# Patient Record
Sex: MALE | Race: WHITE | NOT HISPANIC OR LATINO | Employment: FULL TIME | URBAN - METROPOLITAN AREA
[De-identification: names, ages, dates, MRNs, and addresses within clinical notes are randomized per-mention and may not be internally consistent; named-entity substitution may affect disease eponyms.]

---

## 2018-01-09 ENCOUNTER — OFFICE VISIT CONVERTED (OUTPATIENT)
Dept: PULMONOLOGY | Facility: CLINIC | Age: 53
End: 2018-01-09
Attending: INTERNAL MEDICINE

## 2018-02-09 ENCOUNTER — OFFICE VISIT CONVERTED (OUTPATIENT)
Dept: PULMONOLOGY | Facility: CLINIC | Age: 53
End: 2018-02-09
Attending: INTERNAL MEDICINE

## 2018-02-28 ENCOUNTER — OFFICE VISIT CONVERTED (OUTPATIENT)
Dept: PULMONOLOGY | Facility: CLINIC | Age: 53
End: 2018-02-28
Attending: INTERNAL MEDICINE

## 2018-09-20 ENCOUNTER — OFFICE VISIT CONVERTED (OUTPATIENT)
Dept: PULMONOLOGY | Facility: CLINIC | Age: 53
End: 2018-09-20
Attending: INTERNAL MEDICINE

## 2018-10-19 ENCOUNTER — OFFICE VISIT CONVERTED (OUTPATIENT)
Dept: PULMONOLOGY | Facility: CLINIC | Age: 53
End: 2018-10-19
Attending: INTERNAL MEDICINE

## 2018-12-07 ENCOUNTER — OFFICE VISIT CONVERTED (OUTPATIENT)
Dept: PULMONOLOGY | Facility: CLINIC | Age: 53
End: 2018-12-07
Attending: INTERNAL MEDICINE

## 2019-06-10 ENCOUNTER — HOSPITAL ENCOUNTER (OUTPATIENT)
Dept: GENERAL RADIOLOGY | Facility: HOSPITAL | Age: 54
Discharge: HOME | End: 2019-06-10
Attending: INTERNAL MEDICINE

## 2019-06-26 ENCOUNTER — OFFICE VISIT CONVERTED (OUTPATIENT)
Dept: PULMONOLOGY | Facility: CLINIC | Age: 54
End: 2019-06-26
Attending: INTERNAL MEDICINE

## 2019-09-23 ENCOUNTER — OUTSIDE FACILITY SERVICE (OUTPATIENT)
Dept: SLEEP MEDICINE | Facility: HOSPITAL | Age: 54
End: 2019-09-23

## 2019-09-23 ENCOUNTER — HOSPITAL ENCOUNTER (OUTPATIENT)
Dept: SLEEP MEDICINE | Facility: HOSPITAL | Age: 54
Discharge: HOME | End: 2019-09-23
Attending: INTERNAL MEDICINE

## 2019-09-25 ENCOUNTER — OUTSIDE FACILITY SERVICE (OUTPATIENT)
Dept: SLEEP MEDICINE | Facility: HOSPITAL | Age: 54
End: 2019-09-25

## 2019-09-25 PROCEDURE — 95811 POLYSOM 6/>YRS CPAP 4/> PARM: CPT | Performed by: INTERNAL MEDICINE

## 2019-09-30 ENCOUNTER — HOSPITAL ENCOUNTER (OUTPATIENT)
Dept: CARDIOLOGY | Facility: HOSPITAL | Age: 54
Discharge: HOME | End: 2019-09-30
Attending: INTERNAL MEDICINE

## 2019-09-30 LAB
CREAT BLD-MCNC: 0.8 MG/DL (ref 0.6–1.4)
GFR SERPLBLD BASED ON 1.73 SQ M-ARVRAT: >60 ML/MIN/{1.73_M2}

## 2019-10-17 ENCOUNTER — HOSPITAL ENCOUNTER (OUTPATIENT)
Dept: LAB | Facility: HOSPITAL | Age: 54
Discharge: HOME | End: 2019-10-17
Attending: INTERNAL MEDICINE

## 2019-10-17 ENCOUNTER — OFFICE VISIT CONVERTED (OUTPATIENT)
Dept: PULMONOLOGY | Facility: CLINIC | Age: 54
End: 2019-10-17
Attending: INTERNAL MEDICINE

## 2019-10-17 LAB
ALBUMIN SERPL-MCNC: 4.1 G/DL (ref 3.5–5)
ALBUMIN/GLOB SERPL: 1.1 {RATIO} (ref 1.4–2.6)
ALP SERPL-CCNC: 157 U/L (ref 56–119)
ALT SERPL-CCNC: 24 U/L (ref 10–40)
ANION GAP SERPL CALC-SCNC: 18 MMOL/L (ref 8–19)
AST SERPL-CCNC: 26 U/L (ref 15–50)
BILIRUB SERPL-MCNC: 0.57 MG/DL (ref 0.2–1.3)
BNP SERPL-MCNC: 1116 PG/ML (ref 0–900)
BUN SERPL-MCNC: 16 MG/DL (ref 5–25)
BUN/CREAT SERPL: 19 {RATIO} (ref 6–20)
CALCIUM SERPL-MCNC: 9.8 MG/DL (ref 8.7–10.4)
CHLORIDE SERPL-SCNC: 102 MMOL/L (ref 99–111)
CONV CO2: 24 MMOL/L (ref 22–32)
CONV TOTAL PROTEIN: 7.9 G/DL (ref 6.3–8.2)
CREAT UR-MCNC: 0.85 MG/DL (ref 0.7–1.2)
GFR SERPLBLD BASED ON 1.73 SQ M-ARVRAT: >60 ML/MIN/{1.73_M2}
GLOBULIN UR ELPH-MCNC: 3.8 G/DL (ref 2–3.5)
GLUCOSE SERPL-MCNC: 95 MG/DL (ref 70–99)
OSMOLALITY SERPL CALC.SUM OF ELEC: 289 MOSM/KG (ref 273–304)
POTASSIUM SERPL-SCNC: 4.6 MMOL/L (ref 3.5–5.3)
SODIUM SERPL-SCNC: 139 MMOL/L (ref 135–147)

## 2019-12-06 ENCOUNTER — OFFICE VISIT CONVERTED (OUTPATIENT)
Dept: PULMONOLOGY | Facility: CLINIC | Age: 54
End: 2019-12-06
Attending: PHYSICIAN ASSISTANT

## 2019-12-18 ENCOUNTER — HOSPITAL ENCOUNTER (OUTPATIENT)
Dept: GENERAL RADIOLOGY | Facility: HOSPITAL | Age: 54
Discharge: HOME | End: 2019-12-18
Attending: INTERNAL MEDICINE

## 2019-12-31 ENCOUNTER — OFFICE VISIT CONVERTED (OUTPATIENT)
Dept: PULMONOLOGY | Facility: CLINIC | Age: 54
End: 2019-12-31
Attending: INTERNAL MEDICINE

## 2020-01-21 ENCOUNTER — HOSPITAL ENCOUNTER (OUTPATIENT)
Dept: NUCLEAR MEDICINE | Facility: HOSPITAL | Age: 55
Discharge: HOME | End: 2020-01-21
Attending: INTERNAL MEDICINE

## 2020-02-03 ENCOUNTER — OFFICE VISIT CONVERTED (OUTPATIENT)
Dept: PULMONOLOGY | Facility: CLINIC | Age: 55
End: 2020-02-03
Attending: INTERNAL MEDICINE

## 2020-03-04 ENCOUNTER — HOSPITAL ENCOUNTER (OUTPATIENT)
Dept: CARDIOLOGY | Facility: HOSPITAL | Age: 55
Discharge: HOME | End: 2020-03-04
Attending: INTERNAL MEDICINE

## 2020-05-20 ENCOUNTER — OFFICE VISIT CONVERTED (OUTPATIENT)
Dept: PULMONOLOGY | Facility: CLINIC | Age: 55
End: 2020-05-20
Attending: INTERNAL MEDICINE

## 2020-05-26 ENCOUNTER — HOSPITAL ENCOUNTER (OUTPATIENT)
Dept: LAB | Facility: HOSPITAL | Age: 55
Discharge: HOME | End: 2020-05-26
Attending: INTERNAL MEDICINE

## 2020-05-26 ENCOUNTER — OFFICE VISIT CONVERTED (OUTPATIENT)
Dept: PULMONOLOGY | Facility: CLINIC | Age: 55
End: 2020-05-26
Attending: INTERNAL MEDICINE

## 2020-05-26 LAB
ALBUMIN SERPL-MCNC: 4.2 G/DL (ref 3.5–5)
ALBUMIN/GLOB SERPL: 1.1 {RATIO} (ref 1.4–2.6)
ALP SERPL-CCNC: 200 U/L (ref 56–119)
ALT SERPL-CCNC: 15 U/L (ref 10–40)
ANION GAP SERPL CALC-SCNC: 18 MMOL/L (ref 8–19)
AST SERPL-CCNC: 23 U/L (ref 15–50)
BASOPHILS # BLD AUTO: 0.06 10*3/UL (ref 0–0.2)
BASOPHILS NFR BLD AUTO: 0.6 % (ref 0–3)
BILIRUB SERPL-MCNC: 0.91 MG/DL (ref 0.2–1.3)
BNP SERPL-MCNC: 1485 PG/ML (ref 0–900)
BUN SERPL-MCNC: 21 MG/DL (ref 5–25)
BUN/CREAT SERPL: 21 {RATIO} (ref 6–20)
CALCIUM SERPL-MCNC: 9.4 MG/DL (ref 8.7–10.4)
CHLORIDE SERPL-SCNC: 95 MMOL/L (ref 99–111)
CONV ABS IMM GRAN: 0.05 10*3/UL (ref 0–0.2)
CONV CO2: 25 MMOL/L (ref 22–32)
CONV IMMATURE GRAN: 0.5 % (ref 0–1.8)
CONV TOTAL PROTEIN: 8.2 G/DL (ref 6.3–8.2)
CREAT UR-MCNC: 0.98 MG/DL (ref 0.7–1.2)
DEPRECATED RDW RBC AUTO: 43.7 FL (ref 35.1–43.9)
EOSINOPHIL # BLD AUTO: 0.11 10*3/UL (ref 0–0.7)
EOSINOPHIL # BLD AUTO: 1.1 % (ref 0–7)
ERYTHROCYTE [DISTWIDTH] IN BLOOD BY AUTOMATED COUNT: 12.3 % (ref 11.6–14.4)
GFR SERPLBLD BASED ON 1.73 SQ M-ARVRAT: >60 ML/MIN/{1.73_M2}
GLOBULIN UR ELPH-MCNC: 4 G/DL (ref 2–3.5)
GLUCOSE SERPL-MCNC: 89 MG/DL (ref 70–99)
HCT VFR BLD AUTO: 47.6 % (ref 42–52)
HGB BLD-MCNC: 15.5 G/DL (ref 14–18)
LYMPHOCYTES # BLD AUTO: 2.65 10*3/UL (ref 1–5)
LYMPHOCYTES NFR BLD AUTO: 26.1 % (ref 20–45)
MCH RBC QN AUTO: 31.6 PG (ref 27–31)
MCHC RBC AUTO-ENTMCNC: 32.6 G/DL (ref 33–37)
MCV RBC AUTO: 96.9 FL (ref 80–96)
MONOCYTES # BLD AUTO: 0.63 10*3/UL (ref 0.2–1.2)
MONOCYTES NFR BLD AUTO: 6.2 % (ref 3–10)
NEUTROPHILS # BLD AUTO: 6.64 10*3/UL (ref 2–8)
NEUTROPHILS NFR BLD AUTO: 65.5 % (ref 30–85)
NRBC CBCN: 0 % (ref 0–0.7)
OSMOLALITY SERPL CALC.SUM OF ELEC: 278 MOSM/KG (ref 273–304)
PLATELET # BLD AUTO: 274 10*3/UL (ref 130–400)
PMV BLD AUTO: 13 FL (ref 9.4–12.4)
POTASSIUM SERPL-SCNC: 4.5 MMOL/L (ref 3.5–5.3)
RBC # BLD AUTO: 4.91 10*6/UL (ref 4.7–6.1)
SODIUM SERPL-SCNC: 133 MMOL/L (ref 135–147)
WBC # BLD AUTO: 10.14 10*3/UL (ref 4.8–10.8)

## 2020-07-10 ENCOUNTER — OFFICE VISIT CONVERTED (OUTPATIENT)
Dept: CARDIOLOGY | Facility: CLINIC | Age: 55
End: 2020-07-10
Attending: INTERNAL MEDICINE

## 2020-09-11 ENCOUNTER — OFFICE VISIT CONVERTED (OUTPATIENT)
Dept: PULMONOLOGY | Facility: CLINIC | Age: 55
End: 2020-09-11
Attending: INTERNAL MEDICINE

## 2020-09-11 ENCOUNTER — OFFICE VISIT CONVERTED (OUTPATIENT)
Dept: CARDIOLOGY | Facility: CLINIC | Age: 55
End: 2020-09-11
Attending: INTERNAL MEDICINE

## 2020-09-11 ENCOUNTER — CONVERSION ENCOUNTER (OUTPATIENT)
Dept: CARDIOLOGY | Facility: CLINIC | Age: 55
End: 2020-09-11

## 2021-05-10 NOTE — H&P
History and Physical      Patient Name: Braxton Mcghee Jr.   Patient ID: [id number]   Sex: Male   YOB: 1965    Primary Care Provider: Jose Adorno MD   Referring Provider: Louis Geronimo MD    Visit Date: July 10, 2020    Provider: Jayce Plummer MD   Location: [facility]   Location Address: [address] [place] [address], [facility]   [place], [place]  [id number]   Location Phone: [phone]          Chief Complaint  · Shortness of breath   · Swelling in the legs      History Of Present Illness  Consult requested by: Louis Geronimo MD   Braxton Mcghee Jr. is a pleasant, 55 year old, white male who was referred here by Dr. Geronimo due to worsening shortness of breath as well as worsening bilateral lower-extremity edema over the past month He has a history of severe restrictive lung disease per his last PFTs in January 2020 felt to be second to pneumonitis or interstitial lung disease. The patient had a previous extensive cardiac workup at [facility] in [place], including a left and right heart catheterization in December 2019. It showed mild, non-obstructive coronary artery disease, mildly reduced left ventricular systolic function with an estimated ejection fraction of 45% and severe pulmonary arterial hypertension with a mean PA pressure of 54 mmHg and severely elevated pulmonary vascular resistance. An echocardiogram obtained last fall likewise showed an ejection fraction of 45-50% with a moderate to severely dilated right ventricle and severely reduced right ventricular systolic function. Severe tricuspid regurgitation was also observed. Mr. Mcghee does not report any episodes of chest pain/pressure but states he has limiting dyspnea with even mild physical exertion, such as walking less than 50 yards. He states his bilateral leg swelling has increased significantly in the past couple weeks, and he has gained approximately 10 pounds. He now has two-pillow orthopnea. No  PND, syncope or lightheadedness reported. His EKG obtained in the office today shows atrial flutter, primarily with 2:1 ventricular response and a heart rate of 122 beats per minute. This is a new diagnosis for him, and his previous EKGs from last fall all showed sinus rhythm. He does not wish to be admitted to the hospital at this time but is willing to return on Monday for a repeat EKG after initiation of some rate-control therapy.   PAST MEDICAL HISTORY: Severe restrictive lung disease, felt to be secondary to pneumonitis or interstitial lung disease; severe pulmonary arterial hypertension; cor pulmonale with a severely dilated right ventricle and severely reduced right ventricular systolic function per the patient's last echocardiogram at [facility] mild left ventricular dysfunction with an estimated ejection fraction of 45%; obstructive sleep apnea, on chronic CPAP therapy; essential hypertension; mild non-obstructive coronary artery disease; history of DVT and pulmonary embolism, not currently on chronic anti-coagulation. PAST SURGICAL HISTORY: No previous surgeries reported.   PSYCHOSOCIAL HISTORY: Mr. Mcghee was a former long-term smoker but quit smoking in 2015. He previously smoked about 1 pack of cigarettes daily for greater than 20 years. He reports rare alcohol use, but no history of alcohol abuse or illicit drug use. He is  and works as a farmer.   FAMILY HISTORY: Positive for hypertension in both of his parents; premature coronary artery disease in his father.   CURRENT MEDICATIONS: include Metolazone 5 mg daily; Zolpidem 5 mg daily; Lisinopril 20 mg daily; aspirin 81 mg daily; Lasix 40 mg b.i.d.; Omeprazole 20 mg daily; Prednisone 25 mg daily; Plaquenil 200 mg daily. The dosage and frequency of the medications were reviewed with the patient.   ALLERGIES: No known drug allergies.       Review of Systems  · Constitutional  o Admits  o : fatigue, recent weight changes   o Denies  o : good  "general health lately  · Eyes  o Denies  o : double vision, blurred vision  · HENT  o Admits  o : chronic sinus problem  o Denies  o : hearing loss or ringing, swollen glands in neck  · Cardiovascular  o Admits  o : swelling (feet, ankles, hands), shortness of breath while walking or lying flat  o Denies  o : chest pain, palpitations (fast, fluttering, or skipping beats)  · Respiratory  o Admits  o : chronic or frequent cough, asthma or wheezing  o Denies  o : COPD  · Gastrointestinal  o Denies  o : ulcers, nausea or vomiting  · Neurologic  o Admits  o : lightheaded or dizzy, headaches  o Denies  o : stroke  · Musculoskeletal  o Admits  o : joint pain, back pain  · Endocrine  o Admits  o : heat or cold intolerance, excessive thirst or urination  o Denies  o : thyroid disease, diabetes  · Heme-Lymph  o Admits  o : bleeding or bruising tendency, anemia      Vitals  Date Time BP Position Site L\R Cuff Size HR RR TEMP (F) WT  HT  BMI kg/m2 BSA m2 O2 Sat        07/10/2020 10:11 /84 Sitting    106 - R   237lbs 0oz 5'  8\" 36.04 2.27           Physical Examination  · Constitutional  o Appearance  o : Awake, alert, in no acute distress.  · Head and Face  o HEENT  o : No pallor, anicteric. Eyes normal. Moist mucous membranes.  · Neck  o Inspection/Palpation  o : No hepatosplenomegaly.  o Jugular Veins  o : Supple. Mild JVD present. No carotid bruits. No masses or thyromegaly.  · Respiratory  o Auscultation of Lungs  o : Scattered rhonchi bilaterally. Decreased air movement of the lower lung fields. Prolonged expiratory phase. No tachypnea. Normal effort with no accessory muscle use.  · Cardiovascular  o Heart  o : Tachycardic, regular rhythm. S1 and S2 present. No S3 gallop. There is a 2/6 systolic murmur at the apex with a mild diastolic murmur at the right upper-sternal border. No friction rub. No heave.  · Gastrointestinal  o Abdominal Examination  o : Obese, soft, non-distended. No palpable hepatosplenomegaly. " Bowel sounds heard in all four quadrants.  · Musculoskeletal  o General  o : Normal muscle tone and strength.  · Skin and Subcutaneous Tissue  o General Inspection  o : No skin rashes.  · Extremities  o Extremities  o : No cyanosis or clubbing. 3+ pitting bilateral lower-extremity edema was observed to the knee. 2+ radial pulses bilaterally.     EKG was performed in the office today.  Indication:       shortness of breath.  Results:           atrial flutter primarily with 2:1 ventricular response and heart rate of 122 beats per minute.                          The EKG also showed right ventricular hypertrophy and an old inferior infarct as well as diffuse                        non-specific T wave changes likely secondary to atrial flutter.  Comparison:   Comparison was made to previous EKG from October 1, 2019, which showed sinus rhythm with                       an old inferior infarct and diffuse non-specific T wave changes.    Labs -  CBC:  White blood cells 10.1, hemoglobin 15.5, hematocrit 47.6, platelets 274.  Chemistry - Sodium 133, potassium 4.5, BUN 21, creatinine 0.98, glucose 89.  LFTs were within normal limits, except for a mildly elevated alkaline phosphatase level of 200.             Assessment     1.  New-onset atrial flutter primarily with 2:1 ventricular response.  2.  Cor pulmonale with severely reduced right ventricular systolic function per an echocardiogram from fall 2019.  3.  Severe pulmonary arterial hypertension.  4.  Severe restrictive lung disease.  5.  Obstructive sleep apnea, on chronic CPAP therapy.  6.  Mild, non-obstructive coronary artery disease.  7.  Mild left ventricular systolic dysfunction with an estimated ejection fraction of 45%.  8.  Obesity with a BMI of 36.0 today.  9.  Essential hypertension.       Plan     He declines hospitalization at this time for acute treatment of his atrial flutter.  Accordingly, I will start rate control with Metoprolol 50 mg b.i.d. and  plan to have him return for repeat EKG on Monday.  I told him if he remains in atrial flutter, we will likely admit him to the hospital on Monday for ANKUR-guided cardioversion.  Will start chronic anti-coagulation with Eliquis 5 mg b.i.d. now due to his elevated CHADS2-VASc score.  I informed him that the treatment options for right-sided heart failure are fairly limited and consist primarily of diuresis. Will increase his Lasix dose to 80 mg b.i.d. for now and continue Metolazone at the current dose.  I told him he may need to be admitted for IV diuretic therapy early next week if he is not diuresing adequately.  Continue his other home medications for now.    Jayce Plummer MD  BP/dmd           This note was transcribed by Mariluz Orellana.  dmd/BP  The above service was transcribed by Mariluz Orellana, and I attest to the accuracy of the note.  BP               Electronically Signed by: Jayce Plummer MD -Author on July 15, 2020 01:48:13 PM

## 2021-05-13 NOTE — PROGRESS NOTES
Progress Note      Patient Name: Braxton Mcghee Jr.   Patient ID: [id number]   Sex: Male   YOB: 1965    Primary Care Provider: Jose Adorno MD   Referring Provider: Louis Geronimo MD    Visit Date: September 11, 2020    Provider: Jayce Plummer MD   Location: [facility]   Location Address: [address] [place] [address], [facility]   [place]  [id number]   Location Phone: [phone]          Chief Complaint  · Here for hospital followup       History Of Present Illness  REFERRING CARE PROVIDER: Louis Geronimo MD   Braxton Mcghee Jr. is a pleasant 55-year-old male who presents for routine followup after undergoing a successful ANKUR-guided cardioversion in early July for newly diagnosed paroxysmal atrial flutter. He states he has felt much better since his cardioversion, and he can now perform all his normal daily activities without any limiting dyspnea. Likewise, he states that his chronic lower extremity edema has significantly improved following his cardioversion, and his weight has dropped over 20 pounds. He does not report any recurrent palpitations or any PND, lightheadedness, syncope, or chest pain/pressure. Mr. Mcghee has a history of chronic right-sided CHF with moderate to severely reduced right ventricular systolic function, as well as severe pulmonary arterial hypertension, severe restrictive lung disease (felt to be secondary to pneumonitis versus interstitial lung disease), obstructive sleep apnea, and cor pulmonale. In addition, he has a history of moderate left ventricular systolic dysfunction with an estimated ejection fraction of 35-40% per his ANKUR earlier this summer. He has been on chronic anticoagulation with Eliquis since the time of his cardioversion and is tolerating this medication well with no bleeding issues reported. His blood pressure remains well controlled today. He states he is diuresing well on his chronic doses of Lasix, and his pulmonologist plans to taper him off  "of chronic steroid therapy with low-dose Prednisone later this month. His EKG obtained in the office today showed sinus rhythm with occasional PACs, bi-atrial enlargement, and RSR' pattern in leads V1/V2 consistent with right ventricular conduction delay, as well as nonspecific ST-T changes.   PAST MEDICAL HISTORY: Severe restrictive lung disease, felt to be secondary to pneumonitis or interstitial lung disease; severe pulmonary arterial hypertension; cor pulmonale with a severely dilated right ventricle and severely reduced right ventricular systolic function per the patient's last echocardiogram at [facility]; mild left ventricular dysfunction with an estimated ejection fraction of 45%; obstructive sleep apnea, on chronic CPAP therapy; essential hypertension; mild nonobstructive coronary artery disease; history of DVT and pulmonary embolism, not currently on chronic anticoagulation.   PSYCHOSOCIAL HISTORY: No history of mood change or depression. He drinks a moderate amount of alcohol. He previously smoked but quit.   CURRENT MEDICATIONS: include Furosemide 80 mg and 40 mg daily; Metolazone 5 mg b.i.d.; Eliquis 5 mg b.i.d.; Lisinopril 20 mg daily; Plaquenil 200 mg daily; Prednisone 2.5 mg daily. The dosage and frequency of the medications were reviewed with the patient.       Review of Systems  · Cardiovascular  o Admits  o : swelling (feet, ankles, hands), shortness of breath while walking or lying flat  o Denies  o : palpitations (fast, fluttering, or skipping beats), chest pain or angina pectoris   · Respiratory  o Denies  o : chronic or frequent cough, asthma or wheezing      Vitals  Date Time BP Position Site L\R Cuff Size HR RR TEMP (F) WT  HT  BMI kg/m2 BSA m2 O2 Sat        09/11/2020 12:03 /78 Sitting    106 - R   223lbs 16oz 5'  8\" 34.06 2.21           Physical Examination  · Respiratory  o Auscultation of Lungs  o : No wheezing, rales, or rhonchi. Clear to auscultation bilaterally. Prolonged " expiratory phase. Normal effort. No tachypnea. No dullness to percussion.   · Cardiovascular  o Heart  o : Regular rate and rhythm. S1, S2 present. 2/6 systolic murmur at the right lower sternal border. No friction rub. No heave. PMI is laterally displaced.   · Gastrointestinal  o Abdominal Examination  o : Soft, obese, nondistended, nontender. Normal bowel sounds throughout all quadrants. No masses. No hepatosplenomegaly.   · Extremities  o Extremities  o : No cyanosis. Trace bilateral lower extremity edema. 2+ radial pulses bilaterally. Skin warm and dry. No rashes or lesions. Minimal chronic venous stasis skin changes over the distal lower extremities. Normal skin turgor.   · EKG  o EKG  o : Obtained in the office today.   o Indications  o : Paroxysmal atrial flutter.   o Results  o : Sinus rhythm with a heart rate of 98 beats per minute and occasional PACs, bi-atrial enlargement, RSR' pattern in leads V1/V2 consistent with right ventricular conduction delay, and nonspecific ST-T changes.   o Comparison  o : Made to previous EKG of July 10, 2020, which showed atrial flutter with variable conduction and rapid ventricular response.           Assessment     1.  Paroxysmal atrial flutter, status post successful ANKUR-guided cardioversion in July 2020.  Mr. Mcghee remains in        sinus rhythm today with no signs/symptoms suggestive of recurrent atrial flutter.    2.  Chronic right-sided congestive heart failure with moderate to severely reduced right ventricular systolic        function per his last echocardiogram.   3.  Cor pulmonale.   4.  Severe pulmonary arterial hypertension.   5.  Severe restrictive lung disease, felt to be secondary to pneumonitis versus interstitial lung disease.   6.  Obstructive sleep apnea, on chronic CPAP therapy.   7.  Nonischemic cardiomyopathy with an ejection fraction of 35-40% per latest evaluation.  8.  History of DVT/PE.  9.  Essential hypertension.       Plan     Mr. Mcghee appears  essentially euvolemic today and is doing well on his current diuretic regimen.  Continue chronic anticoagulation with Eliquis.  His EKG today shows sinus rhythm, and he has no signs/symptoms suggestive of recurrent atrial flutter.  Continue Lisinopril for moderate LV dysfunction and hypertension.  He does not wish to have a trial of Entresto instead of Lisinopril at this time.  He previously failed to tolerate beta-blockers and does not wish to have a trial of any beta-blockers for now  If he is continuing to feel well and remains essentially asymptomatic, I will plan to see him back in the office in six months for reassessment.       MD KIM Inman/pap      This note was transcribed by Jill Prince.  pap/kim  The above service was transcribed by Jill Prince, and I attest to the accuracy of the note.  BAP             Electronically Signed by: Marylou Prince-, Other -Author on September 16, 2020 09:24:46 AM  Electronically Co-signed by: Jayce Plummer MD -Reviewer on September 16, 2020 03:24:59 PM

## 2021-05-14 VITALS
HEART RATE: 106 BPM | HEIGHT: 68 IN | BODY MASS INDEX: 33.95 KG/M2 | DIASTOLIC BLOOD PRESSURE: 78 MMHG | WEIGHT: 224 LBS | SYSTOLIC BLOOD PRESSURE: 134 MMHG

## 2021-05-15 VITALS
SYSTOLIC BLOOD PRESSURE: 114 MMHG | BODY MASS INDEX: 35.92 KG/M2 | DIASTOLIC BLOOD PRESSURE: 84 MMHG | HEIGHT: 68 IN | HEART RATE: 106 BPM | WEIGHT: 237 LBS

## 2021-05-25 ENCOUNTER — OFFICE VISIT CONVERTED (OUTPATIENT)
Dept: CARDIOLOGY | Facility: CLINIC | Age: 56
End: 2021-05-25
Attending: INTERNAL MEDICINE

## 2021-05-28 VITALS
BODY MASS INDEX: 36.23 KG/M2 | HEART RATE: 73 BPM | OXYGEN SATURATION: 93 % | OXYGEN SATURATION: 95 % | BODY MASS INDEX: 38.53 KG/M2 | TEMPERATURE: 98.4 F | BODY MASS INDEX: 38.26 KG/M2 | WEIGHT: 251.56 LBS | HEART RATE: 76 BPM | HEART RATE: 95 BPM | TEMPERATURE: 98.2 F | TEMPERATURE: 98 F | BODY MASS INDEX: 38.13 KG/M2 | SYSTOLIC BLOOD PRESSURE: 142 MMHG | WEIGHT: 254.25 LBS | TEMPERATURE: 98.2 F | WEIGHT: 252 LBS | SYSTOLIC BLOOD PRESSURE: 132 MMHG | OXYGEN SATURATION: 90 % | BODY MASS INDEX: 38.19 KG/M2 | HEART RATE: 76 BPM | WEIGHT: 239.06 LBS | TEMPERATURE: 98.1 F | HEIGHT: 68 IN | RESPIRATION RATE: 16 BRPM | HEART RATE: 85 BPM | DIASTOLIC BLOOD PRESSURE: 76 MMHG | HEIGHT: 68 IN | HEIGHT: 68 IN | TEMPERATURE: 98.3 F | HEIGHT: 68 IN | RESPIRATION RATE: 18 BRPM | WEIGHT: 254.31 LBS | OXYGEN SATURATION: 92 % | OXYGEN SATURATION: 94 % | RESPIRATION RATE: 14 BRPM | SYSTOLIC BLOOD PRESSURE: 150 MMHG | DIASTOLIC BLOOD PRESSURE: 90 MMHG | OXYGEN SATURATION: 93 % | TEMPERATURE: 98.1 F | HEART RATE: 87 BPM | RESPIRATION RATE: 16 BRPM | DIASTOLIC BLOOD PRESSURE: 84 MMHG | HEIGHT: 68 IN | TEMPERATURE: 98.1 F | HEIGHT: 68 IN | HEIGHT: 68 IN | TEMPERATURE: 98.5 F | WEIGHT: 254 LBS | RESPIRATION RATE: 16 BRPM | HEART RATE: 99 BPM | BODY MASS INDEX: 38.49 KG/M2 | OXYGEN SATURATION: 90 % | HEART RATE: 95 BPM | DIASTOLIC BLOOD PRESSURE: 84 MMHG | HEART RATE: 83 BPM | SYSTOLIC BLOOD PRESSURE: 132 MMHG | SYSTOLIC BLOOD PRESSURE: 152 MMHG | SYSTOLIC BLOOD PRESSURE: 140 MMHG | DIASTOLIC BLOOD PRESSURE: 76 MMHG | BODY MASS INDEX: 38.25 KG/M2 | WEIGHT: 252.44 LBS | SYSTOLIC BLOOD PRESSURE: 134 MMHG | SYSTOLIC BLOOD PRESSURE: 134 MMHG | BODY MASS INDEX: 38.96 KG/M2 | HEART RATE: 75 BPM | SYSTOLIC BLOOD PRESSURE: 157 MMHG | OXYGEN SATURATION: 93 % | RESPIRATION RATE: 16 BRPM | RESPIRATION RATE: 16 BRPM | BODY MASS INDEX: 39.58 KG/M2 | OXYGEN SATURATION: 95 % | DIASTOLIC BLOOD PRESSURE: 96 MMHG | WEIGHT: 252.37 LBS | BODY MASS INDEX: 38.54 KG/M2 | WEIGHT: 261.19 LBS | HEIGHT: 68 IN | WEIGHT: 262.37 LBS | HEIGHT: 68 IN | DIASTOLIC BLOOD PRESSURE: 98 MMHG | BODY MASS INDEX: 39.76 KG/M2 | RESPIRATION RATE: 16 BRPM | HEIGHT: 68 IN | DIASTOLIC BLOOD PRESSURE: 76 MMHG | TEMPERATURE: 97.7 F | DIASTOLIC BLOOD PRESSURE: 84 MMHG | TEMPERATURE: 98.2 F | DIASTOLIC BLOOD PRESSURE: 91 MMHG | OXYGEN SATURATION: 92 % | OXYGEN SATURATION: 92 % | RESPIRATION RATE: 16 BRPM | WEIGHT: 257.06 LBS | RESPIRATION RATE: 16 BRPM | RESPIRATION RATE: 16 BRPM | DIASTOLIC BLOOD PRESSURE: 92 MMHG | SYSTOLIC BLOOD PRESSURE: 134 MMHG | SYSTOLIC BLOOD PRESSURE: 144 MMHG | HEIGHT: 68 IN | HEART RATE: 88 BPM

## 2021-05-28 VITALS
HEIGHT: 68 IN | RESPIRATION RATE: 16 BRPM | OXYGEN SATURATION: 94 % | HEART RATE: 95 BPM | WEIGHT: 257 LBS | BODY MASS INDEX: 38.95 KG/M2 | DIASTOLIC BLOOD PRESSURE: 81 MMHG | SYSTOLIC BLOOD PRESSURE: 143 MMHG | TEMPERATURE: 98 F

## 2021-05-28 VITALS
HEIGHT: 68 IN | WEIGHT: 217 LBS | SYSTOLIC BLOOD PRESSURE: 130 MMHG | OXYGEN SATURATION: 96 % | HEART RATE: 93 BPM | DIASTOLIC BLOOD PRESSURE: 80 MMHG | BODY MASS INDEX: 32.89 KG/M2 | TEMPERATURE: 98.2 F | RESPIRATION RATE: 16 BRPM

## 2021-05-28 NOTE — PROGRESS NOTES
Patient: FREDERICK KNOWLES JR     Acct: [id number]     Report: #[id number]  UNIT #: [id number]     : 1965    Encounter Date:2020  PRIMARY CARE: NAVEED ELIZABETH  ***Signed***  --------------------------------------------------------------------------------------------------------------------  Chief Complaint      Encounter Date      May 26, 2020            Primary Care Provider            Naveed CARRASCO            Referring Provider      Tenisha Clayton            Patient Complaint      Patient is complaining of      1 week follow up/soa            VITALS      Height 5 ft 8 in / 172.72 cm      Weight 239 lbs 1 oz / 108.705271 kg      BSA 2.20 m2      BMI 36.3 kg/m2      Temperature 98.5 F / 36.94 C - Oral      Pulse 87      Respirations 16      Blood Pressure 134/90 Sitting, Right Arm      Pulse Oximetry 95%, room air      Initial Exhaled Nitrous Oxide      Exhaled Nitrous Oxide Results:  11            HPI      The patient is a 55 year old male with obesity, severe sleep apnea, severe     obstructive and restrictive airway disease and a history of systemic lupus. I     have been following him for lung nodule and interstitial lung disease. Over the     last 2 years, lung nodule and interstitial lung disease has been stable.        He had acute symptoms suggestive of congestive heart failure with an EF down to    45% back in 10/2019.  Since then his echocardiogram showed severe pulmonary     hypertension.  He had right and left heart catheterization in 2019 with Dr. Mendoza in [facility] in [place].  He had TeleHealth visit last     week with concern for syncopal attacks. He had repeat echocardiogram three     months ago which showed severe pulmonary hypertension.  The patient had gained     around 10-15 pounds and was having cough and shortness of breath and every time     when he had coughing fits, he would pass out, but would not remember the event,     he never had  any chest pain or chest tightness.  He sometimes feels fluttering     of heart, but overall has no significant problems with palpitation during those     episodes. The syncopal episodes happened once in early april and one 2 weeks     ago, second week of may. He was having leg swelling and worsening shortness of     breath, so had gone up on Lasix to 40 mg three times a day. He has lost about 9-    10 pounds and feels some better. He continues to use BiPAP 21/13. I reviewed the    BiPAP settings today. 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  He has shortness of breath especially    going up and down the stairs.  His leg swelling has significantly improved,     being on a higher dose of Lasix and added metolazone.  He has no chest pain or     chest tightness. He does not have fever or chills.  He feels like his breathing     is slightly better now.  Cough is improved as well on higher dose of diuretics.            ROS      Constitutional:  Complains of: Fatigue; Denies: Fever, Weight gain, Weight loss,    Chills, Insomnia, Other      Respiratory/Breathing:  Complains of: Shortness of air, Wheezing, Cough; Denies:    Hemoptysis, Pleuritic pain, Other      Endocrine:  Denies: Polydipsia, Polyuria, Heat/cold intolerance, Diabetes, Other      Eyes:  Denies: Blurred vision, Vision Changes, Other      Ears, nose, mouth, throat:  Denies: Mouth lesions, Thrush, Throat pain,     Hoarseness, Allergies/Hay Fever, Post Nasal Drip, Headaches, Recent Head Injury,    Nose Bleeding, Neck Stiffness, Thyroid Mass, Hearing Loss, Ear Fullness, Dry     Mouth, Nasal or Sinus Pain, Dry Lips, Nasal discharge, Nasal congestion, Other      Cardiovascular:  Denies: Palpitations, Syncope, Claudication, Chest Pain, Wake     up Gasping for air, Leg Swelling, Irregular Heart Rate, Cyanosis, Dyspnea on     Exertion, Other      Gastrointestinal:  Denies: Nausea, Constipation, Diarrhea,  "Abdominal pain,     Vomiting, Difficulty Swallowing, Reflux/Heartburn, Dysphagia, Jaundice,     Bloating, Melena, Bloody stools, Other      Genitourinary:  Denies: Urinary frequency, Incontinence, Hematuria, Urgency,     Nocturia, Dysuria, Testicular problems, Other      Musculoskeletal:  Denies: Joint Pain, Joint Stiffness, Joint Swelling, Myalgias,    Other      Hematologic/lymphatic:  DENIES: Lymphadenopathy, Bruising, Bleeding tendencies,     Other      Psychiatric:  Denies: Anxiety, Appropriate Effect, Depression, Other      Sleep:  No: Excessive daytime sleep, Morning Headache?, Snoring, Insomnia?, Stop    breathing at sleep?, Other      Integumentary:  Denies: Rash, Dry skin, Skin Warm to Touch, Other      Immunologic/Allergic:  Denies: Latex allergy, Seasonal allergies, Asthma,     Urticaria, Eczema, Other      Immunization status:  No: Up to date            FAMILY/SOCIAL/MEDICAL HX      Surgical History:  Yes: Appendectomy (AGE 12YR), Bowel Surgery (COLONOSCOPY),     Kidney Surgery (Kidney stones), Oral Surgery (TEETH REMOVED); No: AAA Repair,     Abdominal Surgery, Angioplasty, Back Surgery, Bladder Surgery, Breast Surgery,     CABG, Carotid Stenosis, Cholecystectomy, Ear Surgery, Eye Surgery, Head Surgery,    Hernia Surgery, Nose Surgery, Orthopedic Surgery, Prostatectomy, Rectal Surgery,    Spinal Surgery, Testicular Surgery, Throat Surgery, Valve Replacement, Vascular     Surgery, Other Surgeries      Stroke - Family Hx:  Mother      Diabetes - Family Hx:  Brother      Cancer/Type - Family Hx:  Father      Other Family Medical History:  Mother      Is Father Still Living?:  No      Is Mother Still Living?:  Yes       Family History:  Yes      Social History:  No Tobacco Use, No Alcohol Use, No Recreational Drug use      Smoking status:  Former smoker      Anticoagulation Therapy:  No      Antibiotic Prophylaxis:  No      Medical History:  Yes: Arthritis, Blood Disease (\"POLYMYACITIS.\"), High Blood   " "  Pressure (ON MEDS), Kidney Stones (\"19 YEARS AGO, AND PASSED ANOTHER ONE HERE IN    E.D. 2-3 YRS AGO.\"), Reflux Disease (ON MEDICATION); No: Alcoholism, Allergies,     Anemia, Asthma, Broken Bones, Cataracts, Chemical Dependency,     Chemotherapy/Cancer, Chronic Bronchitis/COPD, Emphysema, Chronic Liver Disease,     Colon Trouble, Colitis, Diverticulitis, Congestive Heart Failu, Deafness or     Ringing Ears, Convulsions, Depression, Anxiety, Bipolar Disorder, Diabetes,     Epilepsy, Seizures, Forgetfullness, Glaucoma, Gall Stones, Gout, Head Injury,     Heart Attack, Heart Murmur, Hemorrhoids/Rectal Prob, Hepatitis, Hiatal Hernia,     High Cholesterol, HIV (Do not ask - volu, Jaundice, Kidney or Bladder Disease,     Migrane Headaches, Mitral Valve Prolapse, Night sweats, Phlebitis, Psychiatric     Care, Rheumatic Fever, Sexually Transmitted Dis, Shortness Of Breath, Sinus     Trouble, Skin Disease/Psoriais/Ecz, Stroke, Thyroid Problem, Tuberculosis or Pos    TB Te, Miscellaneous Medical/oth      Psychiatric History      none            PREVENTION      Hx Influenza Vaccination:  Yes      Date Influenza Vaccine Given:  Sep 1, 2019      Influenza Vaccine Declined:  No      2 or More Falls Past Year?:  No      Fall Past Year with Injury?:  No      Hx Pneumococcal Vaccination:  Yes      Encouraged to follow-up with:  PCP regarding preventative exams.      Chart initiated by      dean pan ma            ALLERGIES/MEDICATIONS      Allergies:        Coded Allergies:             SULFA (SULFONAMIDE ANTIBIOTICS) (Verified  Allergy, Unknown, 5/26/20)           VALDECOXIB (Verified  Allergy, Unknown, 5/26/20)      Medications    Last Reconciled on 5/26/20 11:56 by LOUIS GERONIMO MD      metOLazone (metOLazone) 5 Mg Tablet      10 MG PO QDAY, #60 TAB 1 Refill         Prov: Louis Geronimo         5/20/20       Furosemide* (Lasix*) 40 Mg Tablet      40 MG PO TID, #90 TAB 0 Refills         Reported         5/20/20     "   Beclomethasone Dipropionate (Qvar 80 Redihaler 10.6 GM) 10.6 Gm Hfa.aeroba      1 PUFF INH RTBID, #1 INH 9 Refills         Prov: Louis Geronimo         2/3/20       Fluticasone/Vilanterol 100-25 Mcg Inh (Breo Ellipta 100-25 Mcg Inh) 1 Each     Blst.w.dev      1 PUFF INH QDAY for 30 Days, #1 MDI 3 Refills         Prov: Louis Geronimo         1/9/20       MDI-Albuterol (Ventolin HFA) 8 Gm Hfa.aer.ad      2 PUFFS INH Q4-6H PRN for DYSPNEA, #1 INH 6 Refills         Prov: Samuel Geronimoin         12/31/19       Omeprazole (Omeprazole*) 40 Mg Capsule      40 MG PO BID, #60 CAP 9 Refills         Prov: Louis Geronimo         2/28/18       Multivitamins (Multi-Vitamin) 1 Each Tablet      1 TAB PO QDAY, #30 TAB 0 Refills         Reported         2/12/18       Aspirin Chew (Aspirin Baby) 81 Mg Tab.chew      81 MG PO QDAY, #30 TAB.CHEW 0 Refills         Reported         2/12/18       Hydroxychloroquine Sulfate (Plaquenil) 200 Mg Tab      200 MG PO QDAY, TAB         Reported         1/9/18       Lisinopril* (Lisinopril*) 20 Mg Tablet      20 MG PO HS, #30 TAB 0 Refills         Reported         1/9/18       predniSONE (predniSONE) 2.5 Mg Tablet      2.5 MG PO QDAY, TAB         Reported         1/9/18      Current Medications      Current Medications Reviewed 5/26/20            EXAM      CONSTITUTIONAL: Pleasant obese male in no acute distress,  normal conversant.       EYES : Pink conjunctive, no ptosis, PERRL.       ENMT : Nose and ears appear normal, normal dentition, mild posterior pharyngeal     wall erythema, no sinus tenderness. Mallampati classification III      Neck: Nontender, no masses, no thyromegaly, no nodules.      Resp : Bilateral diminished breath sounds, no wheezing or rhonchi. Minimal     crackles on the left base, scattered rhonchi at bases, Resonant to percussion     bilaterally.      CVS  : Mild systolic murmur heard in right fourth parasternal space.  Apical     impulse displaced laterally.        Chest wall: Normal  rise with inspiration, nontender on palpation.      GI   : Abdomen soft, with no masses, no hepatosplenomegaly, no hernias, BS+      MSK  : Normal gait and station, no digital cyanosis or clubbing       Skin : No rashes, ulcerations or lesions, normal turgor and temperature, trace     edema      Neuro: CN II - XII intact, no sensory deficits, DTRs intact and symmetrical, no     motor weakness      Psych: Appropriate affect, A   Vtials      Vitals:             Height 5 ft 8 in / 172.72 cm           Weight 239 lbs 1 oz / 108.682973 kg           BSA 2.20 m2           BMI 36.3 kg/m2           Temperature 98.5 F / 36.94 C - Oral           Pulse 87           Respirations 16           Blood Pressure 134/90 Sitting, Right Arm           Pulse Oximetry 95%, room air            REVIEW      Results Reviewed      PCCS Results Reviewed?:  Yes Prev Lab Results, Yes Prev Radiology Results, Yes     Previous Mecial Records      Lab Results      The patient's BiPAP data over the last month was reviewed.  Average daily use is    around 7 hours.  Average AHI is 5.5.             Sleep study showed AHI of 44.      =====================    ============================================================================            The patient's right and left heart catheterization from 12/06/2019 was reviewed.    The pulmonary artery systolic pressure was 83, diastolic was 37 with mean of 64.    Pulmonary capillary wedge pressure was 18, right ventricular systolic pressure     was 86, diastolic 24, right atrial mean pressure was 19.  Central aortic     systolic pressure was on 15, diastolic 74, mean of 88.  Left ventricular     systolic pressure was 109, end diastolic was 24, cardiac index was 5.73, cardiac    index was 2.55, pulmonary vascular distance was 547.  Systemic vascular     resistance was 1344.  Pulmonary vascular resistance was 6.8 by woods unit.              Left heart catheterization did not show obstructive coronary artery  disease. It     showed mildly reduced EF on echocardiogram with EF of 45%, Severe pulmonary     hypertension as evidence of right heart catheterization with mild diastolic     dysfunction.              ================================================================================    ============================            Patient: BRAXTON KNOWLES JR   Acct: #[id number]   Report: #[id number]            UNIT #: [id number]    DOS:       LOCATION:[facility]     : 1965            PROVIDERS      ADMITTING:     FAMILY:  MD NONE         OTHER:       DICTATING:  Franck Richards MD            REASON FOR VISIT:  PULM HTN            *******Signed******                                    [facility]                          Health Information Management Services                            [place]  [phone]               __________________________________________________________________________             Patient Name:                   Attending Physician:      Braxton Knowles Jr NAVIN KAINI, M.D.             Patient Visit # MR #            Admit Date  Disch Date     Location      [id number]    [id number]      2020                 [facility]- -             Date of Birth      1965      __________________________________________________________________________      835 - DIAGNOSTIC REPORT             2-D AND M-MODE ECHOCARDIOGRAM REPORT             DATE:      2020             INDICATION:                                    NORMAL RANGE           TEST RESULTS      _____________________________________________________________________                                                     Systolic     Diastolic      RVID                      0.7-2.4      LVID                      3.7-5.4           2.5         3.5      POST. WALL THICKNESS      0.8-1.1                       1.5      SEPTAL WALL THICKNESS     0.7-1.2                       1.4      LAID                       1.9-3.8                       4.1      AORTIC ROOT DIMENSION     2.0-3.7                       4.2      MTRL. VLV. GOPAL. D.D.R. 80mm/sec-150mm/sec      _____________________________________________________________________             COMMENTS:  The patient underwent 2-D, M-Mode, and Doppler echocardiogram.      The study was technically limited.  The following was observed.             1.  MITRAL VALVE:       Fibrocalcific.      2.  AORTIC VALVE:       Fibrotic.      3.  TRICUSPID VALVE:    Normal.      4.  PULMONIC VALVE:     Not well visualized.      5.  AORTIC ROOT:        Enlarged.      6.  LEFT ATRIUM:        Enlarged.      7.  LEFT VENTRICLE:     Normal in size. There is concentric left ventricular          hypertrophy. Overall left ventricular systolic function is adequate,          ejection fraction around 55%.      8.  RIGHT VENTRICLE:    Enlarged. There is abnormal motion of the septum          suggestive of right ventricular overload.      9.  RIGHT ATRIUM:       Slightly enlarged.      10. PERICARDIUM:        Unremarkable.             Doppler examination shows trace aortic regurgitation. Mild tricuspid      regurgitation. Estimated pulmonary artery systolic pressure by Doppler 160 mm      suggestive of severe pulmonary hypertension.             CONCLUSIONS:      1. Technically limited study.      2. Adequate left ventricular systolic function with wall motion abnormalities         and underlying left ventricular hypertrophy.      3. Trace aortic regurgitation.      4. Trace mitral regurgitation.      5. Mild tricuspid regurgitation.      6. Severe pulmonary hypertension.      7. Enlarged right atrium and right ventricle.             I discussed the case with Dr. Louis Geronimo.             To be electronically signed in [employer]      [id number] ROOSEVELT RAM M.D.             DANNY:vh      D:  03/04/2020 10:30      T:  03/04/2020 10:55      #[id number]             Until signed, this is an unconfirmed  preliminary report that may contain      errors and is subject to change.                   Until signed, this is an unconfirmed preliminary report that may contain      errors and is subject to change.                     <Electronically signed by Franck Richards MD>                2030               Franck Richards MD:[facility]      D:20 1030           ================================================================================      ============            Patient: BRAXTON KNOWLES JR   Acct: #[id number]   Report: #[id number]            UNIT #: [id number]    DOS:       LOCATION:[facility]     : 1965            PROVIDERS      ADMITTING:     FAMILY:  NAVEED ELIZABETH         OTHER:       DICTATING:  Lloyd Neri MD            REASON FOR VISIT:  SOA            *******Signed******                                    [facility]                          Health Information Management Services                            [place], [place]  [address]               __________________________________________________________________________             Patient Name:                   Attending Physician:      Braxton Knowles Jr NAVIN KAINI, M.D.             Patient Visit # MR #            Admit Date  Disch Date     Location      [id number]    [id number]      2018                 [facility]- -             Date of Birth      1965      __________________________________________________________________________      0835 - DIAGNOSTIC REPORT             2-D AND M-MODE ECHOCARDIOGRAM REPORT             DATE:  2018             INDICATION:                                    NORMAL RANGE                TEST RESULTS      ______________________________________________________________________                                                   Systolic       Diastolic      RVID                     0.7-2.4      LVID                    3.7-5.4      POST. WALL THICKNESS    0.8-1.1      SEPTAL WALL THICKNESS   0.7-1.2      LAID                    1.9-3.8      AORTIC ROOT DIMENSION   2.0-3.7      MTRL. VLV. GOPAL. D.D.R. 80mm/sec-150mm/sec      ______________________________________________________________________             COMMENTS:  This was a good echocardiographic study and was obtained while the      patient had sinus rhythm.  The following adequate data was obtained.             1.  The left ventricle was normal in size.  The systolic function was normal.          The estimated left ventricular ejection fraction was 60%.  No apparent          segmental wall motion abnormalities.  Mild concentric left ventricular          hypertrophy was seen.      2.  The left atrium was at the upper limits of normal size.      3.  The right atrium was normal in size.      4.  The right ventricle was mildly dilated.      5.  The aortic root was normal in motion and dimension.      6.  Mitral valve excursion was normal. There was trace mitral regurgitation.      7.  Pulmonic valve was not well seen.  There was nonsignificant pulmonic          insufficiency.      8.  The aortic valve cusps were not well see and there was nonsignificant          aortic insufficiency.  Doppler study demonstrated no stenosis.      9.  There was no tricuspid regurgitation.      10. There was no pericardial effusion.      11. There were no apparent intracardiac masses, vegetations, or thrombi.      12. Mitral inflow studies by Doppler demonstrated E/A ratio 1.3, mitral valve          deceleration time was 162 msec, IVRT was 90 msec, and E/E' was 6.0.          Average LA volume index was 23.1 mL/m2.  IVC was 2.3 cm.             CONCLUSIONS:      1.  Normal left ventricular systolic and diastolic function.      2.  Mild left ventricular hypertrophy.      3.  Mild right ventricular dilatation.      4.  Nonsignificant aortic insufficiency.      5.   Nonsignificant pulmonic insufficiency.             To be electronically signed in [employer]      [id number] MEG MACHUCA M.D.             RE:rt      D:  2018 10:22      T:  2018 12:18      #[id number]              =========================================================    =======================      Radiographic Results               [facility]                VASCULAR LAB REPORT            Patient: FREDERICK KNOWLES JR   Acct: #[id number]   Report: #[id number]            UNIT #: [id number]    DOS: 19       INSURANCE:[employer]   ORDER #:[id number]      LOCATION:[facility]     : 1965            PROVIDERS      ADMITTING:     ATTENDING: Tenisha Clayton      FAMILY:  NAVEED ELIZABETH   ORDERING:  Tenisha Clayton         OTHER:    DICTATING:  ANGELICA Moses MD            REQ #:[id number]   EXAM:VELE - VENOUS EVAL LOWER DUPLEX      REASON FOR EXAM:        REASON FOR VISIT:  LEG SWELLING BILATERAL            *******Signed******         PROCEDURE:   VENOUS EVAL LOWER DUPLEX             COMPARISON:   None.             INDICATIONS:   Bilateral leg swelling.             TECHNIQUE:   Color duplex Doppler ultrasound evaluation and analysis of the deep    venous system of       both lower extremities was performed in the usual manner.               Right Impression:       There is normal spontaneous flow  and augmentation compression responses at the     common femoral,       superficial femoral, popliteal and tibial venous positions.  Pulsatility is     present.  Venous duplex       imaging compression analysis shows normal compressible greater saphenous, common    femoral,       superficial femoral, popliteal, and tibial veins.  No intraluminal thrombus is     evident.  No       insufficiency is noted.      The common femoral vein shows partial compressibility, but seems in part to be     due to patient's       resistance to compressions  at this level due to sensitivity.  Good color flow is    noted and signal       quality is good.             Left Impression:       There is normal spontaneous flow  and augmentation compression responses at the     common femoral,       superficial femoral, popliteal and tibial venous positions.  Pulsatility is     present.  Venous duplex       imaging compression analysis shows normal compressible greater saphenous, common    femoral,       superficial femoral, popliteal, and tibial veins.  No intraluminal thrombus is     evident.  No       insufficiency is noted.      There is partial compressibility noted at the common femoral vein on the left     also and again felt       to be related to the patient's tensing resisting probe compression due to     sensitivity.  Good color       flow is noted and normal signals are present.             CONCLUSION:         1. The study is negative for an acute deep venous thrombosis in the lower     extremities.  The partial       compressible common femoral veins bilaterally is likely related to technique as     opposed       intraluminal abnormality.  See text above.              Esa Moses MD             Electronically Signed and Approved By: Esa Moses MD on 2019 at 17:43                        Until signed, this is an unconfirmed preliminary report that may contain      errors and is subject to change.                            ====================================    ======================================================                  YATGN:      D:19 1743               [facility] Diagnostic Img                PACS RADIOLOGY REPORT            Patient: FREDERICK KNOWLESVIOLETA FISH   Acct: #[id number]   Report: #[id number]            UNIT #: [id number]    DOS: 19 0800      INSURANCE:[employer] - [facility]   ORDER #:[id number]      LOCATION:[facility]     : 1965            PROVIDERS      ADMITTING:      ATTENDING: Louis Geronimo      FAMILY:  NAVEED ELIZABETH   ORDERING:  Louis Geronimo         OTHER:    DICTATING:  JOSIE VAZQUEZ MD            REQ #:[id number]   EXAM:CHWO - CT CHEST without CONTRAST      REASON FOR EXAM:        REASON FOR VISIT:  PULM NOD            *******Signed******         PROCEDURE:   CT CHEST WITHOUT CONTRAST             COMPARISON:   Other, CT, CHEST W/O CONTRAST, 12/30/2016, 13:38.  [facility], CT, CHEST       W/ CONTRAST, 9/30/2019, 16:07.             INDICATIONS:   F/U PREV ABNORMAL CT CHEST, SOA, PREV SMOKER             TECHNIQUE:   CT images were created without the administration of contrast     material.               PROTOCOL:     Standard imaging protocol performed                RADIATION:     DLP: 575.2mGy*cm          Automated exposure control was utilized to minimize radiation dose.              FINDINGS:         No pathologically enlarged lymph nodes in the chest.  Cardiomegaly.  Bilateral     pleural based       nodular opacities are unchanged dating back to 12/30/2016.  An index nodular     opacity in the medial       right lung base measures 3.3 x 1.8 cm and is unchanged.  No new dense     consolidation or new       pulmonary nodule.  No acute findings in the included upper abdomen.      Nonobstructing calculi in the       right kidney.  Renal cysts.  No aggressive appearing bone change.             CONCLUSION:   Stable pleural-based nodular opacities dating back to at least     12/30/2016, in keeping       with a benign finding.              JOSIE VAZQUEZ MD             Electronically Signed and Approved By: JOSIE VAZQUEZ MD on 12/18/2019 at 8:40                        Until signed, this is an unconfirmed preliminary report that may contain      errors and is subject to change.                            ============    ============================================================================                  PHILER:      D:12/18/19 0840               [facility]  [facility]                PACS RADIOLOGY REPORT            Patient: FREDERICK KNOWLES JR   Acct: #[id number]   Report: #[id number]            UNIT #: [id number]    DOS: 20 0732      INSURANCE:[employer] - [employer]   ORDER #:[id number]      LOCATION:NM     : 1965            PROVIDERS      ADMITTING:     ATTENDING: Louis Geronimo      FAMILY:  NAVEED ELIZABETH   ORDERING:  Louis Geronimo         OTHER:    DICTATING:  Yony Garduno MD            REQ #:[id number]   EXAM:LSVP - LUNG SCAN VENT PERFUSION      REASON FOR EXAM:  COPD      REASON FOR VISIT:  COPD            *******Signed******         PROCEDURE:   LUNG SCAN VENTILATION AND PERFUSION             COMPARISON:   [facility], CR, CHEST PA/AP   8:52.  [facility], CT, CHEST W/O CONTRAST, 2019, 8:09.             INDICATIONS:   COPD/ dyspnea on exertion             TECHNIQUE:   After obtaining the patient's consent, a ventilation/perfusion scan    was obtained in the       usual manner.        RADIONUCLIDE:         54 mCi     Tc99m DTPA - AEROSOL      RADIONUCLIDE #2:    4.3 mCi    Tc99m MAA - I.V.             FINDINGS:         The ventilation and perfusion images in this patient are normal.  The     accompanying chest radiograph       shows no acute process.             CONCLUSION:   Normal ventilation perfusion lung scan.  This excludes the     diagnosis of pulmonary       embolism with a very high degree of clinical certainty              Yony Garduno MD             Electronically Signed and Approved By: Yony Garduno MD on 2020 at 9:38                        Until signed, this is an unconfirmed preliminary report that may contain      errors and is subject to change.                                              DAKOTAO:      D:20 0938      PFT Results      Patient: FREDERICK KNOWLES JR   Acct: #[id number]   Report: #[id number]             UNIT #: [id number]    DOS:       LOCATION:NM     : 1965            PROVIDERS      ADMITTING:     FAMILY:  NAVEED ELIZABETH         OTHER:       DICTATING:  Louis Geronimo MD            REASON FOR VISIT:  COPD            *******Signed******                                    [facility]                          [employer]                            [place]  [id number]               __________________________________________________________________________             Patient Name:                   Attending Physician:      Braxton Mcgehe Jr NAVIN KAINI, M.D.             Patient Visit # MR #            Admit Date  Disch Date     Location      [id number]    [id number]      2020                 NM- -             Date of Birth      1965      __________________________________________________________________________      0821 - DIAGNOSTIC REPORT             PULMONARY FUNCTION TEST             SPIROMETRY:      1.  Spirometry shows severe restrictive defect.      2.  FEV1/FVC is 74%.      3.  FEV1 is 1.62 L, 46% of predicted.      4.  FVC is 2.20 L, 48% of predicted.             BRONCHODILATOR RESPONSE:      1.  There is a significant response to bronchodilator administration.      2.  FEV1 increased from 1.62 L to 1.82 L, 12% change.      3.  FVC increased from 2.20 L to 2.72 L, 3% change.             LUNG VOLUMES:      1.  Lung volumes confirm restriction.      2.  Total lung capacity is 3.71 L, 56% of predicted.      3.  Residual volume is 1.44 L, 69% of predicted.             DIFFUSION:      Diffusion capacity is mildly decreased, 68% of predicted.             FLOW VOLUME LOOP:      Flow volume loop is compatible with a restrictive process.             COMPARISON:      Compared to pulmonary function test from 2018:      1.  Spirometry parameter shows improvement with FEV1 increased from 1.27 L to          1.62 L,  27% change.      2.  FVC increased from 1.71 L to 2.20 L, 28% change.      3.  Diffusion capacity has remained stable.      4.  Total lung capacity has increased from 3.33 L to 3.71 L, 11% change.      5.  Residual volume has decreased from 1.52 L to 1.44, 6% change.             CONCLUSION:      Normal FEV1/FVC with low FEV1 and FVC, with low lung volumes and low      diffusion capacity suggests severe restrictive airway disease, likely      pneumonitis or interstitial lung disease.  Underlying bronchial reversibility      suggests secondary reactive airway disease component as well.  Please      correlate clinically.             Overall his lung function parameters have improved over the last year.             To be electronically signed in [employer]      [id number] LOUIS GERONIMO M.D.             NK:rt      D:  01/29/2020 08:31      T:  01/29/2020 09:02      #[id number]             Until signed, this is an unconfirmed preliminary report that may contain      errors and is subject to change.                   Until signed, this is an unconfirmed preliminary report that may contain      errors and is subject to change.                     <Electronically signed by Louis Geronimo MD>                02/03/20 1238               Louis Geronimo MD:RJT      D:01/29/20 0831            Assessment      Pulmonary hypertension - I27.20            Pulmonary nodule - R91.1            CHF (congestive heart failure)         Chronic diastolic congestive heart failure - I50.32         Heart failure type: diastolic         Heart failure chronicity: chronic            Notes      New Diagnostics      * Comp Metabolic Panel, Month         Dx: Pulmonary hypertension - I27.20      * CBC, Month         Dx: Pulmonary hypertension - I27.20      * Probrain Natriuretic, Routine         Dx: Pulmonary hypertension - I27.20      New Referrals      * Cardiology, Routine         CHARITY SETH          Dx: CHF (congestive heart failure) - I50.9      PLAN:      The patient is a 55 year old male with severe sleep apnea, obesity, severe     obstructive and restrictive airway disease, history of systemic lupus. He has     right heart enlargement and possible right heart failure secondary to likely     diastolic heart failure and mild systolic heart failure along with chronic lung     disease and sleep apnea.              1. Pulmonary hypertension.  Likely related to class 2 and 3 as well as sleep     apnea.  Given his syncopal episodes and disproportionately increased PA     pressure, I am concerned if there was anything to offer even with his underlying    issues. PA pressure is significantly high and has significant right heart     enlargement with PA pressure of 60 mmHg.  His echocardiogram in 2018 showed     normal EF and normal diastolic function.  After he had acute congestive heart     failure exacerbation in 10/2019 his echocardiogram showed severe pulmonary     hypertension, that seemed to have coinciding with his combiined systolic and     diastolic heart failure diagnosis.  I will make a referral to Dr. Moreno,     Pulmonary Hypertension Clinic at [facility].  Overall he has been     treated for sleep apnea.  He is seeing cardiologist service in [place] and    continues to get diuresed.  He is on Qvar.  I will continue with these for now.     BNP today was 1485.             2.  He has systemic lupus and ILD can cause pulmonary hypertension as well, but     his pulmonary artery pressure was much lower prior to cardiac event.        3. I will follow up with him in one month after evaluation in pulmonary     hypertension clinic. I will also have him referred to local cardiology service     to Dr. David as he wants to see a cardiologist locally.        4. Follow up in one month, earlier if needed.            Patient Education      Education resources provided:  Yes      Patient  Education Provided:  Pulmonary Hypertension            Electronically signed by Louis Geronimo  05/26/2020 16:27       Disclaimer: Converted document may not contain table formatting or lab diagrams. Please see [employer] for the authenticated document.

## 2021-05-28 NOTE — PROGRESS NOTES
Patient: FREDERICK KNOWLES JR     Acct: [id number]     Report: #[id number]  UNIT #: [id number]     : 1965    Encounter Date:2018  PRIMARY CARE: NAVEED ELIZABETH  ***Signed***  --------------------------------------------------------------------------------------------------------------------  Chief Complaint      Encounter Date      2018            Referring Provider      Tenisha Clayton            Patient Complaint      Patient is complaining of      New pt here for lung nodule            VITALS      Height 5 ft 8 in / 172.72 cm      Weight 252 lbs 6 oz / 114.169114 kg      BSA 2.39 m2      BMI 38.4 kg/m2      Temperature 97.7 F / 36.5 C - Temporal      Pulse 88      Respirations 14      Blood Pressure 157/91 Sitting, Left Arm      Pulse Oximetry 93%, Room air            HPI      The patient is a 52 year old male who sees Dr. SUSANNE Clayton for polymyositis. He is     referred to us for lung nodule.             The patient has had a diagnosis of pulmonary embolism more than 10 years ago.     At that time he was treated with Warfarin for 1.5 years. He had CT scan of the     chest on 16 which showed a persistent area of nodular irregularity along     the lung base similar in size bilaterally. Nodular irregularly in right lower     lobe was 4.1 cm and nodular density in the left base was 3.7 cm. There were     also areas of more nodular and globular appearance in the right middle lobe     which was slightly predominant and prominent. He had the CT scan of the chest     at [facility] in [place]. He continued to have cough off and     on and over the last 2.5 months he has had persistent cough. No fevers or     chills or night sweats. He has shortness of breath with activities. He wakes up     at night with shortness of breath. He has clear to yellow phlegm and at times     he has green phlegm production. He has wheezing. No nausea and vomiting, no     weight loss or loss of  appetite. He used to smoke 1 pack a day for 31 years, he     quit smoking in 2015. 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 There is a family     history of congestive heart failure in his mother but no history of lung     disease or lung cancer.            ROS      Constitutional:  Complains of: Fatigue, Denies: Fever, Weight gain, Weight loss    , Chills, Insomnia, Other      Respiratory/Breathing:  Complains of: Shortness of air, Wheezing, Cough, Denies    : Hemoptysis, Pleuritic pain, Other      Endocrine:  Denies: Polydipsia, Polyuria, Heat/cold intolerance, Diabetes, Other      Eyes:  Denies: Blurred vision, Vision Changes, Other      Ears, nose, mouth, throat:  Denies: Mouth lesions, Thrush, Throat pain,     Hoarseness, Allergies/Hay Fever, Post Nasal Drip, Headaches, Recent Head Injury    , Nose Bleeding, Neck Stiffness, Thyroid Mass, Hearing Loss, Ear Fullness, Dry     Mouth, Nasal or Sinus Pain, Dry Lips, Nasal discharge, Nasal congestion, Other      Cardiovascular:  Denies: Palpitations, Syncope, Claudication, Chest Pain, Wake     up Gasping for air, Leg Swelling, Irregular Heart Rate, Cyanosis, Dyspnea on     Exertion, Other      Gastrointestinal:  Denies: Nausea, Constipation, Diarrhea, Abdominal pain,     Vomiting, Difficulty Swallowing, Reflux/Heartburn, Dysphagia, Jaundice, Bloating    , Melena, Bloody stools, Other      Genitourinary:  Denies: Urinary frequency, Incontinence, Hematuria, Urgency,     Nocturia, Dysuria, Testicular problems, Other      Musculoskeletal:  Denies: Joint Pain, Joint Stiffness, Joint Swelling, Myalgias    , Other      Hematologic/lymphatic:  DENIES: Lymphadenopathy, Bruising, Bleeding tendencies,     Other      Neurological:  Denies: Headache, Numbness, Weakness, Seizures, Other      Psychiatric:  Denies: Anxiety, Appropriate Effect, Depression, Other      Sleep:  No: Excessive daytime sleep,  "Morning Headache?, Snoring, Insomnia?,     Stop breathing at sleep?, Other      Integumentary:  Denies: Rash, Dry skin, Skin Warm to Touch, Other      Immunologic/Allergic:  Denies: Latex allergy, Seasonal allergies, Asthma,     Urticaria, Eczema, Other      Immunization status:  No: Up to date            FAMILY/SOCIAL/MEDICAL HX      Surgical History:  Yes: Appendectomy (AGE 12YR), Kidney Surgery (Kidney stones)      Stroke - Family Hx:  Mother      Diabetes - Family Hx:  Brother      Cancer/Type - Family Hx:  Father      Other Family Medical History:  Mother      Is Father Still Living?:  No      Is Mother Still Living?:  Yes      Smoking status:  Former smoker (1 ppd x 25 years, quit 2015)      Anticoagulation Therapy:  No      Antibiotic Prophylaxis:  No      Medical History:  Yes: Arthritis, Blood Disease (\"POLYMYACITIS.\"), High Blood     Pressure, Kidney Stones (\"19 YEARS AGO, AND PASSED ANOTHER ONE HERE IN E.D. 2-3     YRS AGO.\"), Reflux Disease (ON MEDICATION), No: Chemotherapy/Cancer, Deafness     or Ringing Ears            Hx Influenza Vaccination:  No      Influenza Vaccine Declined:  Yes      2 or More Falls Past Year?:  No      Fall Past Year with Injury?:  No      Hx Pneumococcal Vaccination:  No      Encouraged to follow-up with:  PCP regarding preventative exams.      Chart initiated by      Geneva Redman MA            ALLERGIES/MEDICATIONS      Allergies:        Coded Allergies:             SULFA (SULFONAMIDE ANTIBIOTICS) (Verified  Allergy, Unknown, 1/8/18)           VALDECOXIB (Verified  Allergy, Unknown, 1/8/18)      Uncoded Allergies:             Sulfa (Allergy, Unknown, 4/25/06)           VALDECOXIB (Allergy, Unknown, 4/25/06)      Medications    Last Reconciled on 1/9/18 16:32 by MICHAEL MARTINEZ MD      Naproxen (Naprosyn EC) 375 Mg Tablet.      375 MG PO QDAY, TAB         Reported         1/9/18       Hydroxychloroquine Sulfate (Plaquenil*) 200 Mg Tab      200 MG PO QDAY, TAB         Reported "         1/9/18       Lisinopril* (Lisinopril*) 20 Mg Tablet      20 MG PO HS, #30 TAB 0 Refills         Reported         1/9/18       predniSONE* (predniSONE*) 2.5 Mg Tablet      2.5 MG PO QDAY, TAB         Reported         1/9/18      Current Medications      Current Medications Reviewed 1/8/18            EXAM      CONSTITUTIONAL: Pleasant male in no acute distress, normal conversant.       EYES : Pink conjunctive, no ptosis, PERRL.       ENMT : Nose and ears appear normal, normal dentition, mild posterior pharyngeal     wall erythema. Mallampati classification II, no sinus tenderness.       Neck: Nontender, no masses, no thyromegaly, no nodules.      Resp : Bilateral diminished breath sounds, resonant to percussion bilaterally,     scattered expiratory wheezing, no crackles or rhonchi.      CVS  : No carotid bruits, s1s2 nl, RRR, no murmur, rubs or gallop, no     peripheral edema       Chest wall: Normal rise with inspiration, nontender on palpation      GI   : Abdomen soft, with no masses, no hepatosplenomegaly, no hernias, BS+      MSK  : Normal gait and station, no digital cyanosis or clubbing       Skin : No rashes, ulcerations or lesions, normal turgor and temperature      Neuro: CN II - XII intact, no sensory deficits, DTRs intact and symmetrical, no     motor weakness      Psych: Appropriate affect, A   Vtials      Vitals:             Height 5 ft 8 in / 172.72 cm           Weight 252 lbs 6 oz / 114.878724 kg           BSA 2.39 m2           BMI 38.4 kg/m2           Temperature 97.7 F / 36.5 C - Temporal           Pulse 88           Respirations 14           Blood Pressure 157/91 Sitting, Left Arm           Pulse Oximetry 93%, Room air            REVIEW      Results Reviewed      PCCS Results Reviewed?:  Yes Prev Lab Results, Yes Prev Radiology Results, Yes     Previous [facility] Records      Radiographic Results      The patient's CT scan of the chest from [facility] from 12/30/16     showed  persistent areas of nodular irregularity along the lung bases similar in     size, bilateral nodular area of right lower lobe 4.1 cm and in nodular base,     3.7 cm. There was a right middle lobe nodular opacity which is slightly     prominent compared to the past.            PLAN      Assessment      Abnormal CT scan of lung - R91.8            Chronic cough - R05            Notes      New Medications      * predniSONE* 2.5 MG TABLET: 2.5 MG PO QDAY       Replaced Prednisone (predniSONE*) 1 MG TAB:             * Lisinopril* 20 MG TABLET: 20 MG PO HS #30       Replaced Hctz/Lisinopril (Zestoretic 10/12.5*) 1 TAB TABLET:             * Hydroxychloroquine Sulfate (Plaquenil*) 200 MG TAB: 200 MG PO QDAY      * Naproxen (Naprosyn EC) 375 MG TABLET.DR: 375 MG PO QDAY      New Diagnostics      * Chest W/O Cont CT, 1 DAY       Dx: Abnormal CT scan of lung - R91.8      * PFT-Comp, PrePost,DLCO,BodyBox, Week       Dx: Abnormal CT scan of lung - R91.8      * 6 Min Walk With Pulse Ox, Routine       Dx: Chronic cough - R05      * Immunoglobulin  E (I, Week       Dx: Chronic cough - R05      PLAN:      The patient is a 52 year old male with history of smoking in the past,     persistent cough with nodular opacities bilaterally concerning for chronic     indolent infection. There is also concern for malignancy or other inflammatory     process.             1. CT scan abnormality. I will repeat CT scan of the chest now. The last CT     scan was from more than a year ago. He has persistent symptoms and will likely     need bronchoscopy with bronchoalveolar lavage and possible biopsies. I will     also check pulmonary function tests and six minute walk test. I will check     serum IgE level.             2. I will follow up with him in 2-3 weeks if needed, if the CT scan shows     significant abnormality. Otherwise he can followed up in 6-8 weeks.            Patient Education      Education resources provided:  Yes      Patient  Education Provided:  Acute Asthma, Acute Bronchitis                 Disclaimer: Converted document may not contain table formatting or lab diagrams. Please see [employer] for the authenticated document.

## 2021-05-28 NOTE — PROGRESS NOTES
Patient: FREDERICK KNOWLES JR     Acct: [id number]     Report: #[id number]  UNIT #: [id number]     : 1965    Encounter Date:2018  PRIMARY CARE: NAVEED ELIZABETH  ***Signed***  --------------------------------------------------------------------------------------------------------------------  Chief Complaint      Encounter Date      Dec 7, 2018            Primary Care Provider      Tenisha Clayton            Referring Provider      Tenisha Clayton            Patient Complaint      Patient is complaining of      follow up ct            VITALS      Height 5 ft 8 in / 172.72 cm      Weight 261 lbs 3 oz / 118.725571 kg      BSA 2.29 m2      BMI 39.7 kg/m2      Temperature 98.1 F / 36.72 C - Oral      Pulse 75      Respirations 16      Blood Pressure 134/76 Sitting, Right Arm      Pulse Oximetry 93%, room air      Initial Exhaled Nitrous Oxide      Exhaled Nitrous Oxide Results:  11            HPI      The patient is a 53 year old male with a history of smoking in the past, chronic    persistent cough with nodular opacities bilaterally concerning for chronic     indolent infection versus malignancy.  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  He is here for     follow up.  Since his last office visit he has been okay overall. I had ordered     repeat CT scan of the chest and this was done at 2018 which was reviewed     with him today.  CT scan shows stable lung nodule. In fact the lung nodule is     slightly improved compared to the past.  He has no fever, chills, nausea,     vomiting, chest pain or chest tightness.  Overall over the last several weeks,     he feels like his breathing is some better. He tries to do treadmill 10-15     minutes in a day and feels like he has been doing somewhat better. I discussed     with him regarding pulmonary rehab, he wants to hold off on it now.  He did not     feel like significant improvement with  inhalers in the past, but is willing to     try it now.  He complains that he has increasing cough and secretions early in     the morning and has to cough and clear his throat for 10-15 minutes in the     morning.            ROS      Constitutional:  Complains of: Fatigue; Denies: Fever, Weight gain, Weight loss,    Chills, Insomnia, Other      Respiratory/Breathing:  Complains of: Shortness of air; Denies: Wheezing, Cough,    Hemoptysis, Pleuritic pain, Other      Endocrine:  Denies: Polydipsia, Polyuria, Heat/cold intolerance, Diabetes, Other      Eyes:  Denies: Blurred vision, Vision Changes, Other      Ears, nose, mouth, throat:  Denies: Mouth lesions, Thrush, Throat pain,     Hoarseness, Allergies/Hay Fever, Post Nasal Drip, Headaches, Recent Head Injury,    Nose Bleeding, Neck Stiffness, Thyroid Mass, Hearing Loss, Ear Fullness, Dry     Mouth, Nasal or Sinus Pain, Dry Lips, Nasal discharge, Nasal congestion, Other      Cardiovascular:  Denies: Palpitations, Syncope, Claudication, Chest Pain, Wake     up Gasping for air, Leg Swelling, Irregular Heart Rate, Cyanosis, Dyspnea on     Exertion, Other      Gastrointestinal:  Denies: Nausea, Constipation, Diarrhea, Abdominal pain,     Vomiting, Difficulty Swallowing, Reflux/Heartburn, Dysphagia, Jaundice,     Bloating, Melena, Bloody stools, Other      Genitourinary:  Denies: Urinary frequency, Incontinence, Hematuria, Urgency,     Nocturia, Dysuria, Testicular problems, Other      Musculoskeletal:  Denies: Joint Pain, Joint Stiffness, Joint Swelling, Myalgias,    Other      Hematologic/lymphatic:  DENIES: Lymphadenopathy, Bruising, Bleeding tendencies,     Other      Neurological:  Denies: Headache, Numbness, Weakness, Seizures, Other      Psychiatric:  Denies: Anxiety, Appropriate Effect, Depression, Other      Sleep:  No: Excessive daytime sleep, Morning Headache?, Snoring, Insomnia?, Stop    breathing at sleep?, Other      Integumentary:  Denies: Rash, Dry skin,  "Skin Warm to Touch, Other      Immunologic/Allergic:  Denies: Latex allergy, Seasonal allergies, Asthma,     Urticaria, Eczema, Other      Immunization status:  No: Up to date            FAMILY/SOCIAL/MEDICAL HX      Surgical History:  Yes: Appendectomy (AGE 12YR), Bowel Surgery (COLONOSCOPY),     Kidney Surgery (Kidney stones), Oral Surgery (TEETH REMOVED); No: AAA Repair,     Abdominal Surgery, Angioplasty, Back Surgery, Bladder Surgery, Breast Surgery,     CABG, Carotid Stenosis, Cholecystectomy, Ear Surgery, Eye Surgery, Head Surgery,    Hernia Surgery, Nose Surgery, Orthopedic Surgery, Prostatectomy, Rectal Surgery,    Spinal Surgery, Testicular Surgery, Throat Surgery, Valve Replacement, Vascular     Surgery, Other Surgeries      Stroke - Family Hx:  Mother      Diabetes - Family Hx:  Brother      Cancer/Type - Family Hx:  Father      Other Family Medical History:  Mother      Is Father Still Living?:  No      Is Mother Still Living?:  Yes       Family History:  Yes      Social History:  No Tobacco Use, No Alcohol Use, No Recreational Drug use      Smoking status:  Former smoker (1 ppd x 25 years, quit 2015)      Anticoagulation Therapy:  No      Antibiotic Prophylaxis:  No      Medical History:  Yes: Arthritis, Blood Disease (\"POLYMYACITIS.\"), High Blood     Pressure (ON MEDS), Kidney Stones (\"19 YEARS AGO, AND PASSED ANOTHER ONE HERE IN    E.D. 2-3 YRS AGO.\"), Reflux Disease (ON MEDICATION); No: Alcoholism, Allergies,     Anemia, Asthma, Broken Bones, Cataracts, Chemical Dependency,     Chemotherapy/Cancer, Chronic Bronchitis/COPD, Emphysema, Chronic Liver Disease,     Colon Trouble, Colitis, Diverticulitis, Congestive Heart Failu, Deafness or     Ringing Ears, Convulsions, Depression, Anxiety, Bipolar Disorder, Diabetes,     Epilepsy, Seizures, Forgetfullness, Glaucoma, Gall Stones, Gout, Head Injury,     Heart Attack, Heart Murmur, Hemorrhoids/Rectal Prob, Hepatitis, Hiatal Hernia,     High Cholesterol, " HIV (Do not ask - volu, Jaundice, Kidney or Bladder Disease,     Migrane Headaches, Mitral Valve Prolapse, Night sweats, Phlebitis, Psychiatric     Care, Rheumatic Fever, Sexually Transmitted Dis, Shortness Of Breath, Sinus     Trouble, Skin Disease/Psoriais/Ecz, Stroke, Thyroid Problem, Tuberculosis or Pos    TB Te, Miscellaneous Medical/oth      Psychiatric History      none            PREVENTION      Hx Influenza Vaccination:  Yes      Date Influenza Vaccine Given:  Sep 1, 2018      Influenza Vaccine Declined:  No      2 or More Falls Past Year?:  No      Fall Past Year with Injury?:  No      Hx Pneumococcal Vaccination:  Yes      Encouraged to follow-up with:  PCP regarding preventative exams.      Chart initiated by      dean pan ma            ALLERGIES/MEDICATIONS      Allergies:        Coded Allergies:             SULFA (SULFONAMIDE ANTIBIOTICS) (Verified  Allergy, Unknown, 12/7/18)           VALDECOXIB (Verified  Allergy, Unknown, 12/7/18)      Medications    Last Reconciled on 12/7/18 11:49 by LOUIS GERONIMO MD      Omeprazole (Omeprazole*) 40 Mg Capsule      40 MG PO BID, #60 CAP 9 Refills         Prov: Louis Geronimo         2/28/18       Multivitamins (Multi-Vitamin) 1 Each Tablet      1 TAB PO QDAY, #30 TAB 0 Refills         Reported         2/12/18       Aspirin Chew (Aspirin Baby) 81 Mg Tab.chew      81 MG PO QDAY, #30 TAB.CHEW 0 Refills         Reported         2/12/18       Naproxen (Naprosyn EC) 375 Mg Tablet.dr      375 MG PO QDAY, TAB         Reported         1/9/18       Hydroxychloroquine Sulfate (Plaquenil*) 200 Mg Tab      200 MG PO QDAY, TAB         Reported         1/9/18       Lisinopril* (Lisinopril*) 20 Mg Tablet      20 MG PO HS, #30 TAB 0 Refills         Reported         1/9/18       predniSONE* (predniSONE*) 2.5 Mg Tablet      2.5 MG PO QDAY, TAB         Reported         1/9/18      Current Medications      Current Medications Reviewed 12/7/18            EXAM      CONSTITUTIONAL:  Pleasant male in no acute distress, normal conversant.       EYES : Pink conjunctive, no ptosis, PERRL.       ENMT : Nose and ears appear normal, normal dentition, mild posterior pharyngeal     wall erythema. Mallampati classification II, no sinus tenderness.       Neck: Nontender, no masses, no thyromegaly, no nodules.      Resp : Bilateral diminished breath sounds, resonant to percussion bilaterally,     scattered expiratory wheezing, no crackles or rhonchi.      CVS  : No carotid bruits, s1s2 nl, RRR, no murmur, rubs or gallop, no peripheral    edema       Chest wall: Normal rise with inspiration, nontender on palpation      GI   : Abdomen soft, with no masses, no hepatosplenomegaly, no hernias, BS+      MSK  : Normal gait and station, no digital cyanosis or clubbing       Skin : No rashes, ulcerations or lesions, normal turgor and temperature      Neuro: CN II - XII intact, no sensory deficits, DTRs intact and symmetrical, no     motor weakness      Psych: Appropriate affect, A   Vtials      Vitals:             Height 5 ft 8 in / 172.72 cm           Weight 261 lbs 3 oz / 118.489641 kg           BSA 2.29 m2           BMI 39.7 kg/m2           Temperature 98.1 F / 36.72 C - Oral           Pulse 75           Respirations 16           Blood Pressure 134/76 Sitting, Right Arm           Pulse Oximetry 93%, room air            REVIEW      Results Reviewed      PCCS Results Reviewed?:  Yes Prev Lab Results, Yes Prev Radiology Results, Yes     Previous Mecial Records      Radiographic Results               [facility]                PACS RADIOLOGY REPORT            Patient: FREDERICK KNOWLES JR   Acct: #[id number]   Report: #[id number]            UNIT #: [id number]    DOS: 18       INSURANCE:[employer] - PPO   ORDER #:[id number]      LOCATION:[facility]     : 1965            PROVIDERS      ADMITTING:     ATTENDING: Louis Geronimo      FAMILY:   NAVEED ELIZABETH   ORDERING:  Louis Geronimo         OTHER:    DICTATING:  Dave Cordoba MD            REQ #:[id number]   EXAM:CHWO - CT CHEST without CONTRAST      REASON FOR EXAM:  SOA      REASON FOR VISIT:  SOA            *******Signed******         PROCEDURE:   CT CHEST WITHOUT CONTRAST             COMPARISON:   [facility], CT, CHEST W CONTRAST R/O PE, 3/28/2008,     14:10.  [facility], CT, CHEST W/O CONTRAST, 1/25/2018, 9:42.  [facility], CT,       CHEST W/O CONTRAST, 8/28/2018, 10:23.             INDICATIONS:   BILATERAL SOLITARY PULMONARY LUNG NODULES             TECHNIQUE:   CT images were created without the administration of contrast     material.               PROTOCOL:     Standard imaging protocol performed                RADIATION:     DLP: 770mGy*cm          Automated exposure control was utilized to minimize radiation dose.              FINDINGS:         There are some nonspecific small mediastinal lymph nodes which have been noted.     On evaluation of       the lung windows, there are pleural-based pulmonary nodular densities in the     right middle lobe       area.  The largest seen on image 40 measures 2.1 x 1.4 cm previously measuring     2.5 x 2 cm.  There       are posterior mediastinal perispinal areas of soft tissue density in the basilar    regions.  On the       left this measures 2.2 x 1.8 cm.  On the right 2.4 x 1.7 cm best defined on     image 47.  This       previously measured 2.5 x 1.95 and 3.1 x 1.7 cm respectively.             There is some atelectasis in the basilar areas, right middle lobe and lingula.      There is a tiny       noncalcified pulmonary nodule in left upper lobe seen on image 23 measuring 0.28    cm.             Lower slices through the upper abdomen reveal a right renal cyst.  There is a     vague hypodense area       within the liver in segment 5 difficult to completely characterize but suggested    on the  prior       study.  There is a left adrenal lesion which could reflect adenoma.             CONCLUSION:         1. There remains pulmonary nodular densities in the right middle lobe area as     well as a small       pulmonary nodule left upper lobe and pleural-based soft tissue densities in the     basilar regions.        These could be secondary to prior inflammatory/infectious process.  Continued     followup would be       recommended.      2. There is some atelectasis in the basilar areas ,right middle lobe and li    ngula.      3. Vague hypodense area within the liver for which reassessment at time of     followup would be       recommended .      4.  Right renal cyst      5. Left adrenal lesion which could reflect adenoma.              GEE THAO MD             Electronically Signed and Approved By: GEE THAO MD on 12/04/2018 at 16:08                        Until signed, this is an unconfirmed preliminary report that may contain      errors and is subject to change.                                              KAMCR:      D:12/04/18 1608      PFT Results      [id number]  [id number]                                 [facility]                          [employer]                            [place], [place]  [address]               __________________________________________________________________________             Patient Name:                   Attending Physician:      Braxton Mcghee Jr NAVIN KAINI, M.D.             Patient Visit # MR #            Admit Date  Disch Date     Location      [id number]    [id number]      01/25/2018                 CT- -             Date of Birth      1965      __________________________________________________________________________      0821 - DIAGNOSTIC REPORT             PULMONARY FUNCTION TEST             SPIROMETRY:      Spirometry shows possible severe restrictive defect.      FEV1/FVC  ratio is 74%.      FEV1 is 1.27 L, 35% of predicted.      FVC is 1.71 L, 37% of predicted.             BRONCHODILATOR RESPONSE:      There is a significant response to bronchodilator administration.      FEV1 increased from 1.27 L to 1.50 L, 18% change.      FVC increased from 1.71 L to 1.98 L, 16% change.             LUNG VOLUMES:      Lung volumes confirmed restriction.      Total lung capacity is 3.33 L, 50% of predicted.      Residual volume is 1.52 L, 74% of predicted.             DIFFUSION:      Diffusion capacity is mildly decreased, 66% of predicted.             FLOW VOLUME LOOP:      Flow volume loop compatible with restrictive process.             CONCLUSION:      Normal FEV1/FVC with low FEV1 and FVC, with low lung capacity and lung      volumes with low diffusion capacity.  It suggests severe restrictive airway      disease, likely mixture of body habitus and pulmonary parenchymal disease.      There is significant bronchial reversibility seen as well.  Please correlate      clinically.             To be electronically signed in "[employer]"      [id number] LOUIS GERONIMO M.D.             NK:rt      D:  02/08/2018 12:47      T:  02/08/2018 12:54      #[id number]             Until signed, this is an unconfirmed preliminary report that may contain      errors and is subject to change.                   02/12/18 7277  <Electronically signed by Louis Geronimo MD>            Assessment      Lung nodule - R91.1            Notes      Discontinued Medications      * predniSONE* 20 MG TABLET: 40 MG PO QDAY #10      New Diagnostics      * Chest W/O Cont CT, 6 Months         Dx: Lung nodule - R91.1      PLAN:      The patient is a 53 year old male with a history of smoking in the past, chronic    persistent cough with nodular opacities bilaterally concerning for chronic     indolent infection versus malignancy. He has lung nodule and connective tissue     disease possible inflammatory myositis or systemic lupus.               1. CT  scan abnormality.  One of the lung nodules was mildly hypermetabolic with     SUV of 2.8 and it was slightly enlarging in size.  I repeated CT scan and this     shows that the lung nodule is either stable or maybe slightly improved as well.     Given his persistence of lung nodule and slightly enlarging over time, I will     again consider repeating CT scan of the chest in 06/2019.  I will at least     follow the CT scan until early 2020.        2.  Obstructive airway disease with reversibility.  I will start him on Qvar     now. He had problems with inhalers in the past, so I will check if this would     help. I also offered pulmonary rehab, he wants to hold off on it now. He wants     to work on himself and do treadmill at home, slightly increase it as possible.     Other management per Dr. Clayton.        3. Follow up with him in six months, earlier if needed.  Weight loss counseling     was done.            Patient Education      Education resources provided:  Yes      Patient Education Provided:  Acute Bronchitis                 Disclaimer: Converted document may not contain table formatting or lab diagrams. Please see [employer] for the authenticated document.

## 2021-05-28 NOTE — PROGRESS NOTES
Patient: FREDERICK KNOWLES JR     Acct: [id number]     Report: #[id number]  UNIT #: [id number]     : 1965    Encounter Date:10/19/2018  PRIMARY CARE: NAVEED ELIZABETH  ***Signed***  --------------------------------------------------------------------------------------------------------------------  Chief Complaint      Encounter Date      Oct 19, 2018            Primary Care Provider      Tenisha Clayton            Referring Provider      Tenisha Clayton            Patient Complaint      Patient is complaining of      3-4 week follow up            VITALS      Height 5 ft 8 in / 172.72 cm      Weight 262 lbs 6 oz / 119.151453 kg      BSA 2.29 m2      BMI 39.9 kg/m2      Temperature 98.3 F / 36.83 C - Oral      Pulse 73      Respirations 16      Blood Pressure 144/84 Sitting, Right Arm      Pulse Oximetry 94%, room air      Exhaled Nitrous Oxide Testin            HPI      The patient is a 53 year old male with a history of smoking in the past, chronic    persistent cough with nodular opacities bilaterally concerning for a chronic     indolent infection versus malignancy. He also had lung nodule and connective     tissue disease, possibly inflammatory myositis or systemic lupus.  He is here     for follow up.  Since his last office visit, because of a hypermetabolic lung     nodule which was slightly increasing in size over the last two years I had     ordered a transthoracic needle needle biopsy.  I had also talked to radiologist,    Dr. Edmond.  He went for the procedure, but given the risk of pneumothorax and     possible diaphragm injury he wanted to hold off on it. He does not have any     symptoms. He has minimal cough which is at baseline. Cough is worse early in the    morning, otherwise he is doing okay. He continues to take azelastine nasal spray    and albuterol as needed.  He has no fever, chills, nausea or vomiting. He has no    significant change in symptoms compared to how he was two months  ago.            ROS      Constitutional:  Complains of: Fatigue; Denies: Fever, Weight gain, Weight loss,    Chills, Insomnia, Other      Respiratory/Breathing:  Complains of: Shortness of air, Cough; Denies: Wheezing,    Hemoptysis, Pleuritic pain, Other      Endocrine:  Denies: Polydipsia, Polyuria, Heat/cold intolerance, Diabetes, Other      Eyes:  Denies: Blurred vision, Vision Changes, Other      Ears, nose, mouth, throat:  Denies: Mouth lesions, Thrush, Throat pain,     Hoarseness, Allergies/Hay Fever, Post Nasal Drip, Headaches, Recent Head Injury,    Nose Bleeding, Neck Stiffness, Thyroid Mass, Hearing Loss, Ear Fullness, Dry     Mouth, Nasal or Sinus Pain, Dry Lips, Nasal discharge, Nasal congestion, Other      Cardiovascular:  Denies: Palpitations, Syncope, Claudication, Chest Pain, Wake     up Gasping for air, Leg Swelling, Irregular Heart Rate, Cyanosis, Dyspnea on     Exertion, Other      Gastrointestinal:  Denies: Nausea, Constipation, Diarrhea, Abdominal pain,     Vomiting, Difficulty Swallowing, Reflux/Heartburn, Dysphagia, Jaundice,     Bloating, Melena, Bloody stools, Other      Genitourinary:  Denies: Urinary frequency, Incontinence, Hematuria, Urgency,     Nocturia, Dysuria, Testicular problems, Other      Musculoskeletal:  Denies: Joint Pain, Joint Stiffness, Joint Swelling, Myalgias,    Other      Hematologic/lymphatic:  DENIES: Lymphadenopathy, Bruising, Bleeding tendencies,     Other      Neurological:  Denies: Headache, Numbness, Weakness, Seizures, Other      Psychiatric:  Denies: Anxiety, Appropriate Effect, Depression, Other      Sleep:  No: Excessive daytime sleep, Morning Headache?, Snoring, Insomnia?, Stop    breathing at sleep?, Other      Integumentary:  Denies: Rash, Dry skin, Skin Warm to Touch, Other      Immunologic/Allergic:  Denies: Latex allergy, Seasonal allergies, Asthma,     Urticaria, Eczema, Other      Immunization status:  No: Up to date            FAMILY/SOCIAL/MEDICAL  "HX      Surgical History:  Yes: Appendectomy (AGE 12YR), Bowel Surgery (COLONOSCOPY),     Kidney Surgery (Kidney stones), Oral Surgery (TEETH REMOVED); No: AAA Repair,     Abdominal Surgery, Angioplasty, Back Surgery, Bladder Surgery, Breast Surgery,     CABG, Carotid Stenosis, Cholecystectomy, Ear Surgery, Eye Surgery, Head Surgery,    Hernia Surgery, Nose Surgery, Orthopedic Surgery, Prostatectomy, Rectal Surgery,    Spinal Surgery, Testicular Surgery, Throat Surgery, Valve Replacement, Vascular     Surgery, Other Surgeries      Stroke - Family Hx:  Mother      Diabetes - Family Hx:  Brother      Cancer/Type - Family Hx:  Father      Other Family Medical History:  Mother      Is Father Still Living?:  No      Is Mother Still Living?:  Yes       Family History:  Yes      Social History:  No Tobacco Use, No Alcohol Use, No Recreational Drug use      Smoking status:  Former smoker (1 ppd x 25 years, quit 2015)      Anticoagulation Therapy:  No      Antibiotic Prophylaxis:  No      Medical History:  Yes: Arthritis, Blood Disease (\"POLYMYACITIS.\"), High Blood     Pressure (ON MEDS), Kidney Stones (\"19 YEARS AGO, AND PASSED ANOTHER ONE HERE IN    E.D. 2-3 YRS AGO.\"), Reflux Disease (ON MEDICATION); No: Alcoholism, Allergies,     Anemia, Asthma, Broken Bones, Cataracts, Chemical Dependency,     Chemotherapy/Cancer, Chronic Bronchitis/COPD, Emphysema, Chronic Liver Disease,     Colon Trouble, Colitis, Diverticulitis, Congestive Heart Failu, Deafness or     Ringing Ears, Convulsions, Depression, Anxiety, Bipolar Disorder, Diabetes,     Epilepsy, Seizures, Forgetfullness, Glaucoma, Gall Stones, Gout, Head Injury, H    eart Attack, Heart Murmur, Hemorrhoids/Rectal Prob, Hepatitis, Hiatal Hernia,     High Cholesterol, HIV (Do not ask - volu, Jaundice, Kidney or Bladder Disease,     Migrane Headaches, Mitral Valve Prolapse, Night sweats, Phlebitis, Psychiatric     Care, Rheumatic Fever, Sexually Transmitted Dis, Shortness Of " Breath, Sinus     Trouble, Skin Disease/Psoriais/Ecz, Stroke, Thyroid Problem, Tuberculosis or Pos    TB Te, Miscellaneous Medical/oth      Psychiatric History      none            PREVENTION      Hx Influenza Vaccination:  Yes      Date Influenza Vaccine Given:  Sep 1, 2018      Influenza Vaccine Declined:  No      2 or More Falls Past Year?:  No      Fall Past Year with Injury?:  No      Hx Pneumococcal Vaccination:  Yes      Encouraged to follow-up with:  PCP regarding preventative exams.      Chart initiated by      dean pan ma            ALLERGIES/MEDICATIONS      Allergies:        Coded Allergies:             SULFA (SULFONAMIDE ANTIBIOTICS) (Verified  Allergy, Unknown, 10/19/18)           VALDECOXIB (Verified  Allergy, Unknown, 10/19/18)      Medications    Last Reconciled on 10/19/18 12:33 by LOUIS GERONIMO MD      predniSONE* (predniSONE*) 20 Mg Tablet      40 MG PO QDAY, #10 TAB 3 Refills         Prov: Louis Geronimo         9/20/18       Omeprazole (Omeprazole*) 40 Mg Capsule      40 MG PO BID, #60 CAP 9 Refills         Prov: Louis Geronimo         2/28/18       Multivitamins (Multi-Vitamin) 1 Each Tablet      1 TAB PO QDAY, #30 TAB 0 Refills         Reported         2/12/18       Aspirin Chew (Aspirin Baby) 81 Mg Tab.chew      81 MG PO QDAY, #30 TAB.CHEW 0 Refills         Reported         2/12/18       Naproxen (Naprosyn EC) 375 Mg Tablet.dr      375 MG PO QDAY, TAB         Reported         1/9/18       Hydroxychloroquine Sulfate (Plaquenil*) 200 Mg Tab      200 MG PO QDAY, TAB         Reported         1/9/18       Lisinopril* (Lisinopril*) 20 Mg Tablet      20 MG PO HS, #30 TAB 0 Refills         Reported         1/9/18       predniSONE* (predniSONE*) 2.5 Mg Tablet      2.5 MG PO QDAY, TAB         Reported         1/9/18      Current Medications      Current Medications Reviewed 10/19/18            EXAM      CONSTITUTIONAL: Pleasant male in no acute distress, normal conversant.       EYES : Pink  conjunctive, no ptosis, PERRL.       ENMT : Nose and ears appear normal, normal dentition, mild posterior pharyngeal     wall erythema. Mallampati classification II, no sinus tenderness.       Neck: Nontender, no masses, no thyromegaly, no nodules.      Resp : Bilateral diminished breath sounds, resonant to percussion bilaterally,     scattered expiratory wheezing, no crackles or rhonchi.      CVS  : No carotid bruits, s1s2 nl, RRR, no murmur, rubs or gallop, no peripheral    edema       Chest wall: Normal rise with inspiration, nontender on palpation      GI   : Abdomen soft, with no masses, no hepatosplenomegaly, no hernias, BS+      MSK  : Normal gait and station, no digital cyanosis or clubbing       Skin : No rashes, ulcerations or lesions, normal turgor and temperature      Neuro: CN II - XII intact, no sensory deficits, DTRs intact and symmetrical, no     motor weakness      Psych: Appropriate affect, A   Vtials      Vitals:             Height 5 ft 8 in / 172.72 cm           Weight 262 lbs 6 oz / 119.819856 kg           BSA 2.29 m2           BMI 39.9 kg/m2           Temperature 98.3 F / 36.83 C - Oral           Pulse 73           Respirations 16           Blood Pressure 144/84 Sitting, Right Arm           Pulse Oximetry 94%, room air            REVIEW      Results Reviewed      PCCS Results Reviewed?:  Yes Prev Lab Results, Yes Prev Radiology Results, Yes     [facility]      [facility]adi[facility] Results               [facility] Diagnostic Img                PACS RADIOLOGY REPORT            Patient: FREDERICK KNOWLES JR   Acct: #[id number]   Report: #[id number]            UNIT #: [id number]    DOS: 18 1000      INSURANCE:[employer] - PPO   ORDER #:[id number]      LOCATION:[facility]     : 1965            PROVIDERS      ADMITTING:     ATTENDING: Louis Geronimo      FAMILY:  NAVEED ELIZABETH   ORDERING:  Louis Geronimo         OTHER: Tenisha Clayton    DICTATING:  JOSIE VAZQUEZ MD            REQ #:[id number]   EXAM:CHWO - CT CHEST without CONTRAST      REASON FOR EXAM:        REASON FOR VISIT:  COUGH            *******Signed******         PROCEDURE:   CT CHEST WITHOUT CONTRAST             COMPARISON:   [facility], PET, SKULL BASE TO MID THIGH INITIAL,     2/16/2018, 9:33.        Other, CT, CHEST W/O CONTRAST, 12/30/2016, 13:38.  [facility], CT,    CHEST W/O       CONTRAST, 1/25/2018, 9:42.             INDICATIONS:   COUGH, PAST SMOKER, BRONCHITIS.  Followup pulmonary nodules             TECHNIQUE:   CT images were created without the administration of contrast     material.               PROTOCOL:     Standard imaging protocol performed                RADIATION:     DLP: 700mGy*cm          Automated exposure control was utilized to minimize radiation dose.              FINDINGS:         Redemonstration of multifocal pleural-based nodularity, or predominately in the     lung bases and       greater on the right than the left.  All of these except 1 are unchanged in size    and appearance.        Pleural-based nodule right middle lobe (image 43) previously measured 2.0 cm.      This nodule       previously measured 1.8 cm in 2016. This nodule showed equivocal FDG uptake on     PET-CT from       2/16/2018, with a maximum SUV of 2.8.             Cardiomegaly.  Scattered small lymph nodes in the mediastinum are nonspecific.             No acute findings in the included upper abdomen.  2 cm benign left adrenal     adenoma.  Subtle 1.6 cm       low attenuation lesion at the dome of the liver unchanged dating back to 2016,     in keeping with a       benign finding.  No aggressive appearing bone lesion.             CONCLUSION:   No acute findings in the chest.             Multifocal pleural-based nodularity in the lung bases, overall very similar     dating back to at least       2016, suggestive of benign process.             1 nodule in the  right middle lobe is minimally larger and showed eco focal FDG     uptake on PET-CT.        Recommend attention to this nodule on six-month followup chest                JOSIE VAZQUEZ MD             Electronically Signed and Approved By: JOSIE VAZQUEZ MD on 8/28/2018 at 11:49                        Until signed, this is an unconfirmed preliminary report that may contain      errors and is subject to change.                                              PHILER:      D:08/28/18 1149      PFT Results      [id number]  [id number]                                 [facility]                          [employer]                            [place], [place]  [address]               __________________________________________________________________________             Patient Name:                   Attending Physician:      Braxton Mcghee Jr NAVIN KAINI, M.D.             Patient Visit # MR #            Admit Date  Disch Date     Location      [id number]    [id number]      01/25/2018                 CT- -             Date of Birth      1965      __________________________________________________________________________      0821 - DIAGNOSTIC REPORT             PULMONARY FUNCTION TEST             SPIROMETRY:      Spirometry shows possible severe restrictive defect.      FEV1/FVC ratio is 74%.      FEV1 is 1.27 L, 35% of predicted.      FVC is 1.71 L, 37% of predicted.             BRONCHODILATOR RESPONSE:      There is a significant response to bronchodilator administration.      FEV1 increased from 1.27 L to 1.50 L, 18% change.      FVC increased from 1.71 L to 1.98 L, 16% change.             LUNG VOLUMES:      Lung volumes confirmed restriction.      Total lung capacity is 3.33 L, 50% of predicted.      Residual volume is 1.52 L, 74% of predicted.             DIFFUSION:      Diffusion capacity is mildly decreased, 66% of predicted.             FLOW  VOLUME LOOP:      Flow volume loop compatible with restrictive process.             CONCLUSION:      Normal FEV1/FVC with low FEV1 and FVC, with low lung capacity and lung      volumes with low diffusion capacity.  It suggests severe restrictive airway      disease, likely mixture of body habitus and pulmonary parenchymal disease.      There is significant bronchial reversibility seen as well.  Please correlate      clinically.             To be electronically signed in [employer]      [id number] LOUIS GERONIMO M.D.             NK:rt      D:  02/08/2018 12:47      T:  02/08/2018 12:54      #[id number]             Until signed, this is an unconfirmed preliminary report that may contain      errors and is subject to change.                   02/12/18 1139  <Electronically signed by Louis Geronimo MD>            Assessment      Lung nodule - R91.1            Shortness of breath - R06.02            Notes      New Diagnostics      * Chest W/O Cont CT, 2 Months         Dx: Lung nodule - R91.1      * Chest W/ Cont CT, 2 Months         Dx: Lung nodule - R91.1      * Echo Complete, Week         Dx: Shortness of breath - R06.02      PLAN:      The patient is a 53 year old male with a history of smoking in the past, chronic    persistent cough with nodular opacities bilaterally concerning for chronic     indolent infection versus malignancy. He also had a lung nodule and connective     tissue disease possible inflammatory myositis or systemic lupus.               1. CT scan abnormality.  One of the lung nodules was mildly hypermetabolic with     SUV of 2.8 and it was slightly enlarging in size, so I had ordered transthoracic    biopsy, but patient is hesitant because of the side effects. I will repeat CT     scan of the chest in two months. I will also order an echocardiogram in two     months.  If his lung nodule is increasing in size, he will need biopsy at that     time.  It is slowly increasing and has not had significant growth over  time. He     has a history of pulmonary embolism in the past, so I will order the CT scan     with contrast to see if there is any significant issues with chronic pulmonary     embolism or pulmonary hypertension.            2.  Follow up in 2-3 months, earlier if needed.            3. He is up-to-date on vaccinations.            Patient Education      Education resources provided:  Yes      Patient Education Provided:  Acute Bronchitis                 Disclaimer: Converted document may not contain table formatting or lab diagrams. Please see [employer] for the authenticated document.

## 2021-05-28 NOTE — PROGRESS NOTES
Patient: FREDERICK KNOWLES JR     Acct: [id number]     Report: #[id number]  UNIT #: [id number]     : 1965    Encounter Date:2018  PRIMARY CARE: NAVEED ELIZABETH  ***Signed***  --------------------------------------------------------------------------------------------------------------------  Chief Complaint      Encounter Date      2018            Referring Provider      Tenisha Clayton            Patient Complaint      Patient is complaining of      2 week follow up            VITALS      Height 5 ft 8 in / 172.72 cm      Weight 254 lbs 5 oz / 115.221692 kg      BSA 2.40 m2      BMI 38.7 kg/m2      Temperature 98.2 F / 36.78 C - Oral      Pulse 83      Respirations 16      Blood Pressure 150/84 Sitting, Right Arm      Pulse Oximetry 92%, room air      Exhaled Nitrous Oxide Testin            HPI      The patient is a 53 year old male with a history of smoking in the past,     persistent cough with nodular opacities bilaterally concerning for chronic     indolent infection versus malignancy.  He is here status post bronchoscopy.      The patient had bronchoscopy done on 2018. Bronchoscopy showed friable     mucosa and irritable mucosal growth in the right hilar area and floppy     epiglottis. The patient did not have any significant problems after bronchoscopy    , but over the last week or so in his company everyone was getting strep throat     and pneumonia. He started having cough and some shortness of breath.  He has     been having worsening cough every since. He has no fever or chills.  No nausea     or vomiting.  He is producing some thick phlegm. So far bronchoscopic cultures     have been negative.  He has not been coughing up any blood.  He is not using     any inhalers at this time.  I repeated the pulmonary function test with him     again today.            ROS      Constitutional:  Complains of: Fatigue, Denies: Fever, Weight gain, Weight loss    , Chills, Insomnia,  Other      Respiratory/Breathing:  Complains of: Shortness of air, Wheezing, Cough, Denies    : Hemoptysis, Pleuritic pain, Other      Endocrine:  Denies: Polydipsia, Polyuria, Heat/cold intolerance, Diabetes, Other      Eyes:  Denies: Blurred vision, Vision Changes, Other      Ears, nose, mouth, throat:  Denies: Mouth lesions, Thrush, Throat pain,     Hoarseness, Allergies/Hay Fever, Post Nasal Drip, Headaches, Recent Head Injury    , Nose Bleeding, Neck Stiffness, Thyroid Mass, Hearing Loss, Ear Fullness, Dry     Mouth, Nasal or Sinus Pain, Dry Lips, Nasal discharge, Nasal congestion, Other      Cardiovascular:  Denies: Palpitations, Syncope, Claudication, Chest Pain, Wake     up Gasping for air, Leg Swelling, Irregular Heart Rate, Cyanosis, Dyspnea on     Exertion, Other      Gastrointestinal:  Denies: Nausea, Constipation, Diarrhea, Abdominal pain,     Vomiting, Difficulty Swallowing, Reflux/Heartburn, Dysphagia, Jaundice, Bloating    , Melena, Bloody stools, Other      Genitourinary:  Denies: Urinary frequency, Incontinence, Hematuria, Urgency,     Nocturia, Dysuria, Testicular problems, Other      Musculoskeletal:  Denies: Joint Pain, Joint Stiffness, Joint Swelling, Myalgias    , Other      Hematologic/lymphatic:  DENIES: Lymphadenopathy, Bruising, Bleeding tendencies,     Other      Neurological:  Denies: Headache, Numbness, Weakness, Seizures, Other      Psychiatric:  Denies: Anxiety, Appropriate Effect, Depression, Other      Sleep:  No: Excessive daytime sleep, Morning Headache?, Snoring, Insomnia?,     Stop breathing at sleep?, Other      Integumentary:  Denies: Rash, Dry skin, Skin Warm to Touch, Other      Immunologic/Allergic:  Denies: Latex allergy, Seasonal allergies, Asthma,     Urticaria, Eczema, Other      Immunization status:  No: Up to date            FAMILY/SOCIAL/MEDICAL HX      Surgical History:  Yes: Appendectomy (AGE 12YR), Bowel Surgery (COLONOSCOPY),     Kidney Surgery (Kidney stones),  "Oral Surgery (TEETH REMOVED), No: AAA Repair,     Abdominal Surgery, Angioplasty, Back Surgery, Bladder Surgery, Breast Surgery,     CABG, Carotid Stenosis, Cholecystectomy, Ear Surgery, Eye Surgery, Head Surgery    , Hernia Surgery, Nose Surgery, Orthopedic Surgery, Prostatectomy, Rectal     Surgery, Spinal Surgery, Testicular Surgery, Throat Surgery, Valve Replacement,     Vascular Surgery, Other Surgeries      Stroke - Family Hx:  Mother      Diabetes - Family Hx:  Brother      Cancer/Type - Family Hx:  Father      Other Family Medical History:  Mother      Is Father Still Living?:  No      Is Mother Still Living?:  Yes      Social History:  No Tobacco Use, No Alcohol Use, No Recreational Drug use      Smoking status:  Former smoker (1 ppd x 25 years, quit 2015)      Anticoagulation Therapy:  No      Antibiotic Prophylaxis:  No      Medical History:  Yes: Arthritis, Blood Disease (\"POLYMYACITIS.\"), High Blood     Pressure (ON MEDS), Kidney Stones (\"19 YEARS AGO, AND PASSED ANOTHER ONE HERE     IN E.D. 2-3 YRS AGO.\"), Reflux Disease (ON MEDICATION), No: Alcoholism,     Allergies, Anemia, Asthma, Broken Bones, Cataracts, Chemical Dependency,     Chemotherapy/Cancer, Chronic Bronchitis/COPD, Emphysema, Chronic Liver Disease,     Colon Trouble, Colitis, Diverticulitis, Congestive Heart Failu, Deafness or     Ringing Ears, Convulsions, Depression, Anxiety, Bipolar Disorder, Diabetes,     Epilepsy, Seizures, Forgetfullness, Glaucoma, Gall Stones, Gout, Head Injury,     Heart Attack, Heart Murmur, Hemorrhoids/Rectal Prob, Hepatitis, Hiatal Hernia,     High Cholesterol, HIV (Do not ask - volu, Jaundice, Kidney or Bladder Disease,     Migrane Headaches, Mitral Valve Prolapse, Night sweats, Phlebitis, Psychiatric     Care, Rheumatic Fever, Sexually Transmitted Dis, Shortness Of Breath, Sinus     Trouble, Skin Disease/Psoriais/Ecz, Stroke, Thyroid Problem, Tuberculosis or     Pos TB Te, Miscellaneous Medical/oth    "   Psychiatric History      -            Hx Influenza Vaccination:  No      Influenza Vaccine Declined:  Yes      2 or More Falls Past Year?:  No      Fall Past Year with Injury?:  No      Hx Pneumococcal Vaccination:  No      Encouraged to follow-up with:  PCP regarding preventative exams.      Chart initiated by      dean pan ma            ALLERGIES/MEDICATIONS      Allergies:        Coded Allergies:             SULFA (SULFONAMIDE ANTIBIOTICS) (Verified  Allergy, Unknown, 2/28/18)           VALDECOXIB (Verified  Allergy, Unknown, 2/28/18)      Medications    Last Reconciled on 2/28/18 12:30 by LOUIS GERONIMO MD      predniSONE* (predniSONE*) 20 Mg Tablet      40 MG PO QDAY, #10 TAB 0 Refills         Prov: Louis Geronimo         2/28/18       Levofloxacin (Levofloxacin) 750 Mg Tablet      750 MG PO QDAY, #10 TAB         Prov: Louis Geronimo         2/28/18       Omeprazole (Omeprazole*) 40 Mg Capsule      40 MG PO BID, #60 CAP 9 Refills         Prov: Louis Geronimo         2/28/18       Budesonide/Formoterol Fumarate (Symbicort 160/4.5 Mcg) 10.2 Gm Inh      2 PUFF INH BID, #1 INH 9 Refills         Prov: Louis Geronimo         2/28/18       Azelastine Hcl (Azelastine Nasal*) 137 Mcg/0.137 Ml Spray.pump      2 PUFFS NARE EACH BID, #1 BOTTLE 9 Refills         Prov: Louis Geronimo         2/28/18       Multivitamins (Multi-Vitamin) 1 Each Tablet      1 TAB PO QDAY, #30 TAB 0 Refills         Reported         2/12/18       Aspirin (Aspirin Baby *) 81 Mg Tab.chew      81 MG PO QDAY, #30 TAB.CHEW 0 Refills         Reported         2/12/18       Naproxen (Naprosyn EC) 375 Mg Tablet.      375 MG PO QDAY, TAB         Reported         1/9/18       Hydroxychloroquine Sulfate (Plaquenil*) 200 Mg Tab      200 MG PO QDAY, TAB         Reported         1/9/18       Lisinopril* (Lisinopril*) 20 Mg Tablet      20 MG PO HS, #30 TAB 0 Refills         Reported         1/9/18       predniSONE* (predniSONE*) 2.5 Mg Tablet      2.5 MG PO QDAY, TAB          Reported         1/9/18      Current Medications      Current Medications Reviewed 2/28/18            EXAM      CONSTITUTIONAL: Pleasant male in no acute distress, normal conversant.       EYES : Pink conjunctive, no ptosis, PERRL.       ENMT : Nose and ears appear normal, normal dentition, mild posterior pharyngeal     wall erythema. Mallampati classification II, no sinus tenderness.       Neck: Nontender, no masses, no thyromegaly, no nodules.      Resp : Bilateral diminished breath sounds, resonant to percussion bilaterally,     scattered expiratory wheezing, no crackles or rhonchi.      CVS  : No carotid bruits, s1s2 nl, RRR, no murmur, rubs or gallop, no     peripheral edema       Chest wall: Normal rise with inspiration, nontender on palpation      GI   : Abdomen soft, with no masses, no hepatosplenomegaly, no hernias, BS+      MSK  : Normal gait and station, no digital cyanosis or clubbing       Skin : No rashes, ulcerations or lesions, normal turgor and temperature      Neuro: CN II - XII intact, no sensory deficits, DTRs intact and symmetrical, no     motor weakness      Psych: Appropriate affect, A   Vtials      Vitals:             Height 5 ft 8 in / 172.72 cm           Weight 254 lbs 5 oz / 115.317536 kg           BSA 2.40 m2           BMI 38.7 kg/m2           Temperature 98.2 F / 36.78 C - Oral           Pulse 83           Respirations 16           Blood Pressure 150/84 Sitting, Right Arm           Pulse Oximetry 92%, room air            REVIEW      Results Reviewed      PCCS Results Reviewed?:  Yes Prev Lab Results, Yes Prev Radiology Results, Yes     Previous Mecial Records      Lab Results      Bronchoscopy cultures so far have been negative.      Radiographic Results               [facility]                PACS RADIOLOGY REPORT            Patient: FREDERICK KNOWLES JR   Acct: #[id number]   Report: #[id number]            UNIT #:  [id number]    DOS: 18 0940      INSURANCE:[email or web address] [facility] - [facility]   ORDER #:[id number]      LOCATION:CT     : 1965            PROVIDERS      ADMITTING:     ATTENDING: Louis Geronimo      FAMILY:  NAVEED ELIZABETH   ORDERING:  Louis Geronimo         OTHER:    DICTATING:  Evin August MD, JR            REQ #:[id number]   EXAM:WO - CT CHEST without CONTRAST      REASON FOR EXAM:  R05      REASON FOR VISIT:  R05            *******Signed******         PROCEDURE:   CT CHEST WITHOUT CONTRAST             COMPARISONS:   [facility], CT, CHEST W CONTRAST R/O PE, 3/28/2008    , 14:10.  [facility], CR, CHEST PA/AP      Other, [place], [place], CT, CHEST W/O CONTRAST, , 13:38.             INDICATIONS:   R05/ COUGH; OTHER NONSPECIFIC ABNORMAL FINDINGS OF LUNG FIELD.             TECHNIQUE:   Multiplanar CT images were created without the administration of     contrast material.               PROTOCOL:     Standard imaging protocol performed                RADIATION:     DLP: 473mGy*cm          Automated exposure control was utilized to minimize radiation dose.              FINDINGS:   There is a subpleural nodule in the inferior, posterior, lateral     right middle lobe, which       measures 1.9 x 1.8 cm on image 40 of series 3. Previously, it measured 1.9 x     1.4 cm on image 38 of       series 4 from the outside study, dated 2016. It is noncalcified and is non    -cavitary. It       measures approximately 2.3 cm in craniocaudal extent on the current study.     Previously, it measured       approximately 2.4 cm in craniocaudal extent. The finding is thought not to be     significantly       changed. It may represent pleural-parenchymal scarring. Round atelectasis is     possible. A benign or       malignant nodule of pleural origin cannot be excluded; the finding is new since     the prior study       from 2008. A post  infectious/inflammatory nodule is also possible. There     are several other       similar but smaller findings, especially on the right. The largest pleural-    based nodule (or       pleuroparenchymal density) on the left is probably seen in the medial, inferior    , posterior left       thorax, as seen on image 48 of series 2. It measures approximately 3.1 cm in     greatest axial extent       on the current study. Previously, it measured 3.2 cm in greatest axial extent.     On coronal       reformations, the finding is estimated at about 3.6 cm in greatest craniocaudal     extent currently.       Previously, it measured about 4.9 cm. Again, it may represent a pleural-based     nodule, such as       related to prior asbestos exposure and/or a benign or malignant tumor. Round     atelectasis is       possible. Pleural-parenchymal fibrotic change would be in the differential     diagnosis. This finding       is thought not to be significantly changed since the prior exam from 12/30/2016    , as well. Please       correlate clinically. For instance, does the patient have a history of asbestos     exposure?       Pleural-based metastatic disease or mesothelioma are possible but thought to be     less likely,       considering the lack of significant interval change since the prior outside     chest CT study from       12/03/2016.              No definite acute infiltrate is seen. There is mild subsegmental atelectasis and    /or fibrosis in the       lung bases. There is borderline cardiac enlargement. There is mild thickening     of the pericardium       with a maximum thickness estimated at 8 mm, near the left anterior cardiac     margin, as seen on image       40 of series 2. This finding is stable since the prior study. There are several     small mediastinal       lymph nodes. There may be residual thymic tissue. These findings were seen     previously. No       aneurysmal enlargement of the thoracic aorta  suspected. Coronary artery     calcifications are seen.       There is a tiny hiatal hernia. There is thought to be a stable low attenuation     structure in the       posterior upper pole of the right kidney, measuring 2.3 cm, with a CT number     near 0 to -1       Hounsfield units. It probably represents a benign renal cyst. No right adrenal     mass is seen. There       is a stable left adrenal mass, measuring approximately 2.1 cm, with a CT number     of approximately       -10 Hounsfield units. It probably represents a benign adenoma. Consider imaging     follow-up, such as       with a Nuclear Medicine (NM) whole-body PET/CT scan, especially to further     evaluate the       pleural-based nodules (if not already performed). No pneumothorax is seen.             CONCLUSION:                1. Probably no acute findings are present. No acute infiltrate is suggested.              2. No pleural effusion is suggested.              3. There may be borderline cardiac enlargement.             4. Probably no significant interval change is seen since the prior outside CT     study from       12/30/2016, allowing for differences in CT scanners, slice thickness, and scan     angle. Again       demonstrated are several nodular pleural-based opacities throughout the lungs,     probably greater in       size and number on the right. These findings are nonspecific. They may be     related to prior asbestos       exposure. Consider a Nuclear Medicine (NM) whole-body PET-CT study for further     imaging assessment       if clinically warranted and if not already performed.              5. Please see above comments for further detail.             ALDEN NORTON JR, MD             Electronically Signed and Approved By: ALDEN NORTON JR, MD on 1/26/2018 at 3:    26                        Until signed, this is an unconfirmed preliminary report that may contain      errors and is subject to change.                                               LUPIS:      D:01/26/18 0116      PFT Results      [id number]  [id number]                                 [facility]                          [facility] [employer] [facility]                            [place]  [id number]               __________________________________________________________________________             Patient Name:                   Attending Physician:      Braxton Mcghee Jr NAVIN KAINI, M.D.             Patient Visit # MR #            Admit Date  Disch Date     Location      [id number]    [id number]      01/25/2018                 CT- -             Date of Birth      1965      __________________________________________________________________________      0821 - DIAGNOSTIC REPORT             PULMONARY FUNCTION TEST             SPIROMETRY:      Spirometry shows possible severe restrictive defect.      FEV1/FVC ratio is 74%.      FEV1 is 1.27 L, 35% of predicted.      FVC is 1.71 L, 37% of predicted.             BRONCHODILATOR RESPONSE:      There is a significant response to bronchodilator administration.      FEV1 increased from 1.27 L to 1.50 L, 18% change.      FVC increased from 1.71 L to 1.98 L, 16% change.             LUNG VOLUMES:      Lung volumes confirmed restriction.      Total lung capacity is 3.33 L, 50% of predicted.      Residual volume is 1.52 L, 74% of predicted.             DIFFUSION:      Diffusion capacity is mildly decreased, 66% of predicted.             FLOW VOLUME LOOP:      Flow volume loop compatible with restrictive process.             CONCLUSION:      Normal FEV1/FVC with low FEV1 and FVC, with low lung capacity and lung      volumes with low diffusion capacity.  It suggests severe restrictive airway      disease, likely mixture of body habitus and pulmonary parenchymal disease.      There is significant bronchial reversibility seen as well.  Please correlate      clinically.              To be electronically signed in [employer]      [id number] LOUIS GERONIMO M.D.             NK:rt      D:  02/08/2018 12:47      T:  02/08/2018 12:54      #[id number]             Until signed, this is an unconfirmed preliminary report that may contain      errors and is subject to change.                   02/12/18 1637  <Electronically signed by Louis Geronimo MD>            PLAN      Assessment      Bronchitis - J40            Notes      New Medications      * AZELASTINE HCL (Azelastine Nasal*) 137 MCG/0.137 ML SPRAY.PUMP: 2 PUFFS NARE     EACH BID #1      * Budesonide/Formoterol Fumarate (Symbicort 160/4.5 Mcg) 10.2 GM INH: 2 PUFF     INH BID #1      * Omeprazole (Omeprazole*) 40 MG CAPSULE: 40 MG PO BID #60      * Levofloxacin 750 MG TABLET: 750 MG PO QDAY #10      * predniSONE* 20 MG TABLET: 40 MG PO QDAY #10      New Diagnostics      * Chest W/O Cont CT, SCHEDULED PROCEDURE       Dx: Bronchitis - J40      PLAN:      The patient is a 53 year old male with a history of smoking in the past,     chronic persistent cough with nodular opacities bilaterally concerning for     chronic indolent infection versus malignancy.              1. CT scan abnormality.  PET scan was negative.  I will repeat CT scan of the     chest in six months.  So far bronchoscopy cultures are negative.  I will follow     up the bronch cultures for any atypical or fungal infection over the next 4-6     weeks.  I will start him on Symbicort 2 puffs twice a day. I will start him on     omeprazole 40 mg twice a day.  I have given him a course of antibiotics,     Levofloxacin x10 days. I have also given him prednisone x10 days.              2.  Postnasal drip. I will start him on Azelastine nasal spray two puffs in     each nares twice a day. He has significant issues early in the morning with     cough and increased secretions.            3. I have ordered CT scan of the chest in six months.            4.  I will follow up in six months earlier if needed. If  his bronchoscopy     cultures come back positive, I will start him on treatment prior to his next     office visit.            Patient Education      Education resources provided:  Yes      Patient Education Provided:  Acute Bronchitis            Patient Education:        Bronchitis (Adult)                 Disclaimer: Converted document may not contain table formatting or lab diagrams. Please see [employer] for the authenticated document.

## 2021-05-28 NOTE — PROGRESS NOTES
Patient: FREDERICK KNOWLES JR     Acct: [id number]     Report: #[id number]  UNIT #: [id number]     : 1965    Encounter Date:2019  PRIMARY CARE: NAVEED ELIZABETH  ***Signed***  --------------------------------------------------------------------------------------------------------------------  Chief Complaint      Encounter Date      2019            Primary Care Provider      Tenisha Clayton            Referring Provider      Tenisha Clayton            Patient Complaint      Patient is complaining of      ct results/follow up            VITALS      Height 5 ft 8.00 in / 172.72 cm      Weight 252 lbs  / 114.704224 kg      BSA 2.25 m2      BMI 38.3 kg/m2      Temperature 98.1 F / 36.72 C - Oral      Pulse 85      Respirations 16      Blood Pressure 142/98 Sitting, Right Arm      Pulse Oximetry 92%, nasal cannula , 2 lpm      Comment: patient o2 was 87% with ambulation,went up to 92% with 2 liters of o2      Initial Exhaled Nitrous Oxide      Exhaled Nitrous Oxide Results:  11            HPI      The patient is a 54 year old male with a history of smoking in the past, chronic    persistent cough with nodular opacities bilaterally concerning for chronic     indolent infection versus malignancy. He had lung nodule and connective tissue     disease, possibly inflammatory myositis or systemic lupus.  He is here for     follow up. Since his last office visit, he had repeat CT scan of the chest done     on 06/10/2019 which was reviewed with him today.  He has no fever, chills,     nausea or vomiting.  He does complain about a significant problem with sleep. He    goes to be around 9:30 to 10:00 p.m., gets out around 4 a.m.  He has multiple     awakenings at night. He does snore and stop breathing at times. He does feel     sleepy during the daytime.  His [place] sleepiness score was 18/24. I had given     him Qvar last time and it did help him.  Whenever it takes prednisone it does     help.  He feels  like Qvar makes him jittery at times.  He has no changes in     weight or appetite, no coughing up blood, nausea or vomiting.  Overall normal     and no change in his symptoms.            ROS      Constitutional:  Denies: Fatigue, Fever, Weight gain, Weight loss, Chills,     Insomnia, Other      Respiratory/Breathing:  Denies: Shortness of air, Wheezing, Cough, Hemoptysis,     Pleuritic pain, Other      Endocrine:  Denies: Polydipsia, Polyuria, Heat/cold intolerance, Diabetes, Other      Eyes:  Denies: Blurred vision, Vision Changes, Other      Ears, nose, mouth, throat:  Denies: Mouth lesions, Thrush, Throat pain,     Hoarseness, Allergies/Hay Fever, Post Nasal Drip, Headaches, Recent Head Injury,    Nose Bleeding, Neck Stiffness, Thyroid Mass, Hearing Loss, Ear Fullness, Dry     Mouth, Nasal or Sinus Pain, Dry Lips, Nasal discharge, Nasal congestion, Other      Cardiovascular:  Denies: Palpitations, Syncope, Claudication, Chest Pain, Wake     up Gasping for air, Leg Swelling, Irregular Heart Rate, Cyanosis, Dyspnea on     Exertion, Other      Gastrointestinal:  Denies: Nausea, Constipation, Diarrhea, Abdominal pain,     Vomiting, Difficulty Swallowing, Reflux/Heartburn, Dysphagia, Jaundice,     Bloating, Melena, Bloody stools, Other      Genitourinary:  Denies: Urinary frequency, Incontinence, Hematuria, Urgency,     Nocturia, Dysuria, Testicular problems, Other      Musculoskeletal:  Denies: Joint Pain, Joint Stiffness, Joint Swelling, Myalgias,    Other      Hematologic/lymphatic:  DENIES: Lymphadenopathy, Bruising, Bleeding tendencies,     Other      Neurological:  Denies: Headache, Numbness, Weakness, Seizures, Other      Psychiatric:  Denies: Anxiety, Appropriate Effect, Depression, Other      Sleep:  No: Excessive daytime sleep, Morning Headache?, Snoring, Insomnia?, Stop    breathing at sleep?, Other      Integumentary:  Denies: Rash, Dry skin, Skin Warm to Touch, Other      Immunologic/Allergic:  Denies:  "Latex allergy, Seasonal allergies, Asthma,     Urticaria, Eczema, Other      Immunization status:  No: Up to date            FAMILY/SOCIAL/MEDICAL HX      Surgical History:  Yes: Appendectomy (AGE 12YR), Bowel Surgery (COLONOSCOPY),     Kidney Surgery (Kidney stones), Oral Surgery (TEETH REMOVED); No: AAA Repair,     Abdominal Surgery, Angioplasty, Back Surgery, Bladder Surgery, Breast Surgery,     CABG, Carotid Stenosis, Cholecystectomy, Ear Surgery, Eye Surgery, Head Surgery,    Hernia Surgery, Nose Surgery, Orthopedic Surgery, Prostatectomy, Rectal Surgery,    Spinal Surgery, Testicular Surgery, Throat Surgery, Valve Replacement, Vascular     Surgery, Other Surgeries      Stroke - Family Hx:  Mother      Diabetes - Family Hx:  Brother      Cancer/Type - Family Hx:  Father      Other Family Medical History:  Mother      Is Father Still Living?:  No      Is Mother Still Living?:  Yes       Family History:  Yes      Social History:  No Tobacco Use, No Alcohol Use, No Recreational Drug use      Smoking status:  Former smoker (1 ppd x 25 years, quit 2015)      Anticoagulation Therapy:  No      Antibiotic Prophylaxis:  No      Medical History:  Yes: Arthritis, Blood Disease (\"POLYMYACITIS.\"), High Blood     Pressure (ON MEDS), Kidney Stones (\"19 YEARS AGO, AND PASSED ANOTHER ONE HERE IN    E.D. 2-3 YRS AGO.\"), Reflux Disease (ON MEDICATION); No: Alcoholism, Allergies,     Anemia, Asthma, Broken Bones, Cataracts, Chemical Dependency,     Chemotherapy/Cancer, Chronic Bronchitis/COPD, Emphysema, Chronic Liver Disease,     Colon Trouble, Colitis, Diverticulitis, Congestive Heart Failu, Deafness or     Ringing Ears, Convulsions, Depression, Anxiety, Bipolar Disorder, Diabetes,     Epilepsy, Seizures, Forgetfullness, Glaucoma, Gall Stones, Gout, Head Injury,     Heart Attack, Heart Murmur, Hemorrhoids/Rectal Prob, Hepatitis, Hiatal Hernia,     High Cholesterol, HIV (Do not ask - volu, Jaundice, Kidney or Bladder Disease,   "   [place] Headaches, Mitral Valve Prolapse, Night sweats, Phlebitis, Psychiatric     Care, Rheumatic Fever, Sexually Transmitted Dis, Shortness Of Breath, Sinus     Trouble, Skin Disease/Psoriais/Ecz, Stroke, Thyroid Problem, Tuberculosis or Pos    TB Te, Miscellaneous Medical/oth      Psychiatric History      none            PREVENTION      Hx Influenza Vaccination:  Yes      Date Influenza Vaccine Given:  Sep 1, 2018      Influenza Vaccine Declined:  No      2 or More Falls Past Year?:  No      Fall Past Year with Injury?:  No      Hx Pneumococcal Vaccination:  Yes      Encouraged to follow-up with:  PCP regarding preventative exams.      Chart initiated by      dean pan ma            ALLERGIES/MEDICATIONS      Allergies:        Coded Allergies:             SULFA (SULFONAMIDE ANTIBIOTICS) (Verified  Allergy, Unknown, 6/26/19)           VALDECOXIB (Verified  Allergy, Unknown, 6/26/19)      Medications    Last Reconciled on 6/26/19 11:17 by LOUIS GERONIMO MD      Omeprazole (Omeprazole*) 40 Mg Capsule      40 MG PO BID, #60 CAP 9 Refills         Prov: Louis Geronimo         2/28/18       Multivitamins (Multi-Vitamin) 1 Each Tablet      1 TAB PO QDAY, #30 TAB 0 Refills         Reported         2/12/18       Aspirin Chew (Aspirin Baby) 81 Mg Tab.chew      81 MG PO QDAY, #30 TAB.CHEW 0 Refills         Reported         2/12/18       Naproxen (Naprosyn EC) 375 Mg Tablet.dr      375 MG PO QDAY, TAB         Reported         1/9/18       Hydroxychloroquine Sulfate (Plaquenil*) 200 Mg Tab      200 MG PO QDAY, TAB         Reported         1/9/18       Lisinopril* (Lisinopril*) 20 Mg Tablet      20 MG PO HS, #30 TAB 0 Refills         Reported         1/9/18       predniSONE* (predniSONE*) 2.5 Mg Tablet      2.5 MG PO QDAY, TAB         Reported         1/9/18      Current Medications      Current Medications Reviewed 6/26/19            EXAM      CONSTITUTIONAL: Pleasant male in no acute distress, normal conversant.        EYES : Pink conjunctive, no ptosis, PERRL.       ENMT : Nose and ears appear normal, normal dentition, mild posterior pharyngeal     wall erythema. Mallampati classification II, no sinus tenderness.       Neck: Nontender, no masses, no thyromegaly, no nodules.      Resp : Bilateral diminished breath sounds, resonant to percussion bilaterally,     scattered expiratory wheezing, no crackles or rhonchi.      CVS  : No carotid bruits, s1s2 nl, RRR, no murmur, rubs or gallop, no peripheral    edema       Chest wall: Normal rise with inspiration, nontender on palpation      GI   : Abdomen soft, with no masses, no hepatosplenomegaly, no hernias, BS+      MSK  : Normal gait and station, no digital cyanosis or clubbing       Skin : No rashes, ulcerations or lesions, normal turgor and temperature      Neuro: CN II - XII intact, no sensory deficits, DTRs intact and symmetrical, no     motor weakness      Psych: Appropriate affect, A   Vtials      Vitals:             Height 5 ft 8.00 in / 172.72 cm           Weight 252 lbs  / 114.285223 kg           BSA 2.25 m2           BMI 38.3 kg/m2           Temperature 98.1 F / 36.72 C - Oral           Pulse 85           Respirations 16           Blood Pressure 142/98 Sitting, Right Arm           Pulse Oximetry 92%, nasal cannula , 2 lpm           Comment: patient o2 was 87% with ambulation,went up to 92% with 2 liters of    o2            REVIEW      Results Reviewed      PCCS Results Reviewed?:  Yes Prev Lab Results, Yes Prev Radiology Results, Yes     [facility]      Lab Results      The patient's [place] sleepiness score was 18/24 today.      Radiographic Results               [facility] [place]                PACS RADIOLOGY REPORT            Patient: FREDERICK KNOWLES JR   Acct: #[id number]   Report: #[id number]            UNIT #: [id number]    DOS: 06/10/19 1200      INSURANCE:[employer] - [employer]   ORDER #:[id number]  [id number]      LOCATION:[facility]     : 1965            PROVIDERS      ADMITTING:     ATTENDING: Tenisha Clayton      FAMILY:  Louis Geronimo   ORDERING:  Louis Geronimo         OTHER:    DICTATING:  Taz Mae MD            REQ #:[id number]   EXAM:WO - CT CHEST without CONTRAST      REASON FOR EXAM:        REASON FOR VISIT:  SOLITARY PULMONARY NODULE/KNEE PAIN            *******Signed******         PROCEDURE:   CT CHEST WITHOUT CONTRAST             COMPARISON:   [facility], CT, CHEST W/O CONTRAST, 2018,     13:35.             INDICATIONS:   FOLLOW UP LUNG NODULE. SOA AT TIMES.             TECHNIQUE:   CT images were created without the administration of contrast     material.               PROTOCOL:     Standard imaging protocol performed                RADIATION:     DLP: 553.2mGy*cm          Automated exposure control was utilized to minimize radiation dose.              FINDINGS:         LUNGS:   1.3 cm right middle lobe pulmonary nodule is unchanged.  Pleural-based     densities in the       right middle lobe measuring up to 2.1 cm appear unchanged.  Nodules in the     medial basilar areas,       which appear pleural-based also are unchanged in the right lower and left lower     lobes.  Tiny 3 mm       nodule in the left upper lobe remains unchanged.        VASCULATURE:   Normal.  Thrombus cannot be excluded without intravenous     contrast.        SHIMA:   Normal.  No mass or adenopathy.        MEDIASTINUM:   Scattered nonenlarged mediastinal and axillary lymph nodes are     stable.      CARDIAC:   Trace pericardial effusion      AORTA:   Normal.  No aneurysm.        CHEST WALL:   Normal.  No mass or axillary adenopathy.        LIMITED ABDOMEN:   Stable left adrenal nodule, likely representing an adenoma.      The liver is       unchanged in appearance.  Nonobstructing right intrarenal calculi.  Simple right    renal cyst.      BONES:   Normal.  No bony lesion or fracture.        OTHER:    Negative.               CONCLUSION:         1. Stable bilateral pulmonary nodules as described above.  I favor these to     represent a prior       infectious or inflammatory process.  An additional six-month follow-up would     document 2 year       stability.              BENSON ESCOBAR MD             Electronically Signed and Approved By: BENSON ESCOBAR MD on 6/10/2019 at 11:51                        Until signed, this is an unconfirmed preliminary report that may contain      errors and is subject to change.                                              DUNS1:      D:06/10/19 1151            Assessment      Abnormal CT scan of lung - R91.8            Lung nodule - R91.1            DASIA (obstructive sleep apnea) - G47.33            Notes      New Medications      * predniSONE* (Deltasone*) 10 MG TABLET: 10 MG PO QDAY #120      New Diagnostics      * Chest W/O Cont CT, 6 Months         Dx: Abnormal CT scan of lung - R91.8      * Basic Sleep Study, Week         Dx: DASIA (obstructive sleep apnea) - G47.33      PLAN:      The patient is a 54 year old male with a history of smoking in the past, chronic    persistent cough with nodular opacities bilaterally concerning for chronic     indolent infection versus malignancy. He has lung nodule and connective tissue     disease with possible inflammatory myositis and systemic lupus.               1. CT scan abnormality.  One of the lung nodules was hypermetabolic at 2.8 and     it was slightly enlarging.  Repeat CT scan was stable.  I will repeat CT scan of    the chest in six months.              2.  Obstructive airway disease with reversibility.  He did like Qvar, but had     some jitteriness with it.  I will switch it to Arnuity 100 mcg once daily.  I     advised him to use prednisone burst at times if he starts having more cough or     shortness of breath.            3. Obstructive sleep apnea.  Sleep hygiene was discussed in detail. Weight loss     counseling was done.  I  advised him to keep his total sleep hours to more than     7.5 to 8 hours.  I will start him on Ambien for insomnia. I will check sleep     study.            4. Follow up in 3-4 months, earlier if needed. CT scan is ordered for six     months.            Patient Education      Education resources provided:  Yes      Patient Education Provided:  Acute Bronchitis, Sleep Apnea                 Disclaimer: Converted document may not contain table formatting or lab diagrams. Please see [employer] for the authenticated document.

## 2021-05-28 NOTE — PROGRESS NOTES
Patient: FREDERICK KNOWLES JR     Acct: [id number]     Report: #[id number]  UNIT #: [id number]     : 1965    Encounter Date:2019  PRIMARY CARE: JOSE ELIZABETH  ***Signed***  --------------------------------------------------------------------------------------------------------------------  Chief Complaint      Encounter Date      Dec 31, 2019            Primary Care Provider            Jose CARRASCO            Referring Provider      Tenisha Clayton            Patient Complaint      Patient is complaining of      ct follow up/soa            VITALS      Height 5 ft 8 in / 172.72 cm      Weight 257 lbs 1 oz / 116.892206 kg      BSA 2.27 m2      BMI 39.1 kg/m2      Temperature 98.2 F / 36.78 C - Oral      Pulse 95      Respirations 16      Blood Pressure 152/92 Sitting, Right Arm      Pulse Oximetry 92%, room air      Initial Exhaled Nitrous Oxide      Exhaled Nitrous Oxide Results:  11            HPI      The patient is a 54 year old male with history of pulmonary hypertension     secondary to left heart disease with recent left and right heart     catheterizations, severe chronic obstructive pulmonary disease and obesity     hyperventilation syndrome on nightly BiPAP, lung nodules, systemic lupus,     inflammatory myositis, history of right heart failure as well as morbid obesity     with BMI 36.1.  He is here for follow up today.             Since his last office visit he was seen by SERAFIN Prabhakar and his left and     right heart catheterization report was not available. I reviewed the results     with him today, it showed his PA pressure with a mean of 54, pulmonary      capillary wedge pressure was at 80, his ejection fraction was 45%. He also had     mild diastolic dysfunction. The echocardiogram showed significant decrease in     ejection fraction and overall he was more symptomatic after he had a mild heart     attack. He feels much better, he has lost about 20-30 pounds over  the last     several months. He is currently on Lasix twice daily and metolazone 5 mg once     daily.  He was having some cramping of his lower extremities and has been on a     higher dose of metolazone for the past week or so. He recently had blood work     from Dr. Larson a week ago but he does not know the results. He brought his     CPAP usage data which was reviewed with him today. I reviewed his pulmonary     function test from the past as well. He recently had CT scan of the chest     without contrast which was reviewed and it showed stable lung nodules but no new    pulmonary fibrosis. He has no fever or chills, no nausea or vomiting. Overall he    feels better, his breathing is better, he does not have significant chest     tightness. He has some shortness of breath with activities but has much improved    over time. I reviewed and discussed the echocardiogram as well as left and right    heart catheterization with him extensively.            ROS      Constitutional:  Complains of: Fatigue; Denies: Fever, Weight gain, Weight loss,    Chills, Insomnia, Other      Respiratory/Breathing:  Complains of: Shortness of air, Wheezing; Denies: Cough,    Hemoptysis, Pleuritic pain, Other      Endocrine:  Denies: Polydipsia, Polyuria, Heat/cold intolerance, Diabetes, Other      Eyes:  Denies: Blurred vision, Vision Changes, Other      Ears, nose, mouth, throat:  Denies: Mouth lesions, Thrush, Throat pain,     Hoarseness, Allergies/Hay Fever, Post Nasal Drip, Headaches, Recent Head Injury,    Nose Bleeding, Neck Stiffness, Thyroid Mass, Hearing Loss, Ear Fullness, Dry     Mouth, Nasal or Sinus Pain, Dry Lips, Nasal discharge, Nasal congestion, Other      Cardiovascular:  Denies: Palpitations, Syncope, Claudication, Chest Pain, Wake     up Gasping for air, Leg Swelling, Irregular Heart Rate, Cyanosis, Dyspnea on     Exertion, Other      Gastrointestinal:  Denies: Nausea, Constipation, Diarrhea, Abdominal pain,      Vomiting, Difficulty Swallowing, Reflux/Heartburn, Dysphagia, Jaundice,     Bloating, Melena, Bloody stools, Other      Genitourinary:  Denies: Urinary frequency, Incontinence, Hematuria, Urgency,     Nocturia, Dysuria, Testicular problems, Other      Musculoskeletal:  Denies: Joint Pain, Joint Stiffness, Joint Swelling, Myalgias,    Other      Hematologic/lymphatic:  DENIES: Lymphadenopathy, Bruising, Bleeding tendencies,     Other      Neurological:  Denies: Headache, Numbness, Weakness, Seizures, Other      Psychiatric:  Denies: Anxiety, Appropriate Effect, Depression, Other      Sleep:  No: Excessive daytime sleep, Morning Headache?, Snoring, Insomnia?, Stop    breathing at sleep?, Other      Integumentary:  Denies: Rash, Dry skin, Skin Warm to Touch, Other      Immunologic/Allergic:  Denies: Latex allergy, Seasonal allergies, Asthma,     Urticaria, Eczema, Other      Immunization status:  No: Up to date            FAMILY/SOCIAL/MEDICAL HX      Surgical History:  Yes: Appendectomy (AGE 12YR), Bowel Surgery (COLONOSCOPY),     Kidney Surgery (Kidney stones), Oral Surgery (TEETH REMOVED); No: AAA Repair,     Abdominal Surgery, Angioplasty, Back Surgery, Bladder Surgery, Breast Surgery,     CABG, Carotid Stenosis, Cholecystectomy, Ear Surgery, Eye Surgery, Head Surgery,    Hernia Surgery, Nose Surgery, Orthopedic Surgery, Prostatectomy, Rectal Surgery,    Spinal Surgery, Testicular Surgery, Throat Surgery, Valve Replacement, Vascular     Surgery, Other Surgeries      Stroke - Family Hx:  Mother      Diabetes - Family Hx:  Brother      Cancer/Type - Family Hx:  Father      Other Family Medical History:  Mother      Is Father Still Living?:  No      Is Mother Still Living?:  Yes       Family History:  Yes      Social History:  No Tobacco Use, No Alcohol Use, No Recreational Drug use      Smoking status:  Former smoker (1 ppd for 20 yeasr quit 11/15/15)      Anticoagulation Therapy:  No      Antibiotic Prophylaxis:   "No      Medical History:  Yes: Arthritis, Blood Disease (\"POLYMYACITIS.\"), High Blood     Pressure (ON MEDS), Kidney Stones (\"19 YEARS AGO, AND PASSED ANOTHER ONE HERE IN    E.D. 2-3 YRS AGO.\"), Reflux Disease (ON MEDICATION); No: Alcoholism, Allergies,     Anemia, Asthma, Broken Bones, Cataracts, Chemical Dependency,     Chemotherapy/Cancer, Chronic Bronchitis/COPD, Emphysema, Chronic Liver Disease,     Colon Trouble, Colitis, Diverticulitis, Congestive Heart Failu, Deafness or     Ringing Ears, Convulsions, Depression, Anxiety, Bipolar Disorder, Diabetes,     Epilepsy, Seizures, Forgetfullness, Glaucoma, Gall Stones, Gout, Head Injury,     Heart Attack, Heart Murmur, Hemorrhoids/Rectal Prob, Hepatitis, Hiatal Hernia,     High Cholesterol, HIV (Do not ask - volu, Jaundice, Kidney or Bladder Disease,     Migrane Headaches, Mitral Valve Prolapse, Night sweats, Phlebitis, Psychiatric     Care, Rheumatic Fever, Sexually Transmitted Dis, Shortness Of Breath, Sinus     Trouble, Skin Disease/Psoriais/Ecz, Stroke, Thyroid Problem, Tuberculosis or Pos    TB Te, Miscellaneous Medical/oth      Psychiatric History      none            PREVENTION      Hx Influenza Vaccination:  Yes      Date Influenza Vaccine Given:  Sep 1, 2019      Influenza Vaccine Declined:  No      2 or More Falls Past Year?:  No      Fall Past Year with Injury?:  No      Hx Pneumococcal Vaccination:  Yes      Encouraged to follow-up with:  PCP regarding preventative exams.      Chart initiated by      dean pan ma            ALLERGIES/MEDICATIONS      Allergies:        Coded Allergies:             SULFA (SULFONAMIDE ANTIBIOTICS) (Verified  Allergy, Unknown, 12/31/19)           VALDECOXIB (Verified  Allergy, Unknown, 12/31/19)      Medications    Last Reconciled on 12/31/19 08:46 by MICHAEL MARTINEZ MD      metOLazone (metOLazone) 5 Mg Tablet      5 MG PO QDAY for 30 Days, #30 TAB         Prov: Licha Alan PA-C         12/10/19       Furosemide* " (Lasix*) 40 Mg Tablet      40 MG PO BID@09,17, #60 TAB 0 Refills         Prov: Licha Alan PA-C         12/10/19       Omeprazole (Omeprazole*) 40 Mg Capsule      40 MG PO BID, #60 CAP 9 Refills         Prov: Louis Geronimo         2/28/18       Multivitamins (Multi-Vitamin) 1 Each Tablet      1 TAB PO QDAY, #30 TAB 0 Refills         Reported         2/12/18       Aspirin Chew (Aspirin Baby) 81 Mg Tab.chew      81 MG PO QDAY, #30 TAB.CHEW 0 Refills         Reported         2/12/18       Hydroxychloroquine Sulfate (Plaquenil*) 200 Mg Tab      200 MG PO QDAY, TAB         Reported         1/9/18       Lisinopril* (Lisinopril*) 20 Mg Tablet      20 MG PO HS, #30 TAB 0 Refills         Reported         1/9/18       predniSONE (predniSONE) 2.5 Mg Tablet      2.5 MG PO QDAY, TAB         Reported         1/9/18      Current Medications      Current Medications Reviewed 12/31/19            EXAM      CONSTITUTIONAL: Pleasant male in no acute distress,  normal conversant.       EYES : Pink conjunctive, no ptosis, PERRL.       ENMT : Nose and ears appear normal, normal dentition, mild posterior pharyngeal     wall erythema, no sinus tenderness. Mallampati classification II      Neck: Nontender, no masses, no thyromegaly, no nodules.      Resp : Bilateral diminished breath sounds, no wheezing or rhonchi. Scattered     crackles on the left base. Resonant to percussion bilaterally.      CVS  : No carotid bruits, s1s2 nl, RRR, no murmur, rubs or gallop, no peripheral    edema       Chest wall: Normal rise with inspiration, nontender on palpation.      GI   : Abdomen soft, with no masses, no hepatosplenomegaly, no hernias, BS+      MSK  : Normal gait and station, no digital cyanosis or clubbing       Skin : No rashes, ulcerations or lesions, normal turgor and temperature      Neuro: CN II - XII intact, no sensory deficits, DTRs intact and symmetrical, no     motor weakness      Psych: Appropriate affect, A   Vtials      Vitals:              Height 5 ft 8 in / 172.72 cm           Weight 257 lbs 1 oz / 116.528064 kg           BSA 2.27 m2           BMI 39.1 kg/m2           Temperature 98.2 F / 36.78 C - Oral           Pulse 95           Respirations 16           Blood Pressure 152/92 Sitting, Right Arm           Pulse Oximetry 92%, room air            REVIEW      Results Reviewed      PCCS Results Reviewed?:  Yes Prev Lab Results, Yes Prev Radiology Results, Yes     Previous [facility] Records      Lab Results      The patient's CPAP usage data for the last month was reviewed and his average     daily usage is 7 hours and 25 minutes, apnea hypopnea index of 4.1. He has very     severe sleep apnea with apnea hypopnea index of close to 50 in the past. He is     currently on BiPAP with pressures of 21/13.             The patient's left and right heart catheterization was reviewed. Left heart     catheterization showed no obstructive coronary artery disease and mildly reduced    ejection fraction with ejection fraction of 45% and severe pulmonary     hypertension. Right heart catheterization showed mild diastolic dysfunction. His    hemodynamics showed pulmonary artery pressure of 83/37 with mean of 54.     Pulmonary capillary wedge pressure was 18. Cardiac index was 2.55.      Radiographic Results               [facility]                PACS RADIOLOGY REPORT            Patient: FREDERICK KNOWLES JR   Acct: #[id number]   Report: #[id number]            UNIT #: [id number]    DOS: 19 0800      INSURANCE:BLUE ACCESS NETWORK - [facility]   ORDER #:[id number]      LOCATION:[facility]     : 1965            PROVIDERS      ADMITTING:     ATTENDING: Louis Geronimo      FAMILY:  NAVEED ELIZABETH   ORDERING:  Louis Geronimo         OTHER:    DICTATING:  JOSIE VAZQUEZ MD            REQ #:[id number]   EXAM:WO - CT CHEST without CONTRAST      REASON FOR EXAM:        REASON FOR VISIT:  PULM NOD             *******Signed******         PROCEDURE:   CT CHEST WITHOUT CONTRAST             COMPARISON:   Other, CT, CHEST W/O CONTRAST, 12/30/2016, 13:38.  [facility], CT, CHEST       W/ CONTRAST, 9/30/2019, 16:07.             INDICATIONS:   F/U PREV ABNORMAL CT CHEST, SOA, PREV SMOKER             TECHNIQUE:   CT images were created without the administration of contrast     material.               PROTOCOL:     Standard imaging protocol performed                RADIATION:     DLP: 575.2mGy*cm          Automated exposure control was utilized to minimize radiation dose.              FINDINGS:         No pathologically enlarged lymph nodes in the chest.  Cardiomegaly.  Bilateral     pleural based       nodular opacities are unchanged dating back to 12/30/2016.  An index nodular     opacity in the medial       right lung base measures 3.3 x 1.8 cm and is unchanged.  No new dense     consolidation or new       pulmonary nodule.  No acute findings in the included upper abdomen.      Nonobstructing calculi in the       right kidney.  Renal cysts.  No aggressive appearing bone change.             CONCLUSION:   Stable pleural-based nodular opacities dating back to at least     12/30/2016, in keeping       with a benign finding.              JOSIE VAZQUEZ MD             Electronically Signed and Approved By: JOSIE VAZQUEZ MD on 12/18/2019 at 8:40                        Until signed, this is an unconfirmed preliminary report that may contain      errors and is subject to change.                                              ESTELA:      D:12/18/19 0840      PFT Results      [id number]  [id number]                                 [facility]                          [employer]                            [place], [place]  [address]               __________________________________________________________________________             Patient Name:                    Attending Physician:      Braxton Mcghee Jr NAVIN KAINI, M.D.             Patient Visit # MR #            Admit Date  Disch Date     Location      [id number]    [id number]      01/25/2018                 CT- -             Date of Birth      1965      __________________________________________________________________________      0821 - DIAGNOSTIC REPORT             PULMONARY FUNCTION TEST             SPIROMETRY:      Spirometry shows possible severe restrictive defect.      FEV1/FVC ratio is 74%.      FEV1 is 1.27 L, 35% of predicted.      FVC is 1.71 L, 37% of predicted.             BRONCHODILATOR RESPONSE:      There is a significant response to bronchodilator administration.      FEV1 increased from 1.27 L to 1.50 L, 18% change.      FVC increased from 1.71 L to 1.98 L, 16% change.             LUNG VOLUMES:      Lung volumes confirmed restriction.      Total lung capacity is 3.33 L, 50% of predicted.      Residual volume is 1.52 L, 74% of predicted.             DIFFUSION:      Diffusion capacity is mildly decreased, 66% of predicted.             FLOW VOLUME LOOP:      Flow volume loop compatible with restrictive process.             CONCLUSION:      Normal FEV1/FVC with low FEV1 and FVC, with low lung capacity and lung      volumes with low diffusion capacity.  It suggests severe restrictive airway      disease, likely mixture of body habitus and pulmonary parenchymal disease.      There is significant bronchial reversibility seen as well.  Please correlate      clinically.             To be electronically signed in [place]      [id number] LOUIS GERONIMO M.D.             NK:rt      D:  02/08/2018 12:47      T:  02/08/2018 12:54      #[id number]             Until signed, this is an unconfirmed preliminary report that may contain      errors and is subject to change.                   02/12/18 1637  <Electronically signed by Louis Geronimo MD>            Assessment      FLORENTINO (dyspnea on exertion) - R06.09             COPD (chronic obstructive pulmonary disease) - J44.9            Notes      New Medications      * Fluticasone/Umeclidin/Vilanter (Trelegy Ellipta 100-62.5-25) 1 EACH       BLST.W.DEV: 1 PUFF INH RTQDAY #1      * MDI-Albuterol (Ventolin HFA) 8 GM HFA.AER.AD: 2 PUFFS INH Q4-6H PRN DYSPNEA #1      Discontinued Medications      * Furosemide 40 MG TABLET: 40 MG PO BID@09,17 #60      New Diagnostics      * Lung Vent.Perf NM, SCHEDULED PROCEDURE      * PFT-Comp, PrePost,DLCO,BodyBox, Week         Dx: FLORENTINO (dyspnea on exertion) - R06.09      * 6 Min Walk w O2 Titration Test, Routine         Dx: FLORENTINO (dyspnea on exertion) - R06.09      PLAN:      The patient is a 54 year old male with severe pulmonary hypertension, severe     obstructive sleep apnea and severe obstructive and restrictive airway disease,     history of systemic lupus and 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 He has history of     right heart failure and morbid obesity but it is improving now. He has history     of blood clots in the past but his recent lower extremity ultrasound was     negative.             1. Pulmonary hypertension. It seems to me that he has multifactorial pulmonary     hypertension with class 2 and 3 and obstructive sleep apnea. Continue to use     BiPAP religiously, his BiPAP pressure is 31/13. Repeat pulmonary function test.       2. I will start him on trelegy ellipta inhaler once daily, use Ventolin as     needed.       3. I will check a VQ scan.       4. I will repeat pulmonary function test and 6 minute walk test.       5. Continue Lasix twice daily.       6. We will obtain the records of his labs from Dr. Larson office.  He may need     to go back on metolazone at a low dose.       7. At this time I will hold off on any pulmonary vasodilators as his recent     change with significant pulmonary hypertension seems to be related to his recent    heart attack and low ejection fraction and diastolic heart failure.       8.  Combination of low ejection fraction and diastolic heart failure.  We will     check a VQ scan and if it is positive he might qualify for Adempas and     anticoagulation, otherwise hold off on any other mediations at this time.       9. We will repeat echocardiogram in 2-3 months.       10.  I will follow up with him as scheduled for 02/03/2020, sooner if needed.            Patient Education      Education resources provided:  Yes      Patient Education Provided:  Acute Bronchitis            Patient Education:        Chronic Obstructive Pulmonary Disease            Electronically signed by Louis Geronimo  01/22/2020 15:44       Disclaimer: Converted document may not contain table formatting or lab diagrams. Please see [employer] for the authenticated document.

## 2021-05-28 NOTE — PROGRESS NOTES
Patient: FREDERICK KNOWLES JR     Acct: [id number]     Report: #[id number]  UNIT #: [id number]     : 1965    Encounter Date:2019  PRIMARY CARE: NAVEED ELIZABETH  ***Signed***  --------------------------------------------------------------------------------------------------------------------  Chief Complaint      Encounter Date      Dec 6, 2019            Primary Care Provider            Naveed CARRASCO            Referring Provider      Tenisha Clayton            Patient Complaint      Patient is complaining of      Patient here today for f/u after heart cath, COPD            VITALS      Height 5 ft 8 in / 172.72 cm      Weight 257 lbs  / 116.949191 kg      BSA 2.27 m2      BMI 39.1 kg/m2      Temperature 98.0 F / 36.67 C - Oral      Pulse 95      Respirations 16      Blood Pressure 143/81 Sitting, Left Arm      Pulse Oximetry 94%, room air      Initial Exhaled Nitrous Oxide      Exhaled Nitrous Oxide Results:  11            HPI      The patient is a very pleasant 54 year old white male patient of Dr. Geronimo's     last seen by him in October. 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 He has very severe     obstructive sleep apnea and had been on BiPAP for several months which is     significantly helping him. He had recent echocardiogram done at an outside     hospital with significantly enlarged right heart and decreased ejection fraction    of 45%. He had an echocardiogram in 2018 that was normal. He was started on     Lasix and his last blood work in our system from 2019 showed elevated     NT-proBNP over 1100. He is on Lasix 40 mg twice daily, denies any increased     swelling, reports he has lost weight and his breathing is improved. He has     overall felt a lot better since being on the Lasix.  He was scheduled for a fo    llow up  appointment today after having right and left heart catheterization done    at [facility] in [place] yesterday per the patient's report but I     do  not have those reports. The patient states his cardiologist Dr. Mendoza stated     he did not have any coronary artery disease but did have elevated pulmonary     artery pressures. I am able to see that his mean PA pressure was listed at 54     which is quite high. His wedge pressure was also slightly high at 18.             I reviewed his Review of Systems, medical, surgical and family history and agree    with those as entered.      Copies To:   Louis Geronimo      Constitutional:  Complains of: Fatigue; Denies: Fever, Weight gain, Weight loss,    Chills, Insomnia, Other      Respiratory/Breathing:  Complains of: Shortness of air, Cough; Denies: Wheezing,    Hemoptysis, Pleuritic pain, Other      Endocrine:  Denies: Polydipsia, Polyuria, Heat/cold intolerance, Diabetes, Other      Eyes:  Denies: Blurred vision, Vision Changes, Other      Ears, nose, mouth, throat:  Denies: Congestion, Dysphagia, Hearing Changes, Nose    Bleeding, Nasal Discharge, Throat pain, Tinnitus, Other      Cardiovascular:  Denies: Chest Pain, Exertional dyspnea, Peripheral Edema,     Palpitations, Syncope, Wake up Gasping for air, Orthopnea, Tachycardia, Other      Gastrointestinal:  Denies: Abdominal pain/cramping, Bloody stools, Constipation,    Diarrhea, Melena, Nausea, Vomiting, Other      Genitourinary:  Denies: Dysuria, Urinary frequency, Incontinence, Hematuria,     Urgency, Other      Musculoskeletal:  Denies: Joint Pain, Joint Stiffness, Joint Swelling, Myalgias,    Other      Hematologic/lymphatic:  DENIES: Lymphadenopathy, Bruising, Bleeding tendencies,     Other      Psychiatric:  Denies: Anxiety, Appropriate Effect, Depression, Other      Sleep:  No: Excessive daytime sleep, Morning Headache?, Snoring, Insomnia?, Stop    breathing at sleep?, Other     "  Integumentary:  Denies: Rash, Dry skin, Skin Warm to Touch, Other            FAMILY/SOCIAL/MEDICAL HX      Surgical History:  Yes: Appendectomy (AGE 12YR), Bowel Surgery (COLONOSCOPY),     Kidney Surgery (Kidney stones), Oral Surgery (TEETH REMOVED); No: AAA Repair,     Abdominal Surgery, Angioplasty, Back Surgery, Bladder Surgery, Breast Surgery,     CABG, Carotid Stenosis, Cholecystectomy, Ear Surgery, Eye Surgery, Head Surgery,    Hernia Surgery, Nose Surgery, Orthopedic Surgery, Prostatectomy, Rectal Surgery,    Spinal Surgery, Testicular Surgery, Throat Surgery, Valve Replacement, Vascular     Surgery, Other Surgeries      Stroke - Family Hx:  Mother      Diabetes - Family Hx:  Brother      Cancer/Type - Family Hx:  Father      Other Family Medical History:  Mother      Is Father Still Living?:  No      Is Mother Still Living?:  Yes       Family History:  Yes      Social History:  No Tobacco Use, No Alcohol Use, No Recreational Drug use      Smoking status:  Former smoker      Anticoagulation Therapy:  No      Antibiotic Prophylaxis:  No      Medical History:  Yes: Arthritis, Blood Disease (\"POLYMYACITIS.\"), High Blood     Pressure (ON MEDS), Kidney Stones (\"19 YEARS AGO, AND PASSED ANOTHER ONE HERE IN    E.D. 2-3 YRS AGO.\"), Reflux Disease (ON MEDICATION); No: Alcoholism, Allergies,     Anemia, Asthma, Broken Bones, Cataracts, Chemical Dependency,     Chemotherapy/Cancer, Chronic Bronchitis/COPD, Emphysema, Chronic Liver Disease,     Colon Trouble, Colitis, Diverticulitis, Congestive Heart Failu, Deafness or     Ringing Ears, Convulsions, Depression, Anxiety, Bipolar Disorder, Diabetes,     Epilepsy, Seizures, Forgetfullness, Glaucoma, Gall Stones, Gout, Head Injury,     Heart Attack, Heart Murmur, Hemorrhoids/Rectal Prob, Hepatitis, Hiatal Hernia,     High Cholesterol, HIV (Do not ask - volu, Jaundice, Kidney or Bladder Disease,     Migrane Headaches, Mitral Valve Prolapse, Night sweats, Phlebitis, " Psychiatric     Care, Rheumatic Fever, Sexually Transmitted Dis, Shortness Of Breath, Sinus     Trouble, Skin Disease/Psoriais/Ecz, Stroke, Thyroid Problem, Tuberculosis or Pos    TB Te, Miscellaneous Medical/oth      Psychiatric History      none            PREVENTION      Hx Influenza Vaccination:  Yes      Date Influenza Vaccine Given:  Sep 1, 2019      Influenza Vaccine Declined:  No      2 or More Falls Past Year?:  No      Fall Past Year with Injury?:  No      Hx Pneumococcal Vaccination:  Yes      Encouraged to follow-up with:  PCP regarding preventative exams.      Chart initiated by      Deborah Rosales CMA            ALLERGIES/MEDICATIONS      Allergies:        Coded Allergies:             SULFA (SULFONAMIDE ANTIBIOTICS) (Verified  Allergy, Unknown, 12/6/19)           VALDECOXIB (Verified  Allergy, Unknown, 12/6/19)      Medications    Last Reconciled on 12/6/19 14:04 by SERAFIN NÚÑEZ      Furosemide (Furosemide) 40 Mg Tablet      40 MG PO BID@09,17, #60 TAB 3 Refills         Prov: Louis Geronimo         10/17/19       Omeprazole (Omeprazole*) 40 Mg Capsule      40 MG PO BID, #60 CAP 9 Refills         Prov: Louis Geronimo         2/28/18       Multivitamins (Multi-Vitamin) 1 Each Tablet      1 TAB PO QDAY, #30 TAB 0 Refills         Reported         2/12/18       Aspirin Chew (Aspirin Baby) 81 Mg Tab.chew      81 MG PO QDAY, #30 TAB.CHEW 0 Refills         Reported         2/12/18       Hydroxychloroquine Sulfate (Plaquenil*) 200 Mg Tab      200 MG PO QDAY, TAB         Reported         1/9/18       Lisinopril* (Lisinopril*) 20 Mg Tablet      20 MG PO HS, #30 TAB 0 Refills         Reported         1/9/18       predniSONE* (predniSONE*) 2.5 Mg Tablet      2.5 MG PO QDAY, TAB         Reported         1/9/18      Current Medications      Current Medications Reviewed 12/6/19            EXAM      CONSTITUTIONAL: Pleasant  normal conversant.       EYES : Pink conjunctive, no ptosis, PERRL.       ENMT : Nose and ears  appear normal, normal dentition, mild posterior pharyngeal     wall erythema, no sinus tenderness. Mallampati classification       Neck: Nontender, no masses, no thyromegaly, no nodules.      Resp : Mildly decreased breath sounds throughout, no wheezes, rhonchi or crackle    s, normal work of breathing noted.        CVS  : No carotid bruits, s1s2 nl, RRR, no murmur, rubs or gallop, no peripheral    edema       Chest wall: Normal rise with inspiration, nontender on palpation.      GI   : Abdomen soft, with no masses, no hepatosplenomegaly, no hernias, BS+      MSK  : Normal gait and station, no digital cyanosis or clubbing       Skin : No rashes, ulcerations or lesions, normal turgor and temperature      Neuro: CN II - XII intact, no sensory deficits, DTRs intact and symmetrical, no     motor weakness      Psych: Appropriate affect, A   Vitals      Vitals:             Height 5 ft 8 in / 172.72 cm           Weight 257 lbs  / 116.702010 kg           BSA 2.27 m2           BMI 39.1 kg/m2           Temperature 98.0 F / 36.67 C - Oral           Pulse 95           Respirations 16           Blood Pressure 143/81 Sitting, Left Arm           Pulse Oximetry 94%, room air            REVIEW      Results Reviewed      P[facility]S Results Reviewed?:  Yes Prev Lab Results, Yes Prev Radiology Results, Yes     Previous Mecial Records            Assessment      ASSESSMENT:       1.  Pulmonary hypertension secondary to left heart disease with full report of     left and right heart catheterization pending.       2. Severe obstructive sleep apnea and obesity hypoventilation syndrome on     nightly BiPAP.        3. Lung nodules, stable.       4. 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 Clayton     maintained on plaquenil and prednisone.       5.  History of right heart failure.       6. Morbid obesity with BMI 39.1.            PLAN:      1. I have discussed this patient with Dr. Geronimo and the results that are     available  at this time.       2. We will request full right and left heart catheterization report including     Dr. Mendoza's interpretation from [facility] done yesterday. Per the     patient's report he did not have any blockages in his coronary arteries on left     heart catheterization portion of the procedure. We will review these records and    be in touch with the patient about any additional plans or adjustments to be     made going forward.       3. The patient has had labs done at [facility] so I will requests these.    I will order NT-proBNP and BMP to review renal function and electrolytes if the     patient did not have those done there.       4. I will have him continue on current dose of Lasix 40 mg twice daily and     counseled him on fluid restriction and sodium restriction. The patient     verbalized understanding.       5. Continue BiPAP at current settings.       6. I have encouraged him to work on weight loss.       7. Continue arnuity.       8. Continue to follow up with his rheumatologist Dr. 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 He is maintained on plaquenil and     prednisone for this.       9.  He is up to date on flu and pneumonia vaccines.       10. I appreciate Dr. Geronimo's input with this patient.       11. Follow up as scheduled or in the next 1-2 months with him, sooner if needed.            Patient Education      Patient Education Provided:  How to use an Inhaler      Time Spent:  > 50% /Coord [facility]            Electronically signed by SHE ASHLEY PA-C  12/31/2019 11:25       Disclaimer: Converted document may not contain table formatting or lab diagrams. Please see [employer] [facility] for the authenticated document.

## 2021-05-28 NOTE — PROGRESS NOTES
Patient: FREDERICK KNOWLES JR     Acct: [id number]     Report: #[id number]  UNIT #: [id number]     : 1965    Encounter Date:2020  PRIMARY CARE: NAVEED ELIZABETH  ***Signed***  --------------------------------------------------------------------------------------------------------------------  Chief Complaint      Encounter Date      Feb 3, 2020            Primary Care Provider            Naveed CARRASCO            Referring Provider      Tenisha Clayton            Patient Complaint      Patient is complaining of      soa follow up/results            VITALS      Height 5 ft 8 in / 172.72 cm      Weight 251 lbs 9 oz / 114.250345 kg      BSA 2.25 m2      BMI 38.2 kg/m2      Temperature 98.2 F / 36.78 C - Oral      Pulse 95      Respirations 18      Blood Pressure 132/96 Sitting, Right Arm      Pulse Oximetry 90%, room air      Initial Exhaled Nitrous Oxide      Exhaled Nitrous Oxide Results:  11            HPI      The patient is a 55 year old male with severe pulmonary hypertension, severe     obstructive sleep apnea, restrictive airway, history of systemic lupus and     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  He has a history of right heart failure and     morbid obesity.  He has a history of blood clots in the past, but recent lower     extremity ultrasound was negative.  Since last office visit, we did a VQ scan     which came back negative.  He had repeat pulmonary function test and six minute     walk test which showed improvement.  It did show restrictive airway disease, but    there was significant bronchial reversibility. We had given him Trelegy ellipta     inhaler everyday and says it makes him feel a little jittery, but overall he has    more energy and is able to do more activities at home.  He has no chest pain or     chest tightness. Leg swelling is stable and improved. He has an appointment with    Dr. Clayton in the next few weeks time.  No nausea or vomiting.            ROS       Constitutional:  Complains of: Fatigue; Denies: Fever, Weight gain, Weight loss,    Chills, Insomnia, Other      Respiratory/Breathing:  Complains of: Shortness of air; Denies: Wheezing, Cough,    Hemoptysis, Pleuritic pain, Other      Endocrine:  Denies: Polydipsia, Polyuria, Heat/cold intolerance, Diabetes, Other      Eyes:  Denies: Blurred vision, Vision Changes, Other      Ears, nose, mouth, throat:  Denies: Mouth lesions, Thrush, Throat pain,     Hoarseness, Allergies/Hay Fever, Post Nasal Drip, Headaches, Recent Head Injury,    Nose Bleeding, Neck Stiffness, Thyroid Mass, Hearing Loss, Ear Fullness, Dry     Mouth, Nasal or Sinus Pain, Dry Lips, Nasal discharge, Nasal congestion, Other      Cardiovascular:  Denies: Palpitations, Syncope, Claudication, Chest Pain, Wake     up Gasping for air, Leg Swelling, Irregular Heart Rate, Cyanosis, Dyspnea on     Exertion, Other      Gastrointestinal:  Denies: Nausea, Constipation, Diarrhea, Abdominal pain,     Vomiting, Difficulty Swallowing, Reflux/Heartburn, Dysphagia, Jaundice,     Bloating, Melena, Bloody stools, Other      Genitourinary:  Denies: Urinary frequency, Incontinence, Hematuria, Urgency, No    cturia, Dysuria, Testicular problems, Other      Musculoskeletal:  Denies: Joint Pain, Joint Stiffness, Joint Swelling, Myalgias,    Other      Hematologic/lymphatic:  DENIES: Lymphadenopathy, Bruising, Bleeding tendencies,     Other      Neurological:  Denies: Headache, Numbness, Weakness, Seizures, Other      Psychiatric:  Denies: Anxiety, Appropriate Effect, Depression, Other      Sleep:  No: Excessive daytime sleep, Morning Headache?, Snoring, Insomnia?, Stop    breathing at sleep?, Other      Integumentary:  Denies: Rash, Dry skin, Skin Warm to Touch, Other      Immunologic/Allergic:  Denies: Latex allergy, Seasonal allergies, Asthma,     Urticaria, Eczema, Other      Immunization status:  No: Up to date            FAMILY/SOCIAL/MEDICAL HX      Surgical  "History:  Yes: Appendectomy (AGE 12YR), Bowel Surgery (COLONOSCOPY),     Kidney Surgery (Kidney stones), Oral Surgery (TEETH REMOVED); No: AAA Repair,     Abdominal Surgery, Angioplasty, Back Surgery, Bladder Surgery, Breast Surgery,     CABG, Carotid Stenosis, Cholecystectomy, Ear Surgery, Eye Surgery, Head Surgery,    Hernia Surgery, Nose Surgery, Orthopedic Surgery, Prostatectomy, Rectal Surgery,    Spinal Surgery, Testicular Surgery, Throat Surgery, Valve Replacement, Vascular     Surgery, Other Surgeries      Stroke - Family Hx:  Mother      Diabetes - Family Hx:  Brother      Cancer/Type - Family Hx:  Father      Other Family Medical History:  Mother      Is Father Still Living?:  No      Is Mother Still Living?:  Yes       Family History:  Yes      Social History:  No Tobacco Use, No Alcohol Use, No Recreational Drug use      Smoking status:  Former smoker (1 ppd for 20 yeasr quit 11/15/15)      Anticoagulation Therapy:  No      Antibiotic Prophylaxis:  No      Medical History:  Yes: Arthritis, Blood Disease (\"POLYMYACITIS.\"), High Blood     Pressure (ON MEDS), Kidney Stones (\"19 YEARS AGO, AND PASSED ANOTHER ONE HERE IN    E.D. 2-3 YRS AGO.\"), Reflux Disease (ON MEDICATION); No: Alcoholism, Allergies,     Anemia, Asthma, Broken Bones, Cataracts, Chemical Dependency,     Chemotherapy/Cancer, Chronic Bronchitis/COPD, Emphysema, Chronic Liver Disease,     Colon Trouble, Colitis, Diverticulitis, Congestive Heart Failu, Deafness or     Ringing Ears, Convulsions, Depression, Anxiety, Bipolar Disorder, Diabetes,     Epilepsy, Seizures, Forgetfullness, Glaucoma, Gall Stones, Gout, Head Injury,     Heart Attack, Heart Murmur, Hemorrhoids/Rectal Prob, Hepatitis, Hiatal Hernia,     High Cholesterol, HIV (Do not ask - volu, Jaundice, Kidney or Bladder Disease,     Migrane Headaches, Mitral Valve Prolapse, Night sweats, Phlebitis, Psychiatric     Care, Rheumatic Fever, Sexually Transmitted Dis, Shortness Of Breath, Sinus "     Trouble, Skin Disease/Psoriais/Ecz, Stroke, Thyroid Problem, Tuberculosis or Pos    TB Te, Miscellaneous Medical/oth      Psychiatric History      none            PREVENTION      Hx Influenza Vaccination:  Yes      Date Influenza Vaccine Given:  Sep 1, 2019      Influenza Vaccine Declined:  No      2 or More Falls Past Year?:  No      Fall Past Year with Injury?:  No      Hx Pneumococcal Vaccination:  Yes      Encouraged to follow-up with:  PCP regarding preventative exams.      Chart initiated by      dean pan ma            ALLERGIES/MEDICATIONS      Allergies:        Coded Allergies:             SULFA (SULFONAMIDE ANTIBIOTICS) (Verified  Allergy, Unknown, 2/3/20)           VALDECOXIB (Verified  Allergy, Unknown, 2/3/20)      Medications    Last Reconciled on 2/3/20 08:34 by LOUIS GERONIMO MD      Beclomethasone Dipropionate (Qvar 80 Redihaler 10.6 GM) 10.6 Gm Hfa.aeroba               Prov: Louis Geronimo         2/3/20       Beclomethasone Dipropionate (Qvar 80 Redihaler 10.6 GM) 10.6 Gm Hfa.aeroba      1 PUFF INH RTBID, #1 INH 9 Refills         Prov: Louis Geronimo         2/3/20       Fluticasone/Vilanterol 100-25 Mcg Inh (Breo Ellipta 100-25 Mcg Inh) 1 Each     Blst.w.dev      1 PUFF INH QDAY for 30 Days, #1 MDI 3 Refills         Prov: Louis Geronimo         1/9/20       MDI-Albuterol (Ventolin HFA) 8 Gm Hfa.aer.ad      2 PUFFS INH Q4-6H PRN for DYSPNEA, #1 INH 6 Refills         Prov: Louis Geronimo         12/31/19       Furosemide* (Lasix*) 40 Mg Tablet      40 MG PO BID@09,17, #60 TAB 0 Refills         Prov: Licha Alan PA-C         12/10/19       Omeprazole (Omeprazole*) 40 Mg Capsule      40 MG PO BID, #60 CAP 9 Refills         Prov: Louis Geronimo         2/28/18       Multivitamins (Multi-Vitamin) 1 Each Tablet      1 TAB PO QDAY, #30 TAB 0 Refills         Reported         2/12/18       Aspirin Chew (Aspirin Baby) 81 Mg Tab.chew      81 MG PO QDAY, #30 TAB.CHEW 0 Refills         Reported         2/12/18        Hydroxychloroquine Sulfate (Plaquenil*) 200 Mg Tab      200 MG PO QDAY, TAB         Reported         1/9/18       Lisinopril* (Lisinopril*) 20 Mg Tablet      20 MG PO HS, #30 TAB 0 Refills         Reported         1/9/18       predniSONE (predniSONE) 2.5 Mg Tablet      2.5 MG PO QDAY, TAB         Reported         1/9/18      Current Medications      Current Medications Reviewed 2/3/20            EXAM      CONSTITUTIONAL: Pleasant male in no acute distress,  normal conversant.       EYES : Pink conjunctive, no ptosis, PERRL.       ENMT : Nose and ears appear normal, normal dentition, mild posterior pharyngeal     wall erythema, no sinus tenderness. Mallampati classification II      Neck: Nontender, no masses, no thyromegaly, no nodules.      Resp : Bilateral diminished breath sounds, no wheezing or rhonchi. Scattered     crackles on the left base. Resonant to percussion bilaterally.      CVS  : No carotid bruits, s1s2 nl, RRR, no murmur, rubs or gallop, no peripheral    edema       Chest wall: Normal rise with inspiration, nontender on palpation.      GI   : Abdomen soft, with no masses, no hepatosplenomegaly, no hernias, BS+      MSK  : Normal gait and station, no digital cyanosis or clubbing       Skin : No rashes, ulcerations or lesions, normal turgor and temperature      Neuro: CN II - XII intact, no sensory deficits, DTRs intact and symmetrical, no     motor weakness      Psych: Appropriate affect, A   Vtials      Vitals:             Height 5 ft 8 in / 172.72 cm           Weight 251 lbs 9 oz / 114.462950 kg           BSA 2.25 m2           BMI 38.2 kg/m2           Temperature 98.2 F / 36.78 C - Oral           Pulse 95           Respirations 18           Blood Pressure 132/96 Sitting, Right Arm           Pulse Oximetry 90%, room air            REVIEW      Results Reviewed      PCCS Results Reviewed?:  Yes Prev Lab Results, Yes Prev Radiology Results, Yes     Previous Mecial Records      Lab Results      The  patient's CPAP usage data was reviewed.  On the days he has been using it,     his CPAP daily use is around 7 hours and 16 minutes. His apnea/hypopnea score is    5.6.  His CPAP pressure is 21 x 13, BiPAP is 21 x 13.              The patient's six minute walk distance was 1440 feet with oxygen saturation     dropping from 98-92%, but did not drop standing up.              Blood work from [facility] was reviewed from 2019 and     2019.  Serum magnesium was normal.  Serum creatinine 1, BUN high.  Renal     panel otherwise was normal.      Radiographic Results               [facility]                PACS RADIOLOGY REPORT            Patient: FREDERICK KNOWLES JR   Acct: #[id number]   Report: #[id number]            UNIT #: [id number]    DOS: 20 0732      INSURANCE:BLUE ACCESS NETWORK - PPO   ORDER #:[id number]      LOCATION:NM     : 1965            PROVIDERS      ADMITTING:     ATTENDING: Louis Geronimo      FAMILY:  NAVEED ELIZABETH   ORDERING:  Louis Geronimo         OTHER:    DICTATING:  Yony Garduno MD            REQ #:[id number]   EXAM:LSVP - LUNG SCAN VENT PERFUSION      REASON FOR EXAM:  COPD      REASON FOR VISIT:  COPD            *******Signed******         PROCEDURE:   LUNG SCAN VENTILATION AND PERFUSION             COMPARISON:   [facility], CR, CHEST PA/AP   8:52.  [facility], CT, CHEST W/O CONTRAST, 2019, 8:09.             INDICATIONS:   COPD/ dyspnea on exertion             TECHNIQUE:   After obtaining the patient's consent, a ventilation/perfusion scan    was obtained in the       usual manner.        RADIONUCLIDE:         54 mCi     Tc99m DTPA - AEROSOL      RADIONUCLIDE #2:    4.3 mCi    Tc99m MAA - I.V.             FINDINGS:         The ventilation and perfusion images in this patient are normal.  The     accompanying chest radiograph       shows no acute  process.             CONCLUSION:   Normal ventilation perfusion lung scan.  This excludes the     diagnosis of pulmonary       embolism with a very high degree of clinical certainty              Yony Garduno MD             Electronically Signed and Approved By: Yony Garduno MD on 2020 at 9:38                        Until signed, this is an unconfirmed preliminary report that may contain      errors and is subject to change.                                              KLIJO:      D:20 0938               [facility]                PACS RADIOLOGY REPORT            Patient: FREDERICK KNOWLES JR   Acct: #[id number]   Report: #[id number]            UNIT #: [id number]    DOS: 20 0850      INSURANCE:BLUE ACCESS NETWORK - O   ORDER #:[id number]      LOCATION:NM     : 1965            PROVIDERS      ADMITTING:     ATTENDING: Louis Geronimo      FAMILY:  NAVEED ELIZABETH   ORDERING:  Louis Geronimo         OTHER:    DICTATING:  Sabas Bridges MD            REQ #:[id number]   EXAM:CXR2 - CHEST 2V AP PA LAT      REASON FOR EXAM:  COPD      REASON FOR VISIT:  COPD            *******Signed******         PROCEDURE:   CHEST AP/PA AND LATERAL             COMPARISON:   [facility], CT, CHEST W/O CONTRAST,     2019, 8:09.  [facility], , CHEST PA/AP          INDICATIONS:   COPD             FINDINGS:         Stable heart and mediastinal contour.  The larry are stable and there is no     significant pleural       effusion.  There are slightly low lung volumes.  No acute osseous abnormality.      There could be mild       emphysematous changes.  Pleural-based densities better seen on the recent CT     from 2019.             CONCLUSION:         1. No definite acute disease.  Bilateral pleural based nodules better seen on     recent CT scan.  Mild       chronic interstitial changes and low lung  volumes.              CHARITY AUGUSTINE MD             Electronically Signed and Approved By: CHARITY AUGUSTINE MD on 2020 at 9:52                           Until signed, this is an unconfirmed preliminary report that may contain      errors and is subject to change.                                              SCHJO:      D:20 0942      PFT Results      Patient: BRAXTON MCGHEE JR   Acct: #[id number]   Report: #[id number]            UNIT #: [id number]    DOS:       LOCATION:NM     : 1965            PROVIDERS      ADMITTING:     FAMILY:  NAVEED WHITE GLENN         OTHER:       DICTATING:  Louis Geronimo MD            REASON FOR VISIT:  COPD            *******Signed******                                    [facility]                          [employer]                            [facility]  [id number]               __________________________________________________________________________             Patient Name:                   Attending Physician:      Braxton Mcghee Jr NAVIN KAINI, M.D.             Patient Visit # MR #            Admit Date  Disch Date     Location      [id number]    [id number]      2020                 NM- -             Date of Birth      1965      __________________________________________________________________________      0821 - DIAGNOSTIC REPORT             PULMONARY FUNCTION TEST             SPIROMETRY:      1.  Spirometry shows severe restrictive defect.      2.  FEV1/FVC is 74%.      3.  FEV1 is 1.62 L, 46% of predicted.      4.  FVC is 2.20 L, 48% of predicted.             BRONCHODILATOR RESPONSE:      1.  There is a significant response to bronchodilator administration.      2.  FEV1 increased from 1.62 L to 1.82 L, 12% change.      3.  FVC increased from 2.20 L to 2.72 L, 3% change.             LUNG VOLUMES:      1.  Lung volumes confirm restriction.      2.  Total lung  capacity is 3.71 L, 56% of predicted.      3.  Residual volume is 1.44 L, 69% of predicted.             DIFFUSION:      Diffusion capacity is mildly decreased, 68% of predicted.             FLOW VOLUME LOOP:      Flow volume loop is compatible with a restrictive process.             COMPARISON:      Compared to pulmonary function test from 01/25/2018:      1.  Spirometry parameter shows improvement with FEV1 increased from 1.27 L to          1.62 L, 27% change.      2.  FVC increased from 1.71 L to 2.20 L, 28% change.      3.  Diffusion capacity has remained stable.      4.  Total lung capacity has increased from 3.33 L to 3.71 L, 11% change.      5.  Residual volume has decreased from 1.52 L to 1.44, 6% change.             CONCLUSION:      Normal FEV1/FVC with low FEV1 and FVC, with low lung volumes and low      diffusion capacity suggests severe restrictive airway disease, likely      pneumonitis or interstitial lung disease.  Underlying bronchial reversibility      suggests secondary reactive airway disease component as well.  Please      correlate clinically.             Overall his lung function parameters have improved over the last year.             To be electronically signed in [employer]      [id number] LOUIS GERONIMO M.D.             NK:rt      D:  01/29/2020 08:31      T:  01/29/2020 09:02      #[id number]             Until signed, this is an unconfirmed preliminary report that may contain      errors and is subject to change.                   Until signed, this is an unconfirmed preliminary report that may contain      errors and is subject to change.                     <Electronically signed by Louis Geronimo MD>                02/03/20 1238               Louis Geronimo MD:COY      D:01/29/20 0831            Assessment      Pulmonary hypertension - I27.20            Notes      New Medications      * Beclomethasone Dipropionate (Qvar 80 Redihaler  10.6 GM) 10.6 GM HFA.AEROBA: 1       PUFF INH RTBID #1      * Beclomethasone Dipropionate (Qvar 80 Redihaler 10.6 GM) 10.6 GM HFA.AEROBA          Sample - Qty 1         Instructions: 1 puff bid         Dx: Pulmonary hypertension - I27.20      Discontinued Medications      * metOLazone 5 MG TABLET: 5 MG PO QDAY 30 Days #30      New Diagnostics      * Echo Complete, 2 Months         Dx: Pulmonary hypertension - I27.20      * Renal Function Panel, Month         Dx: Pulmonary hypertension - I27.20      PLAN:      The patient is a 55 year old male with severe pulmonary hypertension, severe     sleep apnea and severe obstructive and restrictive airway disease, history of     systemic lupus and inflammatory myositis. He has history of right heart failure     and morbid obesity, but most recent lower extremity was negative.              1. Pulmonary hypertension.  Likely related to class 2 and 3 pulmonary     hypertension as well as sleep apnea.  Continue BiPAP 21/13 religiously.      Pulmonary function test shows some reversibility.  He has problems with Trilogy     with jitteriness.  I will give him a Qvar sample and sent a script for Qvar.  I     will check echocardiogram in 2-3 months. Continue Lasix and metolazone per     primary care and his cardiology service.        2.  He has inflammatory myositis and systemic lupus and those can cause     pulmonary hypertension as well, but his pulmonary artery pressure was much lower    prior to cardiac event.        3. I will follow up with him in 2-3 months, earlier if needed.            Patient Education      Education resources provided:  Yes      Patient Education Provided:  Acute Bronchitis            Electronically signed by Louis Geronimo  02/21/2020 15:41       Disclaimer: Converted document may not contain table formatting or lab diagrams. Please see [employer] for the authenticated document.

## 2021-05-28 NOTE — PROGRESS NOTES
Patient: FREDERICK KNOWLES JR     Acct: [id number]     Report: #[id number]  UNIT #: [id number]     : 1965    Encounter Date:2020  PRIMARY CARE: NAVEED ELIZABETH  ***Signed***  --------------------------------------------------------------------------------------------------------------------  Chief Complaint      Encounter Date      Sep 11, 2020            Primary Care Provider            Naveed CARRASCO            Referring Provider            Naveed CARRASCO            Patient Complaint      Patient is complaining of      1 month follow up/soa            VITALS      Height 5 ft 8 in / 172.72 cm      Weight 217 lbs 0 oz / 98.183391 kg      BSA 2.12 m2      BMI 33.0 kg/m2      Temperature 98.2 F / 36.78 C - Temporal      Pulse 93      Respirations 16      Blood Pressure 130/80 Sitting, Right Arm      Pulse Oximetry 96%, room air            HPI      The patient is a 55 year old male with severe sleep apnea, obesity, restrictive     airway disease, history of systemic lupus.  He has right heart enlargement and     right heart failure, also has diastolic heart failure and mild systolic heart     failure along with chronic lung disease and sleep apnea.  He is here for follow     up.  Since his last office visit, I had referred him to central cardiology     service. He was seen by Dr. Jayce Plummer on 07/10/2020.  He was found to have     aflutter.  At that time he was offered electrocardioversion, but he did not want    to do it, so he took metoprolol at home, but it made his symptoms worse so he     was later admitted to the hospital and had electrocardioversion.  He has been     doing significantly better after that.  He has also lost about 20 pounds over     the past three months. He has lost weight from 278 pounds down to 217 pounds     now.  He is currently on high dose Lasix, was on 80 mg twice a day, but since he    started having leg cramps it was decreased to 18 in the  morning and 14 in the     afternoon.  He also is on metolazone.  He has a BiPAP machine at 21 x 13, but     does not use it because of significant air leak and is trying not to use it at     this time. He feels like he is doing much better and does not want to use it.      He checked pulse oximeter at home and usually his oxygen level is mid 90s and it    looks like he does not need oxygen.  He is much more active since he has lost     weight and overall had cardioversion.            ROS      Constitutional:  Complains of: Fatigue; Denies: Fever, Weight gain, Weight loss,    Chills, Insomnia, Other      Respiratory/Breathing:  Complains of: Shortness of air, Wheezing; Denies: Cough,    Hemoptysis, Pleuritic pain, Other      Endocrine:  Denies: Polydipsia, Polyuria, Heat/cold intolerance, Diabetes, Other      Eyes:  Denies: Blurred vision, Vision Changes, Other      Ears, nose, mouth, throat:  Denies: Mouth lesions, Thrush, Throat pain,     Hoarseness, Allergies/Hay Fever, Post Nasal Drip, Headaches, Recent Head Injury,    Nose Bleeding, Neck Stiffness, Thyroid Mass, Hearing Loss, Ear Fullness, Dry     Mouth, Nasal or Sinus Pain, Dry Lips, Nasal discharge, Nasal congestion, Other      Cardiovascular:  Denies: Palpitations, Syncope, Claudication, Chest Pain, Wake     up Gasping for air, Leg Swelling, Irregular Heart Rate, Cyanosis, Dyspnea on     Exertion, Other      Gastrointestinal:  Denies: Nausea, Constipation, Diarrhea, Abdominal pain,     Vomiting, Difficulty Swallowing, Reflux/Heartburn, Dysphagia, Jaundice,     Bloating, Melena, Bloody stools, Other      Genitourinary:  Denies: Urinary frequency, Incontinence, Hematuria, Urgency,     Nocturia, Dysuria, Testicular problems, Other      Musculoskeletal:  Denies: Joint Pain, Joint Stiffness, Joint Swelling, Myalgias,    Other      Hematologic/lymphatic:  DENIES: Lymphadenopathy, Bruising, Bleeding tendencies,     Other      Neurological:  Denies: Headache,  "Numbness, Weakness, Seizures, Other      Psychiatric:  Denies: Anxiety, Appropriate Effect, Depression, Other      Sleep:  No: Excessive daytime sleep, Morning Headache?, Snoring, Insomnia?, Stop    breathing at sleep?, Other      Integumentary:  Denies: Rash, Dry skin, Skin Warm to Touch, Other      Immunologic/Allergic:  Denies: Latex allergy, Seasonal allergies, Asthma,     Urticaria, Eczema, Other      Immunization status:  No: Up to date            FAMILY/SOCIAL/MEDICAL HX      Surgical History:  Yes: Appendectomy (AGE 12YR), Bowel Surgery (COLONOSCOPY),     Kidney Surgery (Kidney stones), Oral Surgery (TEETH REMOVED); No: AAA Repair,     Abdominal Surgery, Angioplasty, Back Surgery, Bladder Surgery, Breast Surgery,     CABG, Carotid Stenosis, Cholecystectomy, Ear Surgery, Eye Surgery, Head Surgery,    Hernia Surgery, Nose Surgery, Orthopedic Surgery, Prostatectomy, Rectal Surgery,    Spinal Surgery, Testicular Surgery, Throat Surgery, Valve Replacement, Vascular     Surgery, Other Surgeries      Stroke - Family Hx:  Mother      Diabetes - Family Hx:  Brother      Cancer/Type - Family Hx:  Father      Other Family Medical History:  Mother      Is Father Still Living?:  No      Is Mother Still Living?:  No       Family History:  Yes      Social History:  No Tobacco Use, No Alcohol Use, No Recreational Drug use      Smoking status:  Former smoker      Anticoagulation Therapy:  No      Medical History:  Yes: Arthritis, Blood Disease (\"POLYMYACITIS.\"), High Blood     Pressure, Kidney Stones (\"19 YEARS AGO, AND PASSED ANOTHER ONE HERE IN E.D. 2-3     YRS AGO.\"), Reflux Disease (ON MEDICATION); No: Anemia, Asthma, Broken Bones,     Cataracts, Chemical Dependency, Chemotherapy/Cancer, Chronic Bronchitis/COPD,     Emphysema, Chronic Liver Disease, Colon Trouble, Colitis, Diverticulitis,     Congestive Heart Failu, Deafness or Ringing Ears, Depression, Anxiety, Bipolar     Disorder, Diabetes, Epilepsy, Seizures, " Glaucoma, Gall Stones, Gout, Head     Injury, Heart Attack, Heart Murmur, Hemorrhoids/Rectal Prob, Hepatitis, Hiatal     Hernia, HIV (Do not ask - volu, Kidney or Bladder Disease, Migrane Headaches,     Mitral Valve Prolapse, Psychiatric Care, Rheumatic Fever, Sexually Transmitted     Dis, Shortness Of Breath, Sinus Trouble, Skin Disease/Psoriais/Ecz, Stroke,     Thyroid Problem, Tuberculosis or Pos TB Te, Miscellaneous Medical/oth      Psychiatric History      none            PREVENTION      Hx Influenza Vaccination:  Yes      Date Influenza Vaccine Given:  Sep 1, 2019      Influenza Vaccine Declined:  No      2 or More Falls in Past Year?:  No      Fall Past Year with Injury?:  No      Hx Pneumococcal Vaccination:  Yes      Encouraged to follow-up with:  PCP regarding preventative exams.      Chart initiated by      dean pan ma            ALLERGIES/MEDICATIONS      Allergies:        Coded Allergies:             SULFA (SULFONAMIDE ANTIBIOTICS) (Verified  Allergy, Unknown, 9/11/20)           VALDECOXIB (Verified  Allergy, Unknown, 9/11/20)      Medications    Last Reconciled on 9/11/20 09:18 by LOUIS GERONIMO MD      Furosemide* (Lasix*) 80 Mg Tablet      80 MG PO QDAY, #30 TAB 0 Refills         Reported         9/11/20       metOLazone (metOLazone) 5 Mg Tablet      10 MG PO QDAY for 30 Days, #60 TAB 2 Refills         Prov: Louis Geronimo         7/28/20       Apixaban (Eliquis) 5 Mg Tablet      5 MG PO BID for 30 Days, #60 TAB         Reported         7/14/20       Furosemide* (Lasix*) 40 Mg Tablet      40 MG PO BID, #60 TAB 5 Refills         Prov: Louis Geronimo         6/9/20       Beclomethasone Dipropionate (Qvar 80 Redihaler 10.6 GM) 10.6 Gm Hfa.aeroba      1 PUFF INH RTBID, #1 INH 9 Refills         Prov: Louis Geronimo         2/3/20       Aspirin Chew (Aspirin Baby) 81 Mg Tab.chew      81 MG PO QDAY, #30 TAB.CHEW 0 Refills         Reported         2/12/18       Hydroxychloroquine Sulfate (Plaquenil) 200 Mg Tab       200 MG PO QDAY, TAB         Reported         1/9/18       Lisinopril* (Lisinopril*) 20 Mg Tablet      20 MG PO HS, #30 TAB 0 Refills         Reported         1/9/18       predniSONE (predniSONE) 2.5 Mg Tablet      2.5 MG PO QDAY, TAB         Reported         1/9/18      Current Medications      Current Medications Reviewed 9/11/20            EXAM      CONSTITUTIONAL: Pleasant male, has lost weight and overall since is much better,    in no acute distress, normal conversant.       EYES : Pink conjunctive, no ptosis, PERRL.       ENMT : Nose and ears appear normal, normal dentition, mild posterior pharyngeal     wall erythema, no sinus tenderness. Mallampati classification       Neck: Nontender, no masses, no thyromegaly, no nodules.      Resp : Bilateral diminished breath sounds, overall improved, no wheezing,     crackles or rhonchi, resonant to percussion bilaterally, slight scattered     rhonchi on the right base was heard.        CVS  : No carotid bruits, s1s2 nl, RRR, no murmur, rubs or gallop, no peripheral    edema       Chest wall: Normal rise with inspiration, nontender on palpation.      GI   : Abdomen soft, with no masses, no hepatosplenomegaly, no hernias, BS+      MSK  : Normal gait and station, no digital cyanosis or clubbing       Skin : No rashes, ulcerations or lesions, normal turgor and temperature      Neuro: CN II - XII intact, no sensory deficits, DTRs intact and symmetrical, no     motor weakness      Psych: Appropriate affect, A   Extremities:  Trace peripheral edema, nontender to palpitation.      Vtials      Vitals:             Height 5 ft 8 in / 172.72 cm           Weight 217 lbs 0 oz / 98.481882 kg           BSA 2.12 m2           BMI 33.0 kg/m2           Temperature 98.2 F / 36.78 C - Temporal           Pulse 93           Respirations 16           Blood Pressure 130/80 Sitting, Right Arm           Pulse Oximetry 96%, room air            REVIEW      Results Reviewed      [facility] Results  Reviewed?:  Yes Prev Lab Results, Yes Prev Radiology Results, Yes     Previous [facility] Records            Assessment      Notes      New Medications      * Furosemide* (Lasix*) 80 MG TABLET: 80 MG PO QDAY #30      * ARFORMOTEROL TARTRATE (Brovana) 15 MCG/2 ML VIAL.NEB: 15 MCG INH RTBID #180         Instructions: DIAGNOSIS CODE REQUIRED PRIOR TO PRESCRIBING.      * Neb-Budesonide (Pulmicort) 0.5 MG/2 ML AMPUL.NEB: 0.5 MG INH BID #180      New Office Procedures      * Flu Vacc Fluarix Quadrivalent, As Soon As Possible         Flu Vacc (6Mos Up)/Pf (Fluarix Quadrivalent Syringe) 60 MCG/0.5 ML SYRINGE:        60 MICROGRAM INTRAMUSCULARLY Qty 1 SYRINGE      PLAN:  The patient is a 55 year old male with severe sleep apnea, severe     obstructive and restrictive airway disease, history of systemic lupus.  He has     right heart enlargement and possible right heart failure secondary to diastolic     heart failure and mild systolic heart failure with chronic lung disease and     sleep apnea.        1.  Pulmonary hypertension.  He has class 2 and class 3 pulmonary hypertension     with mixed obstructive and restrictive airway disease, diastolic heart failure,     aflutter and mild systolic heart failure.  Continue diuretics 80/40 daily and     metolazone.  Continue Qvar.  He was not able to tolerate long acting bronchodil    ators given his jitteriness and shakiness.        2.  Sleep apnea.  Continue with CPAP at current settings because the pressure is    too high. We will decrease the pressure to 16/11 and read the BiPAP use data in     3-4 months.      3. Systemic lupus per Dr. Clayton.        4. Continue to follow with Dr. Plummer.  He was suppose to follow with  ___,     but it is close to a six hour trip to [place] and he is not willing to make     an appointment. He wants to the cancel the appointment.  I advised him that he     needs to follow with pulmonary hypertension clinic and he wants to go to     [place]  which is 1.5 hour and very close to him.  I will try to make referral    to pulmonary hypertension clinic at [place] as soon as possible.      5. Follow up in 3-4 months, earlier if needed.            Patient Education      Education resources provided:  Yes      Patient Education Provided:  Pulmonary Hypertension            Electronically signed by Louis Geronimo  09/21/2020 17:46       Disclaimer: Converted document may not contain table formatting or lab diagrams. Please see [employer] for the authenticated document.

## 2021-05-28 NOTE — PROGRESS NOTES
Patient: FREDERICK KNOWLES JR     Acct: [id number]     Report: #[id number]  UNIT #: [id number]     : 1965    Encounter Date:2018  PRIMARY CARE: NAVEED ELIZABETH  ***Signed***  --------------------------------------------------------------------------------------------------------------------  Chief Complaint      Encounter Date      Sep 20, 2018            Referring Provider      Tenisha Clayton            Patient Complaint      Patient is complaining of      follow up            VITALS      Height 5 ft 8 in / 172.72 cm      Weight 254 lbs 0 oz / 115.84423 kg      BSA 2.40 m2      BMI 38.6 kg/m2      Temperature 98.4 F / 36.89 C - Oral      Pulse 76      Respirations 16      Blood Pressure 140/76 Sitting, Right Arm      Pulse Oximetry 93%, room air      Exhaled Nitrous Oxide Testin            HPI      The patient is a 53 year old male with history of smoking in the past, chronic     persistent cough with nodular opacities bilaterally concerning for chronic     indolent infection versus malignancy. He underwent a bronchoscopy in the past     and since then there has been no grown for fungus or atypical bacteria so we     have been following repeat imaging. He had repeat CT scan of the chest on     18 and he is here for follow up after the CT scan.            He continues to have some cough and some mucoid phlegm production early in the     morning but overall his symptoms remain stable. He was given Prednisone after     his last bronchoscopy and it did help him some. He has inflammatory myositis and    uses low dose Prednisone 2.5 mg all the time. He has no significant weight loss     or loss of appetite, no coughing up blood, no new lumps or bumps anywhere. I     reviewed the PET scan and CT scan with him today. There is minimal growth on his    lung nodule compared to CT scan done in 2016. The CT scan showed pleural based     right middle lobe nodule now measures 2 cm, prior it measured  1.8 cm in 2016.      It had minimal growth but the SUV was 2.8. We discussed the possibilities     including benign and indolent infection versus very low chance of cancer. He is     willing to have a biopsy done at this time. Risks of the biopsy was discussed     with him. He is on aspirin. He has no fevers or chills, no nausea and vomiting.     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 I advised him    to use a mask.            ROS      Constitutional:  Denies: Fatigue, Fever, Weight gain, Weight loss, Chills,     Insomnia, Other      Respiratory/Breathing:  Denies: Shortness of air, Wheezing, Cough, Hemoptysis,     Pleuritic pain, Other      Endocrine:  Denies: Polydipsia, Polyuria, Heat/cold intolerance, Diabetes, Other      Eyes:  Denies: Blurred vision, Vision Changes, Other      Ears, nose, mouth, throat:  Denies: Mouth lesions, Thrush, Throat pain,     Hoarseness, Allergies/Hay Fever, Post Nasal Drip, Headaches, Recent Head Injury,    Nose Bleeding, Neck Stiffness, Thyroid Mass, Hearing Loss, Ear Fullness, Dry     Mouth, Nasal or Sinus Pain, Dry Lips, Nasal discharge, Nasal congestion, Other      Cardiovascular:  Denies: Palpitations, Syncope, Claudication, Chest Pain, Wake     up Gasping for air, Leg Swelling, Irregular Heart Rate, Cyanosis, Dyspnea on     Exertion, Other      Gastrointestinal:  Denies: Nausea, Constipation, Diarrhea, Abdominal pain,     Vomiting, Difficulty Swallowing, Reflux/Heartburn, Dysphagia, Jaundice,     Bloating, Melena, Bloody stools, Other      Genitourinary:  Denies: Urinary frequency, Incontinence, Hematuria, Urgency,     Nocturia, Dysuria, Testicular problems, Other      Musculoskeletal:  Denies: Joint Pain, Joint Stiffness, Joint Swelling, Myalgias,    Other      Hematologic/lymphatic:  DENIES: Lymphadenopathy, Bruising, Bleeding tendencies,     Other      Neurological:  Denies: Headache, Numbness, Weakness, Seizures, Other      Psychiatric:  Denies:  "Anxiety, Appropriate Effect, Depression, Other      Sleep:  No: Excessive daytime sleep, Morning Headache?, Snoring, Insomnia?, Stop    breathing at sleep?, Other      Integumentary:  Denies: Rash, Dry skin, Skin Warm to Touch, Other      Immunologic/Allergic:  Denies: Latex allergy, Seasonal allergies, Asthma, Ur    ticaria, Eczema, Other      Immunization status:  No: Up to date            FAMILY/SOCIAL/MEDICAL HX      Surgical History:  Yes: Appendectomy (AGE 12YR), Bowel Surgery (COLONOSCOPY),     Kidney Surgery (Kidney stones), Oral Surgery (TEETH REMOVED); No: AAA Repair,     Abdominal Surgery, Angioplasty, Back Surgery, Bladder Surgery, Breast Surgery,     CABG, Carotid Stenosis, Cholecystectomy, Ear Surgery, Eye Surgery, Head Surgery,    Hernia Surgery, Nose Surgery, Orthopedic Surgery, Prostatectomy, Rectal Surgery,    Spinal Surgery, Testicular Surgery, Throat Surgery, Valve Replacement, Vascular     Surgery, Other Surgeries      Stroke - Family Hx:  Mother      Diabetes - Family Hx:  Brother      Cancer/Type - Family Hx:  Father      Other Family Medical History:  Mother      Is Father Still Living?:  No      Is Mother Still Living?:  Yes      Social History:  No Tobacco Use, No Alcohol Use, No Recreational Drug use      Smoking status:  Former smoker (1 ppd x 25 years, quit 2015)      Anticoagulation Therapy:  No      Antibiotic Prophylaxis:  No      Medical History:  Yes: Arthritis, Blood Disease (\"POLYMYACITIS.\"), High Blood     Pressure (ON MEDS), Kidney Stones (\"19 YEARS AGO, AND PASSED ANOTHER ONE HERE IN    E.D. 2-3 YRS AGO.\"), Reflux Disease (ON MEDICATION); No: Alcoholism, Allergies,     Anemia, Asthma, Broken Bones, Cataracts, Chemical Dependency,     Chemotherapy/Cancer, Chronic Bronchitis/COPD, Emphysema, Chronic Liver Disease,     Colon Trouble, Colitis, Diverticulitis, Congestive Heart Failu, Deafness or     Ringing Ears, Convulsions, Depression, Anxiety, Bipolar Disorder, Diabetes,   "   Epilepsy, Seizures, Forgetfullness, Glaucoma, Gall Stones, Gout, Head Injury,     Heart Attack, Heart Murmur, Hemorrhoids/Rectal Prob, Hepatitis, Hiatal Hernia,     High Cholesterol, HIV (Do not ask - volu, Jaundice, Kidney or Bladder Disease,     Migrane Headaches, Mitral Valve Prolapse, Night sweats, Phlebitis, Psychiatric     Care, Rheumatic Fever, Sexually Transmitted Dis, Shortness Of Breath, Sinus     Trouble, Skin Disease/Psoriais/Ecz, Stroke, Thyroid Problem, Tuberculosis or Pos    TB Te, Miscellaneous Medical/oth      Psychiatric History      none            PREVENTION      Hx Influenza Vaccination:  Yes      Date Influenza Vaccine Given:  Sep 1, 2018      Influenza Vaccine Declined:  No      2 or More Falls Past Year?:  No      Fall Past Year with Injury?:  No      Hx Pneumococcal Vaccination:  No      Encouraged to follow-up with:  PCP regarding preventative exams.      Chart initiated by      dean pan ma            ALLERGIES/MEDICATIONS      Allergies:        Coded Allergies:             SULFA (SULFONAMIDE ANTIBIOTICS) (Verified  Allergy, Unknown, 9/20/18)           VALDECOXIB (Verified  Allergy, Unknown, 9/20/18)      Medications    Last Reconciled on 9/20/18 11:55 by LOUIS GERONIMO MD      predniSONE* (predniSONE*) 20 Mg Tablet      40 MG PO QDAY, #10 TAB 3 Refills         Prov: Louis Geronimo         9/20/18       Omeprazole (Omeprazole*) 40 Mg Capsule      40 MG PO BID, #60 CAP 9 Refills         Prov: Louis Geronimo         2/28/18       Multivitamins (Multi-Vitamin) 1 Each Tablet      1 TAB PO QDAY, #30 TAB 0 Refills         Reported         2/12/18       Aspirin (Aspirin Baby *) 81 Mg Tab.chew      81 MG PO QDAY, #30 TAB.CHEW 0 Refills         Reported         2/12/18       Naproxen (Naprosyn EC) 375 Mg Tablet.dr      375 MG PO QDAY, TAB         Reported         1/9/18       Hydroxychloroquine Sulfate (Plaquenil*) 200 Mg Tab      200 MG PO QDAY, TAB         Reported         1/9/18        Lisinopril* (Lisinopril*) 20 Mg Tablet      20 MG PO HS, #30 TAB 0 Refills         Reported         1/9/18       predniSONE* (predniSONE*) 2.5 Mg Tablet      2.5 MG PO QDAY, TAB         Reported         1/9/18      Current Medications      Current Medications Reviewed 9/20/18            EXAM      CONSTITUTIONAL: Pleasant male in no acute distress, normal conversant.       EYES : Pink conjunctive, no ptosis, PERRL.       ENMT : Nose and ears appear normal, normal dentition, mild posterior pharyngeal     wall erythema. Mallampati classification II, no sinus tenderness.       Neck: Nontender, no masses, no thyromegaly, no nodules.      Resp : Bilateral diminished breath sounds, resonant to percussion bilaterally,     scattered expiratory wheezing, no crackles or rhonchi.      CVS  : No carotid bruits, s1s2 nl, RRR, no murmur, rubs or gallop, no peripheral    edema       Chest wall: Normal rise with inspiration, nontender on palpation      GI   : Abdomen soft, with no masses, no hepatosplenomegaly, no hernias, BS+      MSK  : Normal gait and station, no digital cyanosis or clubbing       Skin : No rashes, ulcerations or lesions, normal turgor and temperature      Neuro: CN II - XII intact, no sensory deficits, DTRs intact and symmetrical, no     motor weakness      Psych: Appropriate affect, A   Vtials      Vitals:             Height 5 ft 8 in / 172.72 cm           Weight 254 lbs 0 oz / 115.14100 kg           BSA 2.40 m2           BMI 38.6 kg/m2           Temperature 98.4 F / 36.89 C - Oral           Pulse 76           Respirations 16           Blood Pressure 140/76 Sitting, Right Arm           Pulse Oximetry 93%, room air            REVIEW      Results Reviewed      PCCS Results Reviewed?:  Yes Prev Lab Results, Yes Prev Radiology Results, Yes     Previous [facility] Records      Lab Results      The patient's bronchoscopy culture from the past showed no fungal or atypical     bacteria. Blood work from 06/13/18 showed  mild leukopenia otherwise normal CBC     and normal renal profile. His previous connective tissue disease panel showed     high ZENOBIA titre and high anti-DST titre.      Radiographic Results               [facility] Diagnostic Img                PACS RADIOLOGY REPORT            Patient: FREDERICK KNOWLES JR   Acct: #[id number]   Report: #[id number]            UNIT #: [id number]    DOS: 18 1000      INSURANCE:[facility] - [facility]   ORDER #:[id number]      LOCATION:[facility]     : 1965            PROVIDERS      ADMITTING:     ATTENDING: Louis Geronimo      FAMILY:  NAVEED ELIZABETH   ORDERING:  Louis Geronimo         OTHER: Tenisha Clayton   DICTATING:  JOSIE VAZQUEZ MD            REQ #:[id number]   [facility]      REASON FOR EXAM:        REASON FOR VISIT:  COUGH            *******Signed******         PROCEDURE:   CT CHEST WITHOUT CONTRAST             COMPARISON:   [facility], PET, SKULL BASE TO MID THIGH INITIAL, , 9:33.        Other, CT, CHEST W/O CONTRAST, 2016, 13:38.  [facility], CT,    CHEST W/O       CONTRAST, 2018, 9:42.             INDICATIONS:   COUGH, PAST SMOKER, BRONCHITIS.  Followup pulmonary nodules             TECHNIQUE:   CT images were created without the administration of contrast     material.               PROTOCOL:     Standard imaging protocol performed                RADIATION:     DLP: 700mGy*cm          Automated exposure control was utilized to minimize radiation dose.              FINDINGS:         Redemonstration of multifocal pleural-based nodularity, or predominately in the     lung bases and       greater on the right than the left.  All of these except 1 are unchanged in size    and appearance.        Pleural-based nodule right middle lobe (image 43) previously measured 2.0 cm.      This nodule       previously measured 1.8 cm in 2016. This nodule showed  equivocal FDG uptake on     PET-CT from       2/16/2018, with a maximum SUV of 2.8.             Cardiomegaly.  Scattered small lymph nodes in the mediastinum are nonspecific.             No acute findings in the included upper abdomen.  2 cm benign left adrenal     adenoma.  Subtle 1.6 cm       low attenuation lesion at the dome of the liver unchanged dating back to 2016,     in keeping with a       benign finding.  No aggressive appearing bone lesion.             CONCLUSION:   No acute findings in the chest.             Multifocal pleural-based nodularity in the lung bases, overall very similar     dating back to at least       2016, suggestive of benign process.             1 nodule in the right middle lobe is minimally larger and showed eco focal FDG     uptake on PET-CT.        Recommend attention to this nodule on six-month followup chest                JOSIE VAZQUEZ MD             Electronically Signed and Approved By: JOSIE VAZQUEZ MD on 8/28/2018 at 11:49                        Until signed, this is an unconfirmed preliminary report that may contain      errors and is subject to change.                                              PHILER:      D:08/28/18 1149      PFT Results      [id number]  [id number]                                 [facility]                          [facility] [employer] [facility]                            [place], [place]  [address]               __________________________________________________________________________             Patient Name:                   Attending Physician:      Braxton Mcghee Jr NAVIN KAINI, M.D.             Patient Visit # MR #            Admit Date  Disch Date     Location      [id number]    [id number]      01/25/2018                 CT- -             Date of Birth      1965      __________________________________________________________________________      0821 - DIAGNOSTIC REPORT              PULMONARY FUNCTION TEST             SPIROMETRY:      Spirometry shows possible severe restrictive defect.      FEV1/FVC ratio is 74%.      FEV1 is 1.27 L, 35% of predicted.      FVC is 1.71 L, 37% of predicted.             BRONCHODILATOR RESPONSE:      There is a significant response to bronchodilator administration.      FEV1 increased from 1.27 L to 1.50 L, 18% change.      FVC increased from 1.71 L to 1.98 L, 16% change.             LUNG VOLUMES:      Lung volumes confirmed restriction.      Total lung capacity is 3.33 L, 50% of predicted.      Residual volume is 1.52 L, 74% of predicted.             DIFFUSION:      Diffusion capacity is mildly decreased, 66% of predicted.             FLOW VOLUME LOOP:      Flow volume loop compatible with restrictive process.             CONCLUSION:      Normal FEV1/FVC with low FEV1 and FVC, with low lung capacity and lung      volumes with low diffusion capacity.  It suggests severe restrictive airway      disease, likely mixture of body habitus and pulmonary parenchymal disease.      There is significant bronchial reversibility seen as well.  Please correlate      clinically.             To be electronically signed in [facility]      [id number] LOUIS GERONIMO M.D.             NK:rt      D:  02/08/2018 12:47      T:  02/08/2018 12:54      #[id number]             Until signed, this is an unconfirmed preliminary report that may contain      errors and is subject to change.                   02/12/18 8813  <Electronically signed by Louis Geronimo MD>            Assessment      Lung nodule - R91.1            Notes      New Medications      * predniSONE* 20 MG TABLET: 40 MG PO QDAY #10      Discontinued Medications      * AZELASTINE HCL (Azelastine Nasal*) 137 MCG/0.137 ML SPRAY.PUMP: 2 PUFFS NARE       EACH BID #1      * Budesonide/Formoterol Fumarate (Symbicort 160/4.5 Mcg) 10.2 GM INH: 2 PUFF INH      BID #1      * Levofloxacin 750 MG TABLET: 750 MG PO QDAY #10      * predniSONE* 20 MG  TABLET: 40 MG PO QDAY #10      New Diagnostics      * BIOPSY LUNG CT, Week         Dx: Lung nodule - R91.1      New Office Procedures      * Flu Vaccine Quadrivalent, As Soon As Possible         FLU VACC [id number] 36MOS UP/PF (Fluzone Quadrivalent 6522-8105 Syringe) 60        MCG/0.5 ML SYRINGE:         60 MICROGRAM INTRAMUSCULARLY Qty 1 SYRINGE      * Pneumo Polysaccharide Vaccine, As Soon As Possible         Pneumococcal Vaccine Polyvalen (Pneumovax-23) 25 MCG/0.5 ML VIAL: 25        MICROGRAM INTRAMUSCULARLY Qty 25 MCG      PLAN:      The patient is a 53 year old male with a history of smoking in the past, chronic    persistent cough with nodular opacities bilaterally concerning for chronic     indolent infection versus malignancy. He also has a lung nodule and connective     tissue disease possible inflammatory myositis or systemic lupus.               1. CT scan abnormality.  PET scan was negative but one of the nodules was mildly    hypermetabolic with a SUV of 2.8.  His recent CT scan shows slight increase in     the size of the nodule. I discussed with him regarding follow up CT scan versus     biopsy.  He is willing to have a biopsy done. There is a low chance that it     could be cancer. Other possibilities include indolent infection, benign lesion     or some association with connective tissue disease such as lupus. I discussed     the case with Dr. Edmond and I have scheduled him for biopsy for next week. I     will follow up with him in a month or so.       2. We will give him Pneumovax and flu vaccines today.       3. Continue with azelastine nasal spray twice daily. Use albuterol as needed.       4. The patient understands the risks of the biopsy including pneumothorax,     bleeding, respiratory depression and that it could be fatal but he will need the    biopsy for diagnosis and treatment. The patient is agreeable.       5.I will follow up in a month, sooner if needed.            Patient Education       Education resources provided:  Yes      Patient Education Provided:  Acute Bronchitis                 Disclaimer: Converted document may not contain table formatting or lab diagrams. Please see [facility] for the authenticated document.

## 2021-05-28 NOTE — PROGRESS NOTES
Patient: FREDERICK KNOWLES JR     Acct: [id number]     Report: #[id number]  UNIT #: [id number]     : 1965    Encounter Date:10/17/2019  PRIMARY CARE: NAVEED ELIZABETH  ***Signed***  --------------------------------------------------------------------------------------------------------------------  Chief Complaint      Encounter Date      Oct 17, 2019            Primary Care Provider      Tenisha Clayton            Referring Provider      Tenisha Clayton            Patient Complaint      Patient is complaining of      jerrell and soa follow up            VITALS      Height 5 ft 8 in / 172.72 cm      Weight 254 lbs 4 oz / 115.374495 kg      BSA 2.26 m2      BMI 38.7 kg/m2      Temperature 98.1 F / 36.72 C - Oral      Pulse 99      Respirations 16      Blood Pressure 134/84 Sitting, Right Arm      Pulse Oximetry 90%, room air      Initial Exhaled Nitrous Oxide      Exhaled Nitrous Oxide Results:  11            HPI      The patient is a 54 year old male with a history of smoking in the past, chronic    persistent cough with nodular opacities, bilaterally concerning for chronic     indolent infection versus malignancy. 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  Since his last office visit, he had a sleep study done and was found    to have very severe sleep apnea. He was started on BiPAP at 21 x 13. He received    it three weeks ago and started using  it on 2019. He uses it everyday and     it does help him significantly.  He has been using it around 7 hours and 12     minutes, average apnea/hypopnea index on it is 5.1.  Over the last two weeks or     so he was having worsening shortness of breath, leg swelling and some chest     discomfort. He had orthopnea and paroxysmals nocturnal dyspnea.  He was seen by     his PCP and started on Lasix 40 mg once daily.  Blood work was done which showed    high BNP at more  than 2000.  He had an echocardiogram which showed significantly    enlarged right heart with a decreased EF at 45%.  He had previous echocardiogram    in 12/2018.  At that time, EF was normal and right heart did not show     significant enlargement and pulmonary hypertension was not severe.  His leg     swelling is improved on Lasix.  He has no chest pain or chest tightness now.  He    still has some shortness of breath with activities, but orthopnea and     paroxysmals nocturnal dyspnea is better. He does like using a BiPAP machine and     is doing well on it. He was also seen by Dr. Clayton a few weeks ago when he was     having worsening shortness of breath. At that time, he had CT scan of the chest     and lower extremity Doppler. Lower extremity Doppler was negative for any blood     clot. CT scan did not show any new findings with persistent lung nodules which     were stable.            ROS      Constitutional:  Complains of: Fatigue; Denies: Fever, Weight gain, Weight loss,    Chills, Insomnia, Other      Respiratory/Breathing:  Complains of: Shortness of air, Wheezing, Cough; Denies:    Hemoptysis, Pleuritic pain, Other      Endocrine:  Denies: Polydipsia, Polyuria, Heat/cold intolerance, Diabetes, Other      Eyes:  Denies: Blurred vision, Vision Changes, Other      Ears, nose, mouth, throat:  Denies: Mouth lesions, Thrush, Throat pain,     Hoarseness, Allergies/Hay Fever, Post Nasal Drip, Headaches, Recent Head Injury,    Nose Bleeding, Neck Stiffness, Thyroid Mass, Hearing Loss, Ear Fullness, Dry     Mouth, Nasal or Sinus Pain, Dry Lips, Nasal discharge, Nasal congestion, Other      Cardiovascular:  Denies: Palpitations, Syncope, Claudication, Chest Pain, Wake     up Gasping for air, Leg Swelling, Irregular Heart Rate, Cyanosis, Dyspnea on     Exertion, Other      Gastrointestinal:  Denies: Nausea, Constipation, Diarrhea, Abdominal pain,     Vomiting, Difficulty Swallowing, Reflux/Heartburn, Dysphagia,  Jaundice,     Bloating, Melena, Bloody stools, Other      Genitourinary:  Denies: Urinary frequency, Incontinence, Hematuria, Urgency,     Nocturia, Dysuria, Testicular problems, Other      Musculoskeletal:  Denies: Joint Pain, Joint Stiffness, Joint Swelling, Myalgias,    Other      Hematologic/lymphatic:  DENIES: Lymphadenopathy, Bruising, Bleeding tendencies,     Other      Neurological:  Denies: Headache, Numbness, Weakness, Seizures, Other      Psychiatric:  Denies: Anxiety, Appropriate Effect, Depression, Other      Sleep:  No: Excessive daytime sleep, Morning Headache?, Snoring, Insomnia?, Stop    breathing at sleep?, Other      Integumentary:  Denies: Rash, Dry skin, Skin Warm to Touch, Other      Immunologic/Allergic:  Denies: Latex allergy, Seasonal allergies, Asthma,     Urticaria, Eczema, Other      Immunization status:  No: Up to date            FAMILY/SOCIAL/MEDICAL HX      Surgical History:  Yes: Appendectomy (AGE 12YR), Bowel Surgery (COLONOSCOPY),     Kidney Surgery (Kidney stones), Oral Surgery (TEETH REMOVED); No: AAA Repair,     Abdominal Surgery, Angioplasty, Back Surgery, Bladder Surgery, Breast Surgery,     CABG, Carotid Stenosis, Cholecystectomy, Ear Surgery, Eye Surgery, Head Surgery,    Hernia Surgery, Nose Surgery, Orthopedic Surgery, Prostatectomy, Rectal Surgery,    Spinal Surgery, Testicular Surgery, Throat Surgery, Valve Replacement, Vascular     Surgery, Other Surgeries      Stroke - Family Hx:  Mother      Diabetes - Family Hx:  Brother      Cancer/Type - Family Hx:  Father      Other Family Medical History:  Mother      Is Father Still Living?:  No      Is Mother Still Living?:  Yes       Family History:  Yes      Social History:  No Tobacco Use, No Alcohol Use, No Recreational Drug use      Smoking status:  Former smoker (1 ppd x 25 years, quit 2015)      Anticoagulation Therapy:  No      Antibiotic Prophylaxis:  No      Medical History:  Yes: Arthritis, Blood Disease  "(\"POLYMYACITIS.\"), High Blood     Pressure (ON MEDS), Kidney Stones (\"19 YEARS AGO, AND PASSED ANOTHER ONE HERE IN    E.D. 2-3 YRS AGO.\"), Reflux Disease (ON MEDICATION); No: Alcoholism, Allergies,     Anemia, Asthma, Broken Bones, Cataracts, Chemical Dependency,     Chemotherapy/Cancer, Chronic Bronchitis/COPD, Emphysema, Chronic Liver Disease,     Colon Trouble, Colitis, Diverticulitis, Congestive Heart Failu, Deafness or     Ringing Ears, Convulsions, Depression, Anxiety, Bipolar Disorder, Diabetes,     Epilepsy, Seizures, Forgetfullness, Glaucoma, Gall Stones, Gout, Head Injury,     Heart Attack, Heart Murmur, Hemorrhoids/Rectal Prob, Hepatitis, Hiatal Hernia,     High Cholesterol, HIV (Do not ask - volu, Jaundice, Kidney or Bladder Disease,     Migrane Headaches, Mitral Valve Prolapse, Night sweats, Phlebitis, Psychiatric     Care, Rheumatic Fever, Sexually Transmitted Dis, Shortness Of Breath, Sinus     Trouble, Skin Disease/Psoriais/Ecz, Stroke, Thyroid Problem, Tuberculosis or Pos    TB Te, Miscellaneous Medical/oth      Psychiatric History      none            PREVENTION      Hx Influenza Vaccination:  Yes      Date Influenza Vaccine Given:  Sep 1, 2019      Influenza Vaccine Declined:  No      2 or More Falls Past Year?:  No      Fall Past Year with Injury?:  No      Hx Pneumococcal Vaccination:  Yes      Encouraged to follow-up with:  PCP regarding preventative exams.      Chart initiated by      dean pan ma            ALLERGIES/MEDICATIONS      Allergies:        Coded Allergies:             SULFA (SULFONAMIDE ANTIBIOTICS) (Verified  Allergy, Unknown, 10/17/19)           VALDECOXIB (Verified  Allergy, Unknown, 10/17/19)      Medications    Last Reconciled on 6/26/19 11:17 by MICHAEL MARTINEZ MD      Furosemide* (Lasix*) 40 Mg Tablet      40 MG PO QDAY, #30 TAB 0 Refills         Reported         10/17/19       Omeprazole (Omeprazole*) 40 Mg Capsule      40 MG PO BID, #60 CAP 9 Refills         Prov: " Louis Geronimo         2/28/18       Multivitamins (Multi-Vitamin) 1 Each Tablet      1 TAB PO QDAY, #30 TAB 0 Refills         Reported         2/12/18       Aspirin Chew (Aspirin Baby) 81 Mg Tab.chew      81 MG PO QDAY, #30 TAB.CHEW 0 Refills         Reported         2/12/18       Naproxen (Naprosyn EC) 375 Mg Tablet.dr      375 MG PO QDAY, TAB         Reported         1/9/18       Hydroxychloroquine Sulfate (Plaquenil*) 200 Mg Tab      200 MG PO QDAY, TAB         Reported         1/9/18       Lisinopril* (Lisinopril*) 20 Mg Tablet      20 MG PO HS, #30 TAB 0 Refills         Reported         1/9/18       predniSONE* (predniSONE*) 2.5 Mg Tablet      2.5 MG PO QDAY, TAB         Reported         1/9/18      Current Medications      Current Medications Reviewed 10/17/19            EXAM      CONSTITUTIONAL: Pleasant male in no acute distress, normal conversant.       EYES : Pink conjunctive, no ptosis, PERRL.       ENMT : Nose and ears appear normal, normal dentition, mild posterior pharyngeal     wall erythema. Mallampati classification II, no sinus tenderness.       Neck: Nontender, no masses, no thyromegaly, no nodules.      Resp : Bilateral diminished breath sounds, resonant to percussion bilaterally,     no wheezing, crackles or rhonchi.      CVS  : No carotid bruits, s1s2 nl, RRR, no murmur, rubs or gallop, 1-2+ pedal     edema bilaterally lower extremities, nontender to palpation.        Chest wall: Normal rise with inspiration, nontender on palpation      GI   : Abdomen soft, with no masses, no hepatosplenomegaly, no hernias, BS+      MSK  : Normal gait and station, no digital cyanosis or clubbing       Skin : No rashes, ulcerations or lesions, normal turgor and temperature      Neuro: CN II - XII intact, no sensory deficits, DTRs intact and symmetrical, no     motor weakness      Psych: Appropriate affect, A   Vtials      Vitals:             Height 5 ft 8 in / 172.72 cm           Weight 254 lbs 4 oz / 115.055888  kg           BSA 2.26 m2           BMI 38.7 kg/m2           Temperature 98.1 F / 36.72 C - Oral           Pulse 99           Respirations 16           Blood Pressure 134/84 Sitting, Right Arm           Pulse Oximetry 90%, room air            REVIEW      Results Reviewed      PCCS Results Reviewed?:  Yes Prev Lab Results, Yes Prev Radiology Results, Yes     Previous Mecial Records      Lab Results      Patient had echocardiogram on 10/08/2019 at [facility]. This showed     pulmonary hypertension with severe tricuspid regurgitation. Right ventricular     systolic pressure was more than 60 mmHg. Right ventricle and right atrium were     moderately dilated. The right ventricle was severely dilated. Right ventricle     systolic function was severely reduced. Left ventricular EF was 45-50%.              Patient: BRAXTON MCGHEE JR   Acct: #[id number]   Report: #[id number]            UNIT #: [id number]    DOS:       LOCATION:[facility]     : 1965            PROVIDERS      ADMITTING:     FAMILY:  NAVEED ELIZABETH         OTHER:       DICTATING:  Lloyd Neri MD            REASON FOR VISIT:  SOA            *******Signed******                                    Psychiatric                          [facility]                            [place], [place]  [address]               __________________________________________________________________________             Patient Name:                   Attending Physician:      Braxton Mcghee Jr NAVIN KAINI, M.D.             Patient Visit # MR #            Admit Date  Disch Date     Location      [id number]    [id number]      2018                 [facility]- -             Date of Birth      1965      __________________________________________________________________________      0835 - DIAGNOSTIC REPORT             2-D AND M-MODE ECHOCARDIOGRAM REPORT             DATE:  2018              INDICATION:                                    NORMAL RANGE                TEST RESULTS      ______________________________________________________________________                                                   Systolic       Diastolic      RVID                    0.7-2.4      LVID                    3.7-5.4      POST. WALL THICKNESS    0.8-1.1      SEPTAL WALL THICKNESS   0.7-1.2      LAID                    1.9-3.8      AORTIC ROOT DIMENSION   2.0-3.7      MTRL. VLV. GOPAL. D.D.R. 80mm/sec-150mm/sec      ______________________________________________________________________             COMMENTS:  This was a good echocardiographic study and was obtained while the      patient had sinus rhythm.  The following adequate data was obtained.             1.  The left ventricle was normal in size.  The systolic function was normal.          The estimated left ventricular ejection fraction was 60%.  No apparent          segmental wall motion abnormalities.  Mild concentric left ventricular          hypertrophy was seen.      2.  The left atrium was at the upper limits of normal size.      3.  The right atrium was normal in size.      4.  The right ventricle was mildly dilated.      5.  The aortic root was normal in motion and dimension.      6.  Mitral valve excursion was normal. There was trace mitral regurgitation.      7.  Pulmonic valve was not well seen.  There was nonsignificant pulmonic          insufficiency.      8.  The aortic valve cusps were not well see and there was nonsignificant          aortic insufficiency.  Doppler study demonstrated no stenosis.      9.  There was no tricuspid regurgitation.      10. There was no pericardial effusion.      11. There were no apparent intracardiac masses, vegetations, or thrombi.      12. Mitral inflow studies by Doppler demonstrated E/A ratio 1.3, mitral valve          deceleration time was 162 msec, IVRT was 90 msec, and E/E' was 6.0.          Average LA volume index  was 23.1 mL/m2.  IVC was 2.3 cm.             CONCLUSIONS:      1.  Normal left ventricular systolic and diastolic function.      2.  Mild left ventricular hypertrophy.      3.  Mild right ventricular dilatation.      4.  Nonsignificant aortic insufficiency.      5.  Nonsignificant pulmonic insufficiency.             To be electronically signed in [employer]      [id number] LLOYD NERI M.D.             RE:rt      D:  2018 10:22      T:  2018 12:18      #[id number]             Until signed, this is an unconfirmed preliminary report that may contain      errors and is subject to change.                   Until signed, this is an unconfirmed preliminary report that may contain      errors and is subject to change.                     <Electronically signed by Lloyd Neri MD>                12/10/18 1029               Lloyd Neri MD                                                                  ELSRE:COY      D:18 1022      Radiographic Results               [facility]                PACS RADIOLOGY REPORT            Patient: FREDERICK KNOWLES JR   Acct: #[id number]   Report: #[id number]            UNIT #: [id number]    DOS: 19       INSURANCE:[employer] - PPO   ORDER #:[id number]      LOCATION:[facility]     : 1965            PROVIDERS      ADMITTING:     ATTENDING: Tenisha Clayton      FAMILY:  NAVEED ELIZABETH   ORDERING:  Tenisha Clayton         OTHER:    DICTATING:  Andrew Rodriguez MD            REQ #:[id number]   EXAM:CHW - CT CHEST with CONTRAST      REASON FOR EXAM:  LEG SWELLING BILATERAL      REASON FOR VISIT:  LEG SWELLING BILATERAL            *******Signed******         PROCEDURE:   CT CHEST W/ CONTRAST             COMPARISON:   Other, CT, CHEST W/O CONTRAST, 2016, 13:38.  [facility], PET, SKULL       BASE TO MID THIGH INITIAL, 2018, 9:33.  [facility], CT, CHEST    W/O  CONTRAST,       12/04/2018, 13:35.  [facility], CT, CHEST W/O CONTRAST,     6/10/2019, 9:47.             INDICATIONS:   SHORTNESS OF AIR UPON EXERTION, COUGH, BILATERAL LEG SWELLING.      EVALUATE FOR PULMONARY       EMBOLISM.             TECHNIQUE:   After obtaining the patient's consent, CT images were obtained with    non-ionic       intravenous contrast material.               PROTOCOL:     Pulmonary embolism imaging protocol performed                RADIATION:     DLP: 788mGy*cm          Automated exposure control was utilized to minimize radiation dose.       CONTRAST:   100cc Isovue 370 I.V.      LABS:     eGFR: >60ml/min/1.73m2             FINDINGS:         There are no intraluminal filling defects to suggest pulmonary embolus.  There     is mild       cardiomegaly.  There is hypoventilation.  There is a pretracheal retrocaval     lymph node measuring       2.2 cm in dimension which is slightly larger than on the previous study.  There     is a lymph node       left Janette aortic arch measuring 1.6 x 0.9 cm in dimension which is unchanged.      There is a right       paratracheal lymph node seen on axial image number 85 with a maximum dimension     of 1.7 cm which       previously measured 1.3 cm.  There is no hilar adenopathy.  Incidental note is     made of some reflux       of contrast from the right atrium into the hepatic segment of the inferior vena     cava and the       intrahepatic venous system which may reflect changes of right heart failure.      There is dependent       prominence of the subpleural and peripheral axial pulmonary interstitium with     very minimal       ground-glass opacification.  Non dependent pulmonary interstitium shows no     thickening.               Centered on axial image number 38 is a 1.6 cm soft tissue nodule laterally in     the costophrenic       sulcus on the right which is stable dating back to 12/30/2016.  There is an     ill-defined 1 cm  nodule       in the costophrenic sulcus on the right anteriorly which is also stable in size.     There is a       pleural base nodule laterally in the right lung base in the mid axillary line     1.3 cm in dimension       which is not felt to have significantly changed from 12/30/2016 suggesting these    reflect benign       process.  There is a 2 cm mass involving the left adrenal gland which is stable     from 12/30/2016       most likely reflecting an adrenal adenoma.  There is a probable stable 2.3 cm     incompletely       evaluated right renal cyst.             CONCLUSION:                1. No evidence of pulmonary embolus.             2. The 3 pleural-based masses inferiorly and laterally and anteriorly in the     right lung are stable       dating back to 12/30/2016 confirming a benign etiology.  Mediastinal adenopathy     as described above       has slightly increased from the study of 12/30/2016 and may be post reactive in     nature.  In       addition, there are additional pleural-based masslike lesions paraspinal     bilaterally and       inferiorly.  One on the right measures 2.7 cm in dimension and 1 on the left     measures 1.7 cm in       dimension and are also stable from 12/30/2016 suggesting a benign etiology.             3. Cardiomegaly.  In addition, there is some reflux of contrast into the liver     which could reflect       changes of some heart failure.  In addition, there are dependent areas of     interstitial prominence       and mild ground-glass opacification in the lung bases.  The changes in the lungs    could be related       to hypoventilation or given the other findings could reflect changes of mild     edema.                EDGARDO BEEBE MD             Electronically Signed and Approved By: EDGARDO BEEBE MD on 9/30/2019 at 16:42                           Until signed, this is an unconfirmed preliminary report that may contain      errors and is subject to change.                                               SCHJ1:      D:09/30/19 1642      PFT Results      [id number]  [id number]                                 [facility]                          [facility]                            [place]  [id number]               __________________________________________________________________________             Patient Name:                   Attending Physician:      Braxton Mcghee Jr NAVIN KAINI, M.D.             Patient Visit # MR #            Admit Date  Disch Date     Location      [id number]    [id number]      01/25/2018                 CT- -             Date of Birth      1965      __________________________________________________________________________      0821 - DIAGNOSTIC REPORT             PULMONARY FUNCTION TEST             SPIROMETRY:      Spirometry shows possible severe restrictive defect.      FEV1/FVC ratio is 74%.      FEV1 is 1.27 L, 35% of predicted.      FVC is 1.71 L, 37% of predicted.             BRONCHODILATOR RESPONSE:      There is a significant response to bronchodilator administration.      FEV1 increased from 1.27 L to 1.50 L, 18% change.      FVC increased from 1.71 L to 1.98 L, 16% change.             LUNG VOLUMES:      Lung volumes confirmed restriction.      Total lung capacity is 3.33 L, 50% of predicted.      Residual volume is 1.52 L, 74% of predicted.             DIFFUSION:      Diffusion capacity is mildly decreased, 66% of predicted.             FLOW VOLUME LOOP:      Flow volume loop compatible with restrictive process.             CONCLUSION:      Normal FEV1/FVC with low FEV1 and FVC, with low lung capacity and lung      volumes with low diffusion capacity.  It suggests severe restrictive airway      disease, likely mixture of body habitus and pulmonary parenchymal disease.      There is significant bronchial reversibility seen as well.  Please correlate       clinically.             To be electronically signed in [employer]      [id number] LOUIS GERONIMO M.D.             NK:rt      D:  02/08/2018 12:47      T:  02/08/2018 12:54      #[id number]             Until signed, this is an unconfirmed preliminary report that may contain      errors and is subject to change.                   02/12/18 1637  <Electronically signed by Louis Geronimo MD>            Assessment      Pulmonary HTN - I27.20            CHF (congestive heart failure) - I50.9            Notes      New Medications      * Furosemide* (Lasix*) 40 MG TABLET: 40 MG PO QDAY #30      * Furosemide 40 MG TABLET: 40 MG PO BID@09,17 #60      Discontinued Medications      * predniSONE* (Deltasone*) 10 MG TABLET: 10 MG PO QDAY #120      New Diagnostics      * Echo Complete, 2 Months         Dx: Pulmonary HTN - I27.20      * Probrain Natriuretic, Routine         Dx: CHF (congestive heart failure) - I50.9      * Comp Metabolic Panel, 1 DAY         Dx: CHF (congestive heart failure) - I50.9      * Renal Function Panel, 2 Week         Dx: CHF (congestive heart failure) - I50.9      * Probrain Natriuretic, 2 Week         Dx: Pulmonary HTN - I27.20      New Office Procedures      * Flu Vacc Fluarix Quadrivalent, As Soon As Possible         Flu Vacc [id number](6Mos Up)/Pf (Fluarix Quadrivalent [id number] Syringe) 60        MCG/0.5 ML SYRINGE: 60 MICROGRAM INTRAMUSCULARLY Qty 1 SYRINGE      New Referrals      * Cardiology, SCHEDULED PROCEDURE         Dx: Pulmonary HTN - I27.20      PLAN:      The patient is a 54 year old male with a history of smoking in the past, chronic    persistent cough with nodular opacities bilaterally concerning for chronic     indolent infection versus malignancy. 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   He recently     had right heart failure with pulmonary hypertension, likely related to his left     heart disease as well as sleep apnea.               1.  Pulmonary hypertension. Echocardiogram from 2018 did not show significant     pulmonary hypertension, but recent echocardiogram showed severe pulmonary      hypertension with slightly reduced worsening EF on the left side as well. I will    have him referred to cardiology service. He wants to see Dr. Joe Mendoza at [facility]. He may need right and left heart catheterization. Continue Lasix    at 40 mg once daily. I will check CBC, CMP and anti-proBNP now.  If anti-proBNP     is elevated, he might need increased dose of Lasix.  If he holds off on any     cardiac evaluation at this time, he will need repeat echocardiogram in two     months.              2. Obstructive sleep apnea and obesity hypoventilation syndrome.  Continue with     BiPAP 21/13.             3. Lung nodules overall are stable. Continue with Plaquenil and prednisone per     Dr. Clayton.              4. Continue with Arnuity.  He has not needed any prednisone burst since last     office visit.            5. I have ordered echocardiogram in two months.            6. I will give him flu vaccine today.            7. Follow up with me in two months, earlier if needed.            ADDENDUM:  I reviewed blood work from today, he still has a high anti-proBNP and    has normal renal function and serum potassium was 4.6. I will prescribe Lasix 40    mg twice a day and check renal profile in two weeks.            Electronically signed by Louis Geronimo  10/30/2019 11:42       Disclaimer: Converted document may not contain table formatting or lab diagrams. Please see [employer] for the authenticated document.

## 2021-05-28 NOTE — PROGRESS NOTES
Patient: BRAXTON MCGHEE JR     Acct: [id number]     Report: #[id number]  UNIT #: [id number]     : 1965    Encounter Date:2020  PRIMARY CARE: NAVEED ELIZABETH  ***Signed***  --------------------------------------------------------------------------------------------------------------------  TELEHEALTH NOTE      History of Present Illness      Chief Complaint: (3 month follow up/soa)            Braxton Mcghee Jr is presenting for evaluation via Telehealth visit by     phone. Verbal consent obtained before beginning visit.            Provider spent 23 minutes with the patient during telehealth visit.            The following staff were present during the visit: (Louis Geronimo Md,dean pan ma)            The patient is a 55 year old male with severe pulmonary hypertension, severe     sleep apnea, severe obstructive and restrictive airway disease, history of     systemic lupus and inflammatory myositis. He has history of right heart failure     and morbid obesity  who presents for Telehealth visit today. Since his last     office visit I ordered an echocardiogram to be repeated. I reviewed the     echocardiogram with him today. Echocardiogram continues to show severe pulmonary    hypertension with enlarged right atrium and right ventricle. There is abnormal     motion of the septum suggestive of right ventricular overload. His left ventri    cular systolic function was called adequate with ejection fraction of 55%. There    was concentric left ventricular hypertrophy. The patient gained around 5-10     pounds recently and increased his dose of Lasix to 40 mg three times a day.  He     continues to be on BiPAP . I had given him qvar but it did not help very     much. Other inhalers like trelegy ellipta inhaler did not help and he had     jitteriness with it. His friend was on the call and I discussed with both of     them. He passed out twice over the last 2 months and it was  associated with     severe coughing fits. He did not have any chest pain or chest tightness but he     has been gaining weight. His friend thought it was mostly related to having low     oxygen at that time. He has no significant chest discomfort, he tries to keep     himself as active as possible. His oxygen remains around mid to high 90s for the    most part. He uses his CPAP machine and it helps.             Physical exam is deferred due to Telehealth visit. He has some swelling of the     legs per his report.                           Past Med History               [facility]                PACS RADIOLOGY REPORT            Patient: FREDERICK KNOWLES JR   Acct: #[id number]   Report: #[id number]            UNIT #: [id number]    DOS: 20 0850      INSURANCE:[employer] - O   ORDER #:[id number]      LOCATION:NM     : 1965            PROVIDERS      ADMITTING:     ATTENDING: Louis Geronimo      FAMILY:  NAVEED ELIZABETH   ORDERING:  Louis Geronimo         OTHER:    DICTATING:  Sabas Bridges MD            REQ #:[id number]   EXAM:CXR2 - CHEST 2V AP PA LAT      REASON FOR EXAM:  COPD      REASON FOR VISIT:  COPD            *******Signed******         PROCEDURE:   CHEST AP/PA AND LATERAL             COMPARISON:   [facility], CT, CHEST W/O CONTRAST,     2019, 8:09.  [facility], , CHEST PA/AP          INDICATIONS:   COPD             FINDINGS:         Stable heart and mediastinal contour.  The larry are stable and there is no     significant pleural       effusion.  There are slightly low lung volumes.  No acute osseous abnormality.      There could be mild       emphysematous changes.  Pleural-based densities better seen on the recent CT     from 2019.             CONCLUSION:         1. No definite acute disease.  Bilateral pleural based nodules better seen on     recent CT scan.   Mild       chronic interstitial changes and low lung volumes.              CHARITY AUGUSTINE MD             Electronically Signed and Approved By: CHARITY AUGUSTINE MD on 2020 at 9:52                           Until signed, this is an unconfirmed preliminary report that may contain      errors and is subject to change.                                              SCHJO:      D:20 0942            Patient: BENSONBRAXTON ARMENDARIZ JR   Acct: #[id number]   Report: #[id number]            UNIT #: [id number]    DOS:       LOCATION:[facility]     : 1965            PROVIDERS      ADMITTING:     FAMILY:  MD NONE         OTHER:       DICTATING:  Franck Richards MD            REASON FOR VISIT:  PULM HTN            *******Signed******                                    [facility]                          Health Information Management Services                            [place]  [address]               __________________________________________________________________________             Patient Name:                   Attending Physician:      Braxton Mcghee Jr NAVIN KAINI, M.D.             Patient Visit # MR #            Admit Date  Disch Date     Location      [id number]    [id number]      2020                 [facility]- -             Date of Birth      1965      __________________________________________________________________________      835 - DIAGNOSTIC REPORT             2-D AND M-MODE ECHOCARDIOGRAM REPORT             DATE:      2020             INDICATION:                                    NORMAL RANGE           TEST RESULTS      _____________________________________________________________________                                                     Systolic     Diastolic      RVID                      0.7-2.4      LVID                      3.7-5.4           2.5         3.5      POST. WALL THICKNESS      0.8-1.1                       1.5       SEPTAL WALL THICKNESS     0.7-1.2                       1.4      LAID                      1.9-3.8                       4.1      AORTIC ROOT DIMENSION     2.0-3.7                       4.2      MTRL. VLV. GOPAL. D.D.R. 80mm/sec-150mm/sec      _____________________________________________________________________             COMMENTS:  The patient underwent 2-D, M-Mode, and Doppler echocardiogram.      The study was technically limited.  The following was observed.             1.  MITRAL VALVE:       Fibrocalcific.      2.  AORTIC VALVE:       Fibrotic.      3.  TRICUSPID VALVE:    Normal.      4.  PULMONIC VALVE:     Not well visualized.      5.  AORTIC ROOT:        Enlarged.      6.  LEFT ATRIUM:        Enlarged.      7.  LEFT VENTRICLE:     Normal in size. There is concentric left ventricular          hypertrophy. Overall left ventricular systolic function is adequate,          ejection fraction around 55%.      8.  RIGHT VENTRICLE:    Enlarged. There is abnormal motion of the septum          suggestive of right ventricular overload.      9.  RIGHT ATRIUM:       Slightly enlarged.      10. PERICARDIUM:        Unremarkable.             Doppler examination shows trace aortic regurgitation. Mild tricuspid      regurgitation. Estimated pulmonary artery systolic pressure by Doppler 160 mm      suggestive of severe pulmonary hypertension.             CONCLUSIONS:      1. Technically limited study.      2. Adequate left ventricular systolic function with wall motion abnormalities         and underlying left ventricular hypertrophy.      3. Trace aortic regurgitation.      4. Trace mitral regurgitation.      5. Mild tricuspid regurgitation.      6. Severe pulmonary hypertension.      7. Enlarged right atrium and right ventricle.             I discussed the case with Dr. Louis Geronimo.             To be electronically signed in [employer]      [id number] ROOSEVELT RAM M.D.             DANNY:[email or web address]      D:  03/04/2020 10:30      T:   2020 10:55      #[id number]             Until signed, this is an unconfirmed preliminary report that may contain      errors and is subject to change.                   Until signed, this is an unconfirmed preliminary report that may contain      errors and is subject to change.                     <Electronically signed by Franck Richards MD>                20 0930               Franck Richards MD                                                                  GIRMA:[facility]      D:20 1030            Patient: BRAXTON MCGHEE JR   Acct: #[id number]   Report: #[id number]            UNIT #: [id number]    DOS:       LOCATION:NM     : 1965            PROVIDERS      ADMITTING:     FAMILY:  NAVEED ELIZABETH         OTHER:       DICTATING:  Louis Geronimo MD            REASON FOR VISIT:  COPD            *******Signed******                                    [facility]                          Health Information Management Services                            [place]  [address]               __________________________________________________________________________             Patient Name:                   Attending Physician:      Braxton Mcghee Jr NAVIN KAINI, M.D.             Patient Visit # MR #            Admit Date  Disch Date     Location      [id number]    [id number]      2020                 [facility]- -             Date of Birth      1965      __________________________________________________________________________      0821 - DIAGNOSTIC REPORT             PULMONARY FUNCTION TEST             SPIROMETRY:      1.  Spirometry shows severe restrictive defect.      2.  FEV1/FVC is 74%.      3.  FEV1 is 1.62 L, 46% of predicted.      4.  FVC is 2.20 L, 48% of predicted.             BRONCHODILATOR RESPONSE:      1.  There is a significant response to bronchodilator administration.      2.  FEV1 increased from 1.62 L to 1.82 L, 12%  change.      3.  FVC increased from 2.20 L to 2.72 L, 3% change.             LUNG VOLUMES:      1.  Lung volumes confirm restriction.      2.  Total lung capacity is 3.71 L, 56% of predicted.      3.  Residual volume is 1.44 L, 69% of predicted.             DIFFUSION:      Diffusion capacity is mildly decreased, 68% of predicted.             FLOW VOLUME LOOP:      Flow volume loop is compatible with a restrictive process.             COMPARISON:      Compared to pulmonary function test from 01/25/2018:      1.  Spirometry parameter shows improvement with FEV1 increased from 1.27 L to          1.62 L, 27% change.      2.  FVC increased from 1.71 L to 2.20 L, 28% change.      3.  Diffusion capacity has remained stable.      4.  Total lung capacity has increased from 3.33 L to 3.71 L, 11% change.      5.  Residual volume has decreased from 1.52 L to 1.44, 6% change.             CONCLUSION:      Normal FEV1/FVC with low FEV1 and FVC, with low lung volumes and low      diffusion capacity suggests severe restrictive airway disease, likely      pneumonitis or interstitial lung disease.  Underlying bronchial reversibility      suggests secondary reactive airway disease component as well.  Please      correlate clinically.             Overall his lung function parameters have improved over the last year.             To be electronically signed in [employer]      [id number] LOUIS GERONIMO M.D.             NK:rt      D:  01/29/2020 08:31      T:  01/29/2020 09:02      #[id number]             Until signed, this is an unconfirmed preliminary report that may contain      errors and is subject to change.                   Until signed, this is an unconfirmed preliminary report that may contain      errors and is subject to change.                     <Electronically signed by Louis Geronimo MD>                02/03/20 1238               Louis Geronimo MD:COY      D:01/29/20  0831      Overview of Symptoms      soa,cough,wheeze            Allergies/Medications      Allergies:        Coded Allergies:             SULFA (SULFONAMIDE ANTIBIOTICS) (Verified  Allergy, Unknown, 5/20/20)           VALDECOXIB (Verified  Allergy, Unknown, 5/20/20)      Medications    Last Reconciled on 5/20/20 13:46 by LOUIS GERONIMO MD      metOLazone (metOLazone) 5 Mg Tablet      10 MG PO QDAY, #60 TAB 1 Refill         Prov: Louis Geronimo         5/20/20       Furosemide* (Lasix*) 40 Mg Tablet      40 MG PO TID, #90 TAB 0 Refills         Reported         5/20/20       Beclomethasone Dipropionate (Qvar 80 Redihaler 10.6 GM) 10.6 Gm Hfa.aeroba      1 PUFF INH RTBID, #1 INH 9 Refills         Prov: Louis Geronimo         2/3/20       Fluticasone/Vilanterol 100-25 Mcg Inh (Breo Ellipta 100-25 Mcg Inh) 1 Each     Blst.w.dev      1 PUFF INH QDAY for 30 Days, #1 MDI 3 Refills         Prov: Louis Geronimo         1/9/20       MDI-Albuterol (Ventolin HFA) 8 Gm Hfa.aer.ad      2 PUFFS INH Q4-6H PRN for DYSPNEA, #1 INH 6 Refills         Prov: Louis Geronimo         12/31/19       Omeprazole (Omeprazole*) 40 Mg Capsule      40 MG PO BID, #60 CAP 9 Refills         Prov: Louis Geronimo         2/28/18       Multivitamins (Multi-Vitamin) 1 Each Tablet      1 TAB PO QDAY, #30 TAB 0 Refills         Reported         2/12/18       Aspirin Chew (Aspirin Baby) 81 Mg Tab.chew      81 MG PO QDAY, #30 TAB.CHEW 0 Refills         Reported         2/12/18       Hydroxychloroquine Sulfate (Plaquenil) 200 Mg Tab      200 MG PO QDAY, TAB         Reported         1/9/18       Lisinopril* (Lisinopril*) 20 Mg Tablet      20 MG PO HS, #30 TAB 0 Refills         Reported         1/9/18       predniSONE (predniSONE) 2.5 Mg Tablet      2.5 MG PO QDAY, TAB         Reported         1/9/18            Plan/Instructions      Ambulatory Assessment/Plan:        Notes      New Medications      * Furosemide* (Lasix*) 40 MG TABLET: 40 MG PO TID #90      * metOLazone 5 MG  TABLET: 10 MG PO QDAY #60      Discontinued Medications      * Furosemide* (Lasix*) 40 MG TABLET: 40 MG PO BID@09,17 #60      * Beclomethasone Dipropionate (Qvar 80 Redihaler 10.6 GM) 10.6 GM HFA.AEROBA          Sample - Qty 1         Instructions: 1 puff bid         Dx: Pulmonary hypertension - I27.20      Plan/Instructions      * Plan Of Care: ()            * Chronic conditions reviewed and taken into consideration for today's treatment       plan.      * Patient instructed to seek medical attention urgently for new or worsening       symptoms.      * Patient was educated/instructed on their diagnosis, treatment and medications       prior to discharge from the clinic today.            PLAN:      The patient is a 55 year old male with history of smoking in the past,  chronic     persistent cough with nodular opacity bilaterally concerning for chronic ind    olent infection versus malignancy. He has extensive work up including     bronchoscopy and biopsy all of which were negative. He has lung nodules and     connective tissue disease with possible inflammatory myositis and systemic     lupus.  His echocardiogram shows severe pulmonary hypertension with right     ventricular volume and pressure overload.             1. Pulmonary hypertension with volume overload. He has class 2 and class 3     component of pulmonary hypertension as well as sleep apnea. His echocardiogram     from 2018 did not show significant pulmonary hypertension but recent     echocardiogram showed severe pulmonary hypertension with slightly reduced ejec    tion fraction on the left side. He has been seeing Dr. Joe Mendoza at [facility] for cardiology.  He continues to be on Lasix 40 mg, we increased it to 40     mg twice daily and he is currently using it three times a day. I will add     metolazone 10 mg daily for now and have him follow up in 1-2 weeks. I will check     a renal panel when he comes back. He had a right heart  catheterization in     December 2019 done at an outside facility. It showed a PA pressure mean of 54,     his hemodynamic showed a pulmonary pressure of 83/37 with mean pressure of 54.     Pulmonary capillary wedge pressure was 15, cardiac index was 2.55. It also     showed mild diastolic dysfunction and left heart with ejection fraction of 45%.     Given that his mean PA pressure is very high and overall he is symptomatic with     volume overload, I will consider having him evaluated at pulmonary hypertension     clinic.        2. Continue with Lasix 40 mg twice daily and add metolazone 10 mg once daily.       3. Continue qvar.       4. Obstructive sleep apnea. Continue BiPAP at 21/13 daily. He is adherent to it,     his apnea hypopnea index is 6.9.      5.  Inflammatory myositis and systemic lupus can be causing primary pulmonary     hypertension as well but his pulmonary artery pressure prior to cardiac event     was not as bad based on echocardiogram.       6. Follow up in 1 week and we will consider referral to pulmonary hypertension     then. His syncopal episodes concerning me about volume overload causing     significant right heart failure.      Codes:  Phone Eval 21-30 mi [id number]            Electronically signed by Louis Geronimo  06/06/2020 16:15       Disclaimer: Converted document may not contain table formatting or lab diagrams. Please see [email or web address] for the authenticated document.

## 2021-06-06 NOTE — PROGRESS NOTES
Progress Note      Patient Name: Braxton Mcghee Jr.   Patient ID: [id number]   Sex: Male   YOB: 1965    Primary Care Provider: Jose Adorno MD   Referring Provider: Louis Geronimo MD    Visit Date: May 25, 2021    Provider: Jayce Plummer MD   Location: [facility]   Location Address: [address] [place] [address], [facility]   [place], [place]  [id number]   Location Phone: [phone]          Chief Complaint     Generalized fatigue and lower extremity swelling.       History Of Present Illness  REFERRING CARE PROVIDER: Louis Geronimo MD   Braxton Mcghee Jr. is a 56-year-old male who presents for routine followup today and states he has been feeing well overall, but has developed some mild generalized fatigue and increased bilateral lower extremity edema within the past 6-8 weeks. He does not report any episodes of chest pain/pressure or any palpitations, PND, lightheadedness, or syncope. He does have a history of chronic right-sided congestive heart failure with moderate to severely reduced right ventricular systolic function, as well as severe pulmonary arterial hypertension and severe restrictive lung disease, which is felt to be secondary to pneumonitis versus interstitial lung disease. In addition, he has a history of obstructive sleep apnea and moderate left ventricular dysfunction with an estimated ejection fraction of 40% per his last evaluation in 2020. He remains on chronic anticoagulation with Eliquis for previous a previous episode of paroxysmal atrial flutter. Fortunately, he has not experienced any bleeding problems on this medication. A repeat EKG obtained in the office today showed sinus rhythm with a heart rate of 94 beats per minute, right ventricular hypertrophy, right atrial enlargement, right axis deviation, and a nonspecific ST-T abnormality in the anterior leads. He states he has been taking his chronic diuretic medications as prescribed but has gained approximately 10-15 pounds  "over the past two months.   PAST MEDICAL HISTORY: Severe restrictive lung disease, felt to be secondary to pneumonitis or interstitial lung disease; severe pulmonary arterial hypertension; cor pulmonale with a severely dilated right ventricle and severely reduced right ventricular systolic function per the patient's last echocardiogram at [facility]; mild left ventricular dysfunction with an estimated ejection fraction of 45%; obstructive sleep apnea, on chronic CPAP therapy; essential hypertension; mild nonobstructive coronary artery disease; history of DVT and pulmonary embolism, not currently on chronic anticoagulation.   PSYCHOSOCIAL HISTORY: Moderate alcohol use. Previously smoked but quit.   CURRENT MEDICATIONS: Medications have been reviewed and are as stated.      ALLERGIES:  No known drug allergies.       Review of Systems  · Cardiovascular  o Admits  o : palpitations (fast, fluttering, or skipping beats), swelling (feet, ankles, hands), shortness of breath while walking or lying flat  · Respiratory  o Admits  o : chronic or frequent cough      Vitals  Date Time BP Position Site L\R Cuff Size HR RR TEMP (F) WT  HT  BMI kg/m2 BSA m2 O2 Sat FR L/min FiO2        05/25/2021 11:58 /76 Sitting    93 - R   228lbs 0oz 5'  8\" 34.67 2.23             Physical Examination  · Constitutional  o Appearance  o : Well-developed, well-nourished, alert and oriented, in no acute distress.   · Respiratory  o Auscultation of Lungs  o : Clear to auscultation bilaterally with a prolonged expiratory phase. No wheezing or rales. No tachypnea. Normal effort. No increased work of breathing.  · Cardiovascular  o Heart  o : Regular rate and rhythm. Normal S1 and S2. No S3 gallop. There is a 2/6 systolic murmur at the right lower sternal border. PMI moderately displaced.   · Gastrointestinal  o Abdominal Examination  o : Soft, obese, nondistended, nontender. Normoactive bowel sounds throughout. No masses.   · Skin and " Subcutaneous Tissue  o General Inspection  o : Warm and dry. No rashes, no jaundice. Very mild chronic venous stasis skin changes over the distal lower extremities bilaterally.  · Extremities  o Extremities  o : No cyanosis or clubbing. Trace to 1+ bilateral lower extremity edema. 2+ radial pulses bilaterally.   · EKG  o EKG  o : Obtained in the office today.  o Indications  o : History of atrial flutter and chronic exertional dyspnea.   o Results  o : Sinus rhythm, heart rate of 94 beats per minute, right ventricular hypertrophy with right atrial enlargement, right axis deviation. Old inferior infarct. Nonspecific ST-T abnormality in the anterior leads.   o Comparison  o : Made to prior EKG from 09/11/2020 which showed sinus rhythm with biatrial enlargement right ventricular hypertrophy and nonspecific ST-T changes.          Assessment     1.  Cor pulmonale with chronic right-sided heart failure and moderate to severely reduced right ventricular        systolic function per his last echocardiogram in 2020.   2.  Severe pulmonary arterial hypertension.  3.  Severe restrictive lung disease, felt to be secondary to pneumonitis versus interstitial lung disease.   4.  Paroxysmal atrial flutter, status post successful cardioversion in July 2020. He has not had any symptoms        suggestive of recurrent atrial flutter since that time and EKG in the office today showed sinus rhythm.   5.  History of DVT/PE.  6.  Essential hypertension.  7.  Obstructive sleep apnea, compliant with chronic CPAP therapy.          Plan     1.  Due to his increased swelling and mild weight gain, we will increase his Furosemide dosing schedule to        80 mg three times daily.   2.  Continue Metolazone 5 mg twice daily and potassium supplementation.   3.  We will continue chronic anticoagulation with Eliquis and his other chronic home medications.   4.  His blood pressure remains well controlled today.   5.  He previously failed to tolerate  beta-blockers and declines trial of any other beta-blockers at this time.   6.  I instructed him to call if his swelling does not improve on the increased dosing schedule of Lasix within 1 to        2 weeks. However, if he is feeling better, I will just plan to see him back in the office in 6 months.      Jayce Plummer MD  BAP/pap                Electronically Signed by: Marylou Prince-, Other -Author on May 27, 2021 03:46:15 PM  Electronically Co-signed by: Jayce Plummer MD -Reviewer on May 31, 2021 08:37:43 AM

## 2021-07-15 VITALS
SYSTOLIC BLOOD PRESSURE: 122 MMHG | HEIGHT: 68 IN | HEART RATE: 93 BPM | BODY MASS INDEX: 34.56 KG/M2 | WEIGHT: 228 LBS | DIASTOLIC BLOOD PRESSURE: 76 MMHG

## 2021-08-12 ENCOUNTER — OFFICE VISIT (OUTPATIENT)
Dept: PULMONOLOGY | Facility: CLINIC | Age: 56
End: 2021-08-12

## 2021-08-12 ENCOUNTER — HOSPITAL ENCOUNTER (OUTPATIENT)
Dept: GENERAL RADIOLOGY | Facility: HOSPITAL | Age: 56
Discharge: HOME | End: 2021-08-12
Admitting: INTERNAL MEDICINE

## 2021-08-12 VITALS
RESPIRATION RATE: 14 BRPM | WEIGHT: 230 LBS | DIASTOLIC BLOOD PRESSURE: 88 MMHG | BODY MASS INDEX: 34.86 KG/M2 | HEIGHT: 68 IN | OXYGEN SATURATION: 92 % | HEART RATE: 91 BPM | SYSTOLIC BLOOD PRESSURE: 120 MMHG

## 2021-08-12 DIAGNOSIS — J84.9 ILD (INTERSTITIAL LUNG DISEASE) (HCC): ICD-10-CM

## 2021-08-12 DIAGNOSIS — J96.12 CHRONIC RESPIRATORY FAILURE WITH HYPERCAPNIA (HCC): ICD-10-CM

## 2021-08-12 DIAGNOSIS — I27.20 PULMONARY HTN (HCC): ICD-10-CM

## 2021-08-12 DIAGNOSIS — M35.9 UNDIFFERENTIATED CONNECTIVE TISSUE DISEASE (HCC): ICD-10-CM

## 2021-08-12 DIAGNOSIS — M33.20 POLYMYOSITIS (HCC): ICD-10-CM

## 2021-08-12 DIAGNOSIS — M33.20 POLYMYOSITIS (HCC): Primary | ICD-10-CM

## 2021-08-12 PROCEDURE — 71046 X-RAY EXAM CHEST 2 VIEWS: CPT

## 2021-08-12 PROCEDURE — 99214 OFFICE O/P EST MOD 30 MIN: CPT | Performed by: INTERNAL MEDICINE

## 2021-08-12 RX ORDER — HYDROXYCHLOROQUINE SULFATE 200 MG/1
200 TABLET, FILM COATED ORAL DAILY
COMMUNITY
Start: 2021-08-12 | End: 2022-08-12

## 2021-08-12 RX ORDER — FUROSEMIDE 80 MG
TABLET ORAL
COMMUNITY
Start: 2021-03-02 | End: 2022-05-20

## 2021-08-12 RX ORDER — ZOLPIDEM TARTRATE 5 MG/1
5 TABLET ORAL NIGHTLY PRN
COMMUNITY
Start: 2021-06-08 | End: 2021-09-22

## 2021-08-12 RX ORDER — METOLAZONE 5 MG/1
5 TABLET ORAL 2 TIMES DAILY
COMMUNITY
End: 2021-12-10 | Stop reason: SDUPTHER

## 2021-08-12 RX ORDER — PREDNISONE 1 MG/1
5 TABLET ORAL DAILY
COMMUNITY
Start: 2021-08-12 | End: 2022-05-20

## 2021-08-12 RX ORDER — LEVOTHYROXINE SODIUM 0.03 MG/1
TABLET ORAL
COMMUNITY
Start: 2021-08-09 | End: 2021-09-22
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
## 2021-08-23 ENCOUNTER — TELEPHONE (OUTPATIENT)
Dept: PULMONOLOGY | Facility: CLINIC | Age: 56
End: 2021-08-23

## 2021-09-22 ENCOUNTER — OFFICE VISIT (OUTPATIENT)
Dept: PULMONOLOGY | Facility: CLINIC | Age: 56
End: 2021-09-22

## 2021-09-22 VITALS
WEIGHT: 208 LBS | DIASTOLIC BLOOD PRESSURE: 85 MMHG | BODY MASS INDEX: 31.52 KG/M2 | OXYGEN SATURATION: 96 % | SYSTOLIC BLOOD PRESSURE: 126 MMHG | TEMPERATURE: 98 F | HEIGHT: 68 IN | RESPIRATION RATE: 18 BRPM | HEART RATE: 86 BPM

## 2021-09-22 DIAGNOSIS — J96.12 CHRONIC RESPIRATORY FAILURE WITH HYPERCAPNIA (HCC): ICD-10-CM

## 2021-09-22 DIAGNOSIS — F51.04 CHRONIC INSOMNIA: Primary | ICD-10-CM

## 2021-09-22 DIAGNOSIS — M35.9 UNDIFFERENTIATED CONNECTIVE TISSUE DISEASE (HCC): ICD-10-CM

## 2021-09-22 DIAGNOSIS — I27.20 PULMONARY HTN (HCC): ICD-10-CM

## 2021-09-22 DIAGNOSIS — J84.9 ILD (INTERSTITIAL LUNG DISEASE) (HCC): ICD-10-CM

## 2021-09-22 DIAGNOSIS — I50.812 CHRONIC RIGHT-SIDED CONGESTIVE HEART FAILURE (HCC): ICD-10-CM

## 2021-09-22 DIAGNOSIS — M33.20 POLYMYOSITIS (HCC): ICD-10-CM

## 2021-09-22 DIAGNOSIS — J44.1 COPD WITH ACUTE EXACERBATION (HCC): Primary | ICD-10-CM

## 2021-09-22 PROCEDURE — 99213 OFFICE O/P EST LOW 20 MIN: CPT | Performed by: INTERNAL MEDICINE

## 2021-09-22 RX ORDER — ZOLPIDEM TARTRATE 5 MG/1
5 TABLET ORAL NIGHTLY PRN
Qty: 30 TABLET | Refills: 3 | Status: SHIPPED | OUTPATIENT
Start: 2021-09-22 | End: 2022-02-14 | Stop reason: SDUPTHER

## 2021-09-22 RX ORDER — AMOXICILLIN 500 MG/1
1000 CAPSULE ORAL 2 TIMES DAILY
Qty: 14 CAPSULE | Refills: 0 | Status: SHIPPED | OUTPATIENT
Start: 2021-09-22 | End: 2021-09-29

## 2021-09-22 NOTE — PROGRESS NOTES
"    Pulmonary Consultation    No ref. provider found,    Thank you for asking me to see Braxton Mcghee for   Chief Complaint   Patient presents with   • Shortness of Breath   • Cough     \"sometimes passes out\"   .      History of Present Illness  Braxton Mcghee is a 56 y.o. male with a PMH significant for interstitial lung disease CHF polymyositis pulmonary hypertension and COPD with chronic obstructive sleep apnea on CPAP presents for follow-up with worsening breathlessness patient has been having some episodes of severe coughing and chest congestion with thick purulent sputum over the past couple of weeks he does continue to have swelling of his extremities patient denies any hemoptysis or chest pain or fever       Tobacco use history:  Type: cigarettes  Amount: 1 ppd  Duration: 30 years  Cessation: Yes   Willing to quit: na      Review of Systems: History obtained from chart review and the patient.  Review of Systems   Constitutional: Positive for fatigue.   HENT: Positive for congestion.    Eyes: Negative.    Respiratory: Positive for cough and shortness of breath.    Cardiovascular: Positive for leg swelling.   All other systems reviewed and are negative.    As described in the HPI. Otherwise, remainder of ROS (14 systems) were negative.    Patient Active Problem List   Diagnosis   • Polymyositis (CMS/HCC)   • Undifferentiated connective tissue disease (CMS/HCC)   • ILD (interstitial lung disease) (CMS/HCC)   • Chronic respiratory failure with hypercapnia (CMS/HCC)   • Pulmonary HTN (CMS/HCC)         Current Outpatient Medications:   •  apixaban (Eliquis) 5 MG tablet tablet, Eliquis 5 mg oral tablet take 1 tablet (5 mg) by oral route 2 times per day 3/2/2021  Active, Disp: , Rfl:   •  furosemide (Lasix) 80 MG tablet, Lasix 80 mg oral tablet take 1 tablet (80 mg) by oral route once daily 3/2/2021  Active, Disp: , Rfl:   •  hydroxychloroquine (PLAQUENIL) 200 MG tablet, Take 200 mg by mouth Daily., Disp: , Rfl: " "  •  ipratropium-albuterol (COMBIVENT RESPIMAT)  MCG/ACT inhaler, Inhale 1 puff 4 (Four) Times a Day As Needed for Wheezing., Disp: 4 g, Rfl: 11  •  metOLazone (ZAROXOLYN) 5 MG tablet, Take 5 mg by mouth., Disp: , Rfl:   •  predniSONE (DELTASONE) 5 MG tablet, Take 5 mg by mouth Daily., Disp: , Rfl:   •  amoxicillin (AMOXIL) 500 MG capsule, Take 2 capsules by mouth 2 (Two) Times a Day for 7 days., Disp: 14 capsule, Rfl: 0  •  beclomethasone (Qvar) 80 MCG/ACT inhaler, Inhale 1 puff., Disp: , Rfl:     Allergies   Allergen Reactions   • Beta Adrenergic Blockers Nausea Only     \"Made really sick/couldn't eat\"       Past Medical History:   Diagnosis Date   • Sleep apnea, obstructive      History reviewed. No pertinent surgical history.  Social History     Socioeconomic History   • Marital status:      Spouse name: Not on file   • Number of children: Not on file   • Years of education: Not on file   • Highest education level: Not on file   Tobacco Use   • Smoking status: Former Smoker     Packs/day: 1.00     Years: 30.00     Pack years: 30.00     Types: Cigarettes     Start date: 1981     Quit date:      Years since quittin.7   • Smokeless tobacco: Never Used   Vaping Use   • Vaping Use: Never used   Substance and Sexual Activity   • Alcohol use: Defer   • Drug use: Defer   • Sexual activity: Defer     History reviewed. No pertinent family history.       Objective     Blood pressure 126/85, pulse 86, temperature 98 °F (36.7 °C), temperature source Tympanic, resp. rate 18, height 172.7 cm (68\"), weight 94.3 kg (208 lb), SpO2 96 %.  Physical Exam  Vitals and nursing note reviewed.   Constitutional:       Appearance: He is ill-appearing.   HENT:      Head: Normocephalic and atraumatic.      Nose: Congestion present.      Mouth/Throat:      Mouth: Mucous membranes are moist.   Eyes:      Extraocular Movements: Extraocular movements intact.      Pupils: Pupils are equal, round, and reactive to light. "   Cardiovascular:      Rate and Rhythm: Normal rate and regular rhythm.      Pulses: Normal pulses.      Heart sounds: Normal heart sounds.   Pulmonary:      Effort: Pulmonary effort is normal.      Breath sounds: Rhonchi present.   Abdominal:      General: Abdomen is flat. Bowel sounds are normal.      Palpations: Abdomen is soft.   Musculoskeletal:      Cervical back: Normal range of motion and neck supple.      Right lower leg: Edema present.      Left lower leg: Edema present.   Skin:     General: Skin is warm.      Capillary Refill: Capillary refill takes less than 2 seconds.   Neurological:      General: No focal deficit present.      Mental Status: He is oriented to person, place, and time.   Psychiatric:         Mood and Affect: Mood normal.             Radiology (independently reviewed and interpreted by me): Chest x-ray revealed basilar opacities likely from interstitial lung disease as well as atelectasis       Assessment/Plan     Diagnoses and all orders for this visit:    1. COPD with acute exacerbation (HCC) (Primary)  -     amoxicillin (AMOXIL) 500 MG capsule; Take 2 capsules by mouth 2 (Two) Times a Day for 7 days.  Dispense: 14 capsule; Refill: 0    2. Chronic respiratory failure with hypercapnia (HCC)    3. Pulmonary HTN (HCC)    4. ILD (interstitial lung disease) (HCC)    5. Polymyositis (HCC)    6. Undifferentiated connective tissue disease (HCC)    7. Chronic right-sided congestive heart failure (HCC)         Discussion/ Recommendations:   Patient has follow-up appointment in pulmonary hypertension clinic in [place] he is advised to continue that he is advised to continue his home oxygen along with Combivent Respimat and home medications with frequent blood pressure checks patient is advised 2 g sodium diet patient has COPD exacerbation will therefore place him on Amoxil for 1 week patient is advised to continue his diuretics and continue CPAP machine at night for sleep apnea as his pulmonary  hypertension seems to be multifactorial patient is further advised to remain off work for another 4 weeks because of his dyspnea    Patient's Body mass index is 31.63 kg/m². indicating that he is obese (BMI >30). Obesity-related health conditions include the following: obstructive sleep apnea. Obesity is unchanged. BMI is is above average; BMI management plan is completed. We discussed low calorie, low carb based diet program, portion control and increasing exercise..           Return in about 3 months (around 12/22/2021).      Thank you for allowing me to participate in the care of Braxton SUMANTH Mcghee. Please do not hesitate to contact me with any questions.         This document has been electronically signed by Luis Nur MD on September 22, 2021 10:06 EDT

## 2021-10-18 RX ORDER — APIXABAN 5 MG/1
TABLET, FILM COATED ORAL
Qty: 60 TABLET | Refills: 0 | Status: SHIPPED | OUTPATIENT
Start: 2021-10-18 | End: 2021-12-10 | Stop reason: SDUPTHER

## 2021-11-04 ENCOUNTER — TELEPHONE (OUTPATIENT)
Dept: CARDIOLOGY | Facility: CLINIC | Age: 56
End: 2021-11-04

## 2021-11-04 NOTE — TELEPHONE ENCOUNTER
Received VM from patient.    Returned call. Patient stated that he was just released from [facility] and they ar ewanting to know if Eliquis can be stopped due to needing multiple procedures and initiate infusion for lung condition.    Patient was on Eliquis for history of aflutter.    Please advise.

## 2021-12-10 ENCOUNTER — OFFICE VISIT (OUTPATIENT)
Dept: CARDIOLOGY | Facility: CLINIC | Age: 56
End: 2021-12-10

## 2021-12-10 VITALS
DIASTOLIC BLOOD PRESSURE: 80 MMHG | HEIGHT: 68 IN | HEART RATE: 86 BPM | SYSTOLIC BLOOD PRESSURE: 111 MMHG | BODY MASS INDEX: 30.46 KG/M2 | WEIGHT: 201 LBS

## 2021-12-10 DIAGNOSIS — I50.810 RIGHT HEART FAILURE WITH REDUCED RIGHT VENTRICULAR FUNCTION (HCC): ICD-10-CM

## 2021-12-10 DIAGNOSIS — I48.92 PAROXYSMAL ATRIAL FLUTTER (HCC): ICD-10-CM

## 2021-12-10 DIAGNOSIS — I27.81 COR PULMONALE, CHRONIC (HCC): Primary | ICD-10-CM

## 2021-12-10 DIAGNOSIS — Z99.89 OSA ON CPAP: ICD-10-CM

## 2021-12-10 DIAGNOSIS — I27.21 PULMONARY ARTERIAL HYPERTENSION (HCC): ICD-10-CM

## 2021-12-10 DIAGNOSIS — G47.33 OSA ON CPAP: ICD-10-CM

## 2021-12-10 PROCEDURE — 93000 ELECTROCARDIOGRAM COMPLETE: CPT | Performed by: INTERNAL MEDICINE

## 2021-12-10 PROCEDURE — 99214 OFFICE O/P EST MOD 30 MIN: CPT | Performed by: INTERNAL MEDICINE

## 2021-12-10 RX ORDER — LEVOTHYROXINE SODIUM 25 UG/1
25 TABLET ORAL DAILY
COMMUNITY
Start: 2021-10-31

## 2021-12-10 RX ORDER — OMEPRAZOLE 20 MG/1
20 CAPSULE, DELAYED RELEASE ORAL DAILY
COMMUNITY

## 2021-12-10 RX ORDER — METOLAZONE 5 MG/1
5 TABLET ORAL 2 TIMES DAILY
Qty: 180 TABLET | Refills: 3 | Status: SHIPPED | OUTPATIENT
Start: 2021-12-10

## 2021-12-10 RX ORDER — EPOPROSTENOL 1.5 MG/10ML
INJECTION, POWDER, LYOPHILIZED, FOR SOLUTION INTRAVENOUS
COMMUNITY
Start: 2021-11-18 | End: 2022-05-20

## 2021-12-10 NOTE — PROGRESS NOTES
Chief Complaint  Atrial Flutter, Hypertension, and pulm htn    Subjective     {Problem List  Visit Diagnosis   Encounters  Notes  Medications  Labs  Result Review Imaging  Media :23}       Braxton Mcghee presents to [facility] CARDIOLOGY  History of Present Illness  Mr. Mcghee presents for routine follow up today and states he is doing well.  He has been evaluated at [place] pulmonary hypertension clinic since his last visit with me and has been started on continuous epoprostenol.  He has lost some weight and his bilateral lower extremity edema is resolved.  He states that he needs refills on his Zaroxolyn and Eliquis prescriptions today.  His BP remains well controlled at 111/80 today.  No episodes of chest pain, palpitations, PND, lightheadedness, or syncope reported.  His exertional dyspnea has improved somewhat since starting the epoprostenol and he states he can definitely walk further now.  The patient's ECG obtained today showed sinus tachycardia with first-degree AV block, biatrial enlargement, right axis deviation, and RVH (not significantly changed from his prior ECG earlier this year).  He has a history of chronic right-sided heart failure and pulmonary arterial hypertension, as well as severe restrictive lung disease, which is felt to be secondary to pneumonitis versus interstitial lung disease.  He remains on chronic anticoagulation with Eliquis for previous a previous episode of paroxysmal atrial flutter.      Past Medical History:   Diagnosis Date   • Abnormal ECG    • Arrhythmia    • Atrial flutter (HCC)    • CHF (congestive heart failure) (HCC)    • Hypertension    • Myocardial infarction (HCC)    • Pulmonary embolism (HCC)    • Sleep apnea, obstructive        Past Surgical History:   • CARDIAC CATHETERIZATION       Social History     Tobacco Use   • Smoking status: Former Smoker     Packs/day: 1.00     Years: 30.00     Pack years: 30.00     Types: Cigarettes     Start  "date: 1981     Quit date:      Years since quittin.9   • Smokeless tobacco: Never Used   Vaping Use   • Vaping Use: Never used   Substance Use Topics   • Alcohol use: Yes     Comment: occassionally   • Drug use: Never       Family History   Problem Relation Age of Onset   • Heart failure Mother    • Lupus Mother    • Stroke Mother    • Cancer Father    • Diabetes Brother         Current Outpatient Medications on File Prior to Visit   Medication Sig   • Euthyrox 25 MCG tablet Daily.   • furosemide (Lasix) 80 MG tablet 1 1/2 tabs po q am  1 1/2 tabs at 1 pm   • hydroxychloroquine (PLAQUENIL) 200 MG tablet Take 200 mg by mouth Daily.   • ipratropium-albuterol (COMBIVENT RESPIMAT)  MCG/ACT inhaler Inhale 1 puff 4 (Four) Times a Day As Needed for Wheezing.   • omeprazole (priLOSEC) 20 MG capsule Take 20 mg by mouth Daily.   • predniSONE (DELTASONE) 5 MG tablet Take 5 mg by mouth Daily.   • Veletri 1.5 MG injection Infuse  into a venous catheter.   • zolpidem (AMBIEN) 5 MG tablet Take 1 tablet by mouth At Night As Needed (as needed for sleep). for sleep   • [DISCONTINUED] Eliquis 5 MG tablet tablet Take 1 tablet by mouth twice daily   • [DISCONTINUED] metOLazone (ZAROXOLYN) 5 MG tablet Take 5 mg by mouth 2 (Two) Times a Day.   • [DISCONTINUED] beclomethasone (Qvar) 80 MCG/ACT inhaler Inhale 1 puff.   • [DISCONTINUED] ipratropium-albuterol (COMBIVENT RESPIMAT)  MCG/ACT inhaler Inhale 1 puff 4 (Four) Times a Day As Needed for Wheezing.     No current facility-administered medications on file prior to visit.       Allergies   Allergen Reactions   • Beta Adrenergic Blockers Nausea Only     \"Made really sick/couldn't eat\"       Review of Systems   HENT: Negative for hearing loss and sore throat.    Eyes: Negative for pain and visual disturbance.   Respiratory: Positive for shortness of breath. Negative for cough, chest tightness and wheezing.    Cardiovascular: Negative for chest pain, palpitations and " "leg swelling.   Gastrointestinal: Negative for abdominal pain, blood in stool and vomiting.   Genitourinary: Negative for dysuria and hematuria.   Skin: Negative for color change, pallor and rash.   Neurological: Negative for tremors, syncope and light-headedness.        Objective     /80   Pulse 86   Ht 172.7 cm (68\")   Wt 91.2 kg (201 lb)   BMI 30.56 kg/m²       Physical Exam  Constitutional:       General: He is not in acute distress.     Appearance: Normal appearance.   HENT:      Head: Atraumatic.      Mouth/Throat:      Mouth: Mucous membranes are moist.      Pharynx: Oropharynx is clear. No oropharyngeal exudate.   Eyes:      General: No scleral icterus.     Conjunctiva/sclera: Conjunctivae normal.   Neck:      Vascular: No carotid bruit or JVD.   Cardiovascular:      Rate and Rhythm: Normal rate and regular rhythm.      Chest Wall: PMI is displaced.      Pulses: Normal pulses.           Radial pulses are 2+ on the right side and 2+ on the left side.      Heart sounds: S1 normal and S2 normal. Murmur heard.    Systolic murmur is present with a grade of 2/6.  No friction rub. No gallop. No S3 sounds.    Pulmonary:      Effort: Pulmonary effort is normal. Prolonged expiration present. No tachypnea or respiratory distress.      Breath sounds: No decreased breath sounds, wheezing, rhonchi or rales.   Chest:      Chest wall: No tenderness.   Abdominal:      General: Bowel sounds are normal. There is no distension.      Palpations: Abdomen is soft. There is no mass.      Tenderness: There is no abdominal tenderness.   Musculoskeletal:         General: No swelling, tenderness or deformity.      Cervical back: Neck supple. No tenderness.      Right lower leg: No edema.      Left lower leg: No edema.   Skin:     General: Skin is warm and dry.      Coloration: Skin is not jaundiced.      Findings: No erythema or rash.      Nails: There is clubbing.   Neurological:      General: No focal deficit present.      " Mental Status: He is alert and oriented to person, place, and time.      Motor: No weakness.   Psychiatric:         Mood and Affect: Mood normal.         Behavior: Behavior normal.         Result Review :     The following data was reviewed by: Jayce Plummer MD on 12/10/2021:    CMP    CMP 11/17/21 11/17/21 11/18/21 11/18/21 11/18/21 11/19/21    0711 1700 0602 1421 1727    Glucose 92  97   83   BUN 28 (A)  22   21   Creatinine 0.62 (A)  0.75   0.76   Sodium 131 (A)  133 (A)   136   Potassium 2.7 (A) 3.4 2.7 (A) 5.6 (A) 3.7 3.4   Chloride 90 (A)  92 (A)   95 (A)   Calcium 9.2  9.3   9.4   (A) Abnormal value       Comments are available for some flowsheets but are not being displayed.              ECG 12 Lead    Date/Time: 12/10/2021 1:04 PM  Performed by: Jayce Plummer MD  Authorized by: Jayce Plummer MD   Comparison: compared with previous ECG from 5/25/2021  Similar to previous ECG  Rhythm: sinus tachycardia  BPM: 101  Conduction: 1st degree AV block  QRS axis: right  Other findings: non-specific ST-T wave changes, left atrial abnormality and right atrial abnormality  Other findings comments: right ventricular hypertrophy    Clinical impression: abnormal EKG  Comments: Sinus tachycardia with first-degree AV block, biatrial enlargement, right axis deviation, and right ventricular hypertrophy, as well as nonspecific ST-T changes in the anterior and lateral leads.  The findings are not significantly changed from the patient's prior ECG on May 25, 2021.        Assessment and Plan      Diagnoses and all orders for this visit:    1. Cor pulmonale, chronic (HCC) (Primary)  -     metOLazone (ZAROXOLYN) 5 MG tablet; Take 1 tablet by mouth 2 (Two) Times a Day.  Dispense: 180 tablet; Refill: 3  -     ECG 12 Lead    2. Pulmonary arterial hypertension (HCC)  -     apixaban (Eliquis) 5 MG tablet tablet; Take 1 tablet by mouth 2 (Two) Times a Day.  Dispense: 180 tablet; Refill: 3  -     ECG 12 Lead    3.  Paroxysmal atrial flutter (HCC)  -     apixaban (Eliquis) 5 MG tablet tablet; Take 1 tablet by mouth 2 (Two) Times a Day.  Dispense: 180 tablet; Refill: 3  -     ECG 12 Lead    4. DASIA on CPAP    5. Right heart failure with reduced right ventricular function (HCC)  -     metOLazone (ZAROXOLYN) 5 MG tablet; Take 1 tablet by mouth 2 (Two) Times a Day.  Dispense: 180 tablet; Refill: 3    -Chronic cor pulmonale with severe pulmonary arterial hypertension:  Stable, symptoms improved significantly since starting continuous epoprestenol (provided through his physician at [place]).  Continue Lasix and Zaroxolyn at current doses; refills provided as requested today.  Follow up as scheduled with his physicians at [place] and follow up with me in 8-9 months.    -Paroxysmal atrial flutter:  Continue chronic anticoagulation with Eliquis; refill provided as above.      Follow Up     Return in about 8 months (around 8/10/2022) for Next scheduled follow up.    Patient was given instructions and counseling regarding his condition or for health maintenance advice. Please see specific information pulled into the AVS if appropriate.     Braxton Mcghee  reports that he quit smoking about 6 years ago. His smoking use included cigarettes. He started smoking about 40 years ago. He has a 30.00 pack-year smoking history. He has never used smokeless tobacco.. I have educated him on the risk of diseases from using tobacco products such as cancer, COPD and heart disease.  Continued cessation was advised.

## 2021-12-16 ENCOUNTER — OFFICE VISIT (OUTPATIENT)
Dept: PULMONOLOGY | Facility: CLINIC | Age: 56
End: 2021-12-16

## 2021-12-16 VITALS
BODY MASS INDEX: 31.55 KG/M2 | DIASTOLIC BLOOD PRESSURE: 59 MMHG | TEMPERATURE: 98.9 F | HEIGHT: 68 IN | WEIGHT: 208.2 LBS | HEART RATE: 104 BPM | OXYGEN SATURATION: 93 % | RESPIRATION RATE: 16 BRPM | SYSTOLIC BLOOD PRESSURE: 109 MMHG

## 2021-12-16 DIAGNOSIS — J84.9 ILD (INTERSTITIAL LUNG DISEASE) (HCC): ICD-10-CM

## 2021-12-16 DIAGNOSIS — G47.33 OSA (OBSTRUCTIVE SLEEP APNEA): ICD-10-CM

## 2021-12-16 DIAGNOSIS — Z86.711 HISTORY OF PULMONARY EMBOLUS (PE): ICD-10-CM

## 2021-12-16 DIAGNOSIS — I27.20 PULMONARY HYPERTENSION (HCC): ICD-10-CM

## 2021-12-16 DIAGNOSIS — M33.29: ICD-10-CM

## 2021-12-16 DIAGNOSIS — I27.21 PULMONARY ARTERIAL HYPERTENSION (HCC): Primary | ICD-10-CM

## 2021-12-16 PROBLEM — E87.6 HYPOKALEMIA: Status: ACTIVE | Noted: 2021-10-23

## 2021-12-16 PROBLEM — S81.812A LEG LACERATION, LEFT, INITIAL ENCOUNTER: Status: ACTIVE | Noted: 2021-10-22

## 2021-12-16 PROBLEM — I51.9 RIGHT VENTRICULAR DYSFUNCTION: Status: ACTIVE | Noted: 2021-10-23

## 2021-12-16 PROBLEM — E03.9 HYPOTHYROIDISM: Status: ACTIVE | Noted: 2021-10-23

## 2021-12-16 PROCEDURE — 99214 OFFICE O/P EST MOD 30 MIN: CPT | Performed by: INTERNAL MEDICINE

## 2021-12-16 RX ORDER — TRAZODONE HYDROCHLORIDE 50 MG/1
100 TABLET ORAL NIGHTLY
Qty: 60 TABLET | Refills: 6 | Status: SHIPPED | OUTPATIENT
Start: 2021-12-16 | End: 2022-02-14

## 2021-12-16 RX ORDER — COLLAGENASE SANTYL 250 [ARB'U]/G
OINTMENT TOPICAL
COMMUNITY
Start: 2021-11-19 | End: 2022-08-12

## 2021-12-16 NOTE — PROGRESS NOTES
Primary Care Provider  Jose Adorno MD     Referring Provider  No ref. provider found     Chief Complaint  ILD, Shortness of Breath, and Follow-up (3 month)    Subjective          Braxton Mcghee presents to [facility] PULMONARY & CRITICAL CARE MEDICINE  History of Present Illness  Braxton Mcghee is a 56 y.o. male patient with severe primary pulmonary hypertension, likely related to polymyositis, interstitial lung disease, obstructive sleep apnea, obesity hypoventilation syndrome, chronic congestive heart failure with low ejection fraction and chronic respiratory failure.  He is here for follow-up.  Since his last office visit he had gone to [place] pulmonary hypertension clinic had urgent admission to the hospital had right and left heart catheterization and was found to have severe pulmonary hypertension with right heart failure.  He was started on epoprostenol infusion.  Epoprostenol has been slowly uptitrated.  He is currently at 10 ng/kg/min.  He has been coordinating it with pulmonary hypertension clinic in [facility].  He continues to use his BiPAP machine with sleep.  He is using oxygen with activities.  He has lost about 20 pounds and it has helped him.  His current weight is 195 pounds.  He continues to take diuretics with metolazone and Lasix.  He is also on Eliquis 5 mg twice daily.  He has been on prednisone at 5 mg once daily and hydroxychloroquine for his polymyositis.  His breathing is better.  He is able to move around more.  He still has to pace himself with activities.  He feels like his cough has significantly improved.  No nausea or vomiting.  No flushing or headache.  No significant chest pain or chest tightness.  He has some jaw pain associated with Veletri but otherwise is doing okay.    Review of Systems     General:  Fatigue, No Fever No weight loss or loss of appetite  HEENT: No dysphagia, No Visual Changes, no rhinorrhea  Respiratory: Improving cough,+Dyspnea,  "minimal phlegm, No Pleuritic Pain, no wheezing, no hemoptysis.  Cardiovascular: Denies chest pain, denies palpitations,+FLORENTINO, No Chest Pressure  Gastrointestinal:  No Abdominal Pain, No Nausea, No Vomiting, No Diarrhea  Genitourinary:  No Dysuria, No Frequency, No Hesitancy  Musculoskeletal: No muscle pain or swelling  Endocrine:  No Heat Intolerance, No Cold Intolerance, No Fatigue  Hematologic:  No Bleeding, No Bruising  Psychiatric:  No Anxiety, No Depression  Neurologic:  No Confusion, no Dysarthria, No Headaches  Skin:  No Rash, No Open Wounds    Family History   Problem Relation Age of Onset   • Heart failure Mother    • Lupus Mother    • Stroke Mother    • Cancer Father    • Diabetes Brother         Social History     Socioeconomic History   • Marital status:    Tobacco Use   • Smoking status: Former Smoker     Packs/day: 1.00     Years: 30.00     Pack years: 30.00     Types: Cigarettes     Start date: 1981     Quit date:      Years since quittin.9   • Smokeless tobacco: Never Used   Vaping Use   • Vaping Use: Never used   Substance and Sexual Activity   • Alcohol use: Yes     Comment: occassionally   • Drug use: Never   • Sexual activity: Defer        Past Medical History:   Diagnosis Date   • Abnormal ECG    • Arrhythmia    • Atrial flutter (HCC)    • CHF (congestive heart failure) (HCC)    • Hypertension    • Myocardial infarction (HCC)    • Pulmonary embolism (HCC)    • Sleep apnea, obstructive         Immunization History   Administered Date(s) Administered   • COVID-19 ([place]) 2021   • Flu Vaccine Quad PF >36MO 2018, 10/18/2019, 2020   • FluLaval/Fluarix/Fluzone >6 10/26/2021   • Influenza Split Preservative Free ID 10/02/2020   • Tdap 10/22/2021         Allergies   Allergen Reactions   • Beta Adrenergic Blockers Nausea Only and Other (See Comments)     \"Made really sick/couldn't eat\"  Lightheaded, sweaty, nauseous, can not focus on anything           Current " "Outpatient Medications:   •  apixaban (Eliquis) 5 MG tablet tablet, Take 1 tablet by mouth 2 (Two) Times a Day., Disp: 180 tablet, Rfl: 3  •  collagenase (Santyl) 250 UNIT/GM ointment, , Disp: , Rfl:   •  Euthyrox 25 MCG tablet, Daily., Disp: , Rfl:   •  furosemide (Lasix) 80 MG tablet, 1 1/2 tabs po q am  1 1/2 tabs at 1 pm, Disp: , Rfl:   •  hydroxychloroquine (PLAQUENIL) 200 MG tablet, Take 200 mg by mouth Daily., Disp: , Rfl:   •  metOLazone (ZAROXOLYN) 5 MG tablet, Take 1 tablet by mouth 2 (Two) Times a Day., Disp: 180 tablet, Rfl: 3  •  predniSONE (DELTASONE) 5 MG tablet, Take 5 mg by mouth Daily., Disp: , Rfl:   •  Veletri 1.5 MG injection, Infuse  into a venous catheter., Disp: , Rfl:   •  zolpidem (AMBIEN) 5 MG tablet, Take 1 tablet by mouth At Night As Needed (as needed for sleep). for sleep, Disp: 30 tablet, Rfl: 3  •  ipratropium-albuterol (COMBIVENT RESPIMAT)  MCG/ACT inhaler, Inhale 1 puff 4 (Four) Times a Day As Needed for Wheezing., Disp: , Rfl:   •  omeprazole (priLOSEC) 20 MG capsule, Take 20 mg by mouth Daily., Disp: , Rfl:   •  traZODone (DESYREL) 50 MG tablet, Take 2 tablets by mouth Every Night., Disp: 60 tablet, Rfl: 6     Objective   Vital Signs:   /59 (BP Location: Left arm, Patient Position: Sitting, Cuff Size: Adult)   Pulse 104   Temp 98.9 °F (37.2 °C) (Tympanic)   Resp 16   Ht 172.7 cm (68\")   Wt 94.4 kg (208 lb 3.2 oz)   SpO2 93% Comment: room air  BMI 31.66 kg/m²     Mallampatti classification : 1  Physical Exam  Vital Signs Reviewed  WDWN, Alert, has mild conversational dyspnea.   HEENT:  PERRL, EOMI.  OP, nares clear, no sinus tenderness  Neck:  Supple, no JVD, no thyromegaly  Lymph: no axillary, cervical, supraclavicular lymphadenopathy noted bilaterally  Chest:  good aeration, bilateral diminished breath sounds, no wheezing, no rhonchi, minimal crackles at bases resonant to percussion b/l  CV: RRR, no MGR, pulses 2+, equal.  Abd:  Soft, NT, ND, + BS, no HSM  EXT: "  no clubbing, no cyanosis, 1+ BLE edema, left lower extremity, on lateral side has open wound, slowly healing  Neuro:  A&Ox3, CN grossly intact, no focal deficits  Skin: No rashes or lesions noted     Result Review :   The following data was reviewed by: Louis Geronimo MD on 12/16/2021:  Common labs    Common Labsle 11/17/21 11/18/21 11/18/21 11/19/21     1421 1727    Glucose    83   BUN    21   Creatinine    0.76   Sodium    136   Potassium 3.4 5.6 (A) 3.7 3.4   Chloride    95 (A)   Calcium    9.4   (A) Abnormal value       Comments are available for some flowsheets but are not being displayed.           CMP    CMP 11/17/21 11/18/21 11/18/21 11/19/21     1421 1727    Glucose    83   BUN    21   Creatinine    0.76   Sodium    136   Potassium 3.4 5.6 (A) 3.7 3.4   Chloride    95 (A)   Calcium    9.4   (A) Abnormal value       Comments are available for some flowsheets but are not being displayed.                 Data reviewed: Radiologic studies Previous imaging from outside hospital was reviewed.  Outside office note from pulmonary hypertension clinic reviewed.            Assessment and Plan    Diagnoses and all orders for this visit:    1. Pulmonary arterial hypertension (HCC) (Primary)    2. Pulmonary hypertension (HCC)    3. ILD (interstitial lung disease) (HCC)    4. Polymyositis with other organ involvement (HCC)    5. History of pulmonary embolus (PE)    6. DASIA (obstructive sleep apnea)    Other orders  -     traZODone (DESYREL) 50 MG tablet; Take 2 tablets by mouth Every Night.  Dispense: 60 tablet; Refill: 6      Continue Veletri at current dose with slow up titration.  Plan per [place] [facility] clinic.  I advised him to be very compliant with the medication.  He has spare dosing of Veletri to use if needed.  He has a tunneled catheter to have his medication infused all the time.  With his up titration of medication, his symptoms are improving.  He has also lost weight and overall it has  helped too.  Maintain his dry weight at 195 pounds or less.  Continue with diuretics including metolazone and Lasix.    Polymyositis and rheumatoid issues per Dr. Clayton.  Advised him to continue with BiPAP till we get replacement for his recall BiPAP machine.  Stop Ambien.  Start trazodone for insomnia.  Advised him to start at 50 mg once daily and increase it to 100 mg once daily in a week.  If it is not helping, we will go back on Ambien to 10 mg once daily.  Continue oxygen with sleep and activities.  Advised him to keep himself active.  However patient is not able to go back to work given his significant pulmonary hypertension and right heart failure.    Continue DuoNeb as needed.  Continue with Eliquis.    Stressed the importance of close follow-up with [place] pulmonary clinic.      Follow Up   Return in about 6 months (around 6/16/2022).  Patient was given instructions and counseling regarding his condition or for health maintenance advice. Please see specific information pulled into the AVS if appropriate.       Electronically signed by Louis Geronimo MD, 12/16/2021, 16:25 EST.

## 2022-02-14 DIAGNOSIS — F51.04 CHRONIC INSOMNIA: ICD-10-CM

## 2022-02-14 RX ORDER — ZOLPIDEM TARTRATE 5 MG/1
5 TABLET ORAL NIGHTLY PRN
Qty: 30 TABLET | Refills: 3 | Status: SHIPPED | OUTPATIENT
Start: 2022-02-14 | End: 2022-05-20 | Stop reason: SDUPTHER

## 2022-02-14 NOTE — TELEPHONE ENCOUNTER
Pt called stated Trazedone was causing him to have an upset stomach, nausea and diarrhea. Pt requesting to be put back on Ambien

## 2022-04-05 RX ORDER — ZOLPIDEM TARTRATE 10 MG/1
10 TABLET ORAL NIGHTLY PRN
Qty: 30 TABLET | Refills: 0 | Status: SHIPPED | OUTPATIENT
Start: 2022-04-05 | End: 2022-05-09

## 2022-05-04 DIAGNOSIS — I27.21 PULMONARY ARTERIAL HYPERTENSION: ICD-10-CM

## 2022-05-04 DIAGNOSIS — I48.92 PAROXYSMAL ATRIAL FLUTTER: ICD-10-CM

## 2022-05-04 RX ORDER — APIXABAN 5 MG/1
TABLET, FILM COATED ORAL
Qty: 180 TABLET | Refills: 3 | Status: ON HOLD | OUTPATIENT
Start: 2022-05-04 | End: 2022-12-28 | Stop reason: SDUPTHER

## 2022-05-04 NOTE — TELEPHONE ENCOUNTER
Rx Refill Note  Requested Prescriptions     Pending Prescriptions Disp Refills   • Eliquis 5 MG tablet tablet [Pharmacy Med Name: Eliquis 5 MG Oral Tablet] 60 tablet 0     Sig: Take 1 tablet by mouth twice daily      Last office visit with prescribing clinician: 12/10/2021      Next office visit with prescribing clinician: 8/12/2022            Imelda Mckeon  05/04/22, 15:33 EDT      Matches last OV note.

## 2022-05-09 RX ORDER — ZOLPIDEM TARTRATE 10 MG/1
10 TABLET ORAL NIGHTLY PRN
Qty: 30 TABLET | Refills: 0 | Status: SHIPPED | OUTPATIENT
Start: 2022-05-09 | End: 2022-05-20 | Stop reason: SDUPTHER

## 2022-05-20 ENCOUNTER — OFFICE VISIT (OUTPATIENT)
Dept: PULMONOLOGY | Facility: CLINIC | Age: 57
End: 2022-05-20

## 2022-05-20 VITALS
OXYGEN SATURATION: 95 % | BODY MASS INDEX: 28.7 KG/M2 | DIASTOLIC BLOOD PRESSURE: 68 MMHG | TEMPERATURE: 100 F | HEIGHT: 68 IN | WEIGHT: 189.4 LBS | SYSTOLIC BLOOD PRESSURE: 113 MMHG | RESPIRATION RATE: 20 BRPM | HEART RATE: 122 BPM

## 2022-05-20 DIAGNOSIS — J84.9 ILD (INTERSTITIAL LUNG DISEASE): ICD-10-CM

## 2022-05-20 DIAGNOSIS — J96.12 CHRONIC RESPIRATORY FAILURE WITH HYPERCAPNIA: ICD-10-CM

## 2022-05-20 DIAGNOSIS — Z86.711 HISTORY OF PULMONARY EMBOLUS (PE): ICD-10-CM

## 2022-05-20 DIAGNOSIS — G47.33 OSA (OBSTRUCTIVE SLEEP APNEA): ICD-10-CM

## 2022-05-20 DIAGNOSIS — F51.04 CHRONIC INSOMNIA: ICD-10-CM

## 2022-05-20 DIAGNOSIS — I51.9 RIGHT VENTRICULAR DYSFUNCTION: Primary | ICD-10-CM

## 2022-05-20 DIAGNOSIS — I27.21 PULMONARY ARTERIAL HYPERTENSION: ICD-10-CM

## 2022-05-20 PROCEDURE — 99214 OFFICE O/P EST MOD 30 MIN: CPT | Performed by: INTERNAL MEDICINE

## 2022-05-20 RX ORDER — TRAZODONE HYDROCHLORIDE 50 MG/1
100 TABLET ORAL NIGHTLY
COMMUNITY
Start: 2022-03-08 | End: 2022-05-20

## 2022-05-20 RX ORDER — BUMETANIDE 1 MG/1
1 TABLET ORAL 2 TIMES DAILY
COMMUNITY
Start: 2022-05-12 | End: 2022-12-01

## 2022-05-20 RX ORDER — TADALAFIL 20 MG/1
20 TABLET ORAL 2 TIMES DAILY
COMMUNITY
Start: 2022-05-11

## 2022-05-20 RX ORDER — PREDNISONE 10 MG/1
5 TABLET ORAL DAILY
COMMUNITY
Start: 2022-05-12 | End: 2022-12-01 | Stop reason: SDUPTHER

## 2022-05-20 RX ORDER — POTASSIUM CHLORIDE 20MEQ/15ML
LIQUID (ML) ORAL
COMMUNITY
Start: 2022-05-06 | End: 2022-08-12 | Stop reason: SDUPTHER

## 2022-05-20 RX ORDER — AMOXICILLIN 875 MG/1
875 TABLET, COATED ORAL EVERY 12 HOURS
COMMUNITY
Start: 2022-04-04 | End: 2022-08-12

## 2022-05-20 RX ORDER — SPIRONOLACTONE 25 MG/1
25 TABLET ORAL DAILY
COMMUNITY
Start: 2022-05-03

## 2022-05-20 RX ORDER — ZOLPIDEM TARTRATE 10 MG/1
10 TABLET ORAL NIGHTLY PRN
Qty: 30 TABLET | Refills: 2 | Status: SHIPPED | OUTPATIENT
Start: 2022-05-20 | End: 2022-09-13

## 2022-05-20 RX ORDER — EPOPROSTENOL 1.5 MG/10ML
1.5 INJECTION, POWDER, LYOPHILIZED, FOR SOLUTION INTRAVENOUS
Status: ON HOLD | COMMUNITY
Start: 2022-04-19 | End: 2022-12-28

## 2022-05-20 RX ORDER — POTASSIUM CHLORIDE 20 MEQ/1
20 TABLET, EXTENDED RELEASE ORAL AS NEEDED
Status: ON HOLD | COMMUNITY
Start: 2022-05-12 | End: 2022-12-28

## 2022-05-20 NOTE — PROGRESS NOTES
Primary Care Provider  Jose Adorno MD     Referring Provider  No ref. provider found     Chief Complaint  Interstial lung disease, Sleep Apnea, Shortness of Breath, and Follow-up (6 month follow up)    Subjective          Braxton Mcghee presents to [facility]  History of Present Illness  rBaxton Mcghee is a 57 y.o. male patient with severe primary pulmonary hypertension, likely related to polymyositis, interstitial lung disease, obstructive sleep apnea, obesity hypoventilation syndrome, chronic congestive heart failure with low ejection fraction and chronic respiratory failure.  He is here for follow-up.  Since his last office visit, he was on Lasix and metolazone.  However he gained about 20 pounds in few days.  He went emergently to [facility] in early April and was switched to Bumex during his admission there.  He stayed in the hospital for close to 2 weeks.  He is currently on Bumex daily and is on metolazone as needed.  Continues to take Veletri with dose uptitrated at [place] pulmonary hypertension clinic.  He is also on tadalafil 40 mg daily. He continues to use his BiPAP machine with sleep.  He is using oxygen with activities.  He continues to lose weight, maintaining currently in 180s.  His current weight is 184 pounds.  He is also on Eliquis 5 mg twice daily.  He has been on prednisone at 10 mg once daily and hydroxychloroquine for his polymyositis.  His breathing is better.  He is able to move around more.  He still has to pace himself with activities.  He feels like his cough has significantly improved.  No nausea or vomiting.  No flushing or headache.  No significant chest pain or chest tightness.  He has some jaw pain associated with Veletri but otherwise is doing okay.  I reviewed the CPAP usage data with him today.  Overall he is doing much better on it.    Review of Systems   Respiratory: Positive for shortness of breath.          General:  Fatigue, No Fever No weight loss or loss of appetite  HEENT: No dysphagia, No Visual Changes, no rhinorrhea  Respiratory: Improving cough,+Dyspnea, minimal phlegm, No Pleuritic Pain, no wheezing, no hemoptysis.  Cardiovascular: Denies chest pain, denies palpitations,+FLORENTINO, No Chest Pressure  Gastrointestinal:  No Abdominal Pain, No Nausea, No Vomiting, No Diarrhea  Genitourinary:  No Dysuria, No Frequency, No Hesitancy  Musculoskeletal: No muscle pain, has bilateral leg swelling  Endocrine:  No Heat Intolerance, No Cold Intolerance, No Fatigue  Hematologic:  No Bleeding, No Bruising  Psychiatric:  No Anxiety, No Depression  Neurologic:  No Confusion, no Dysarthria, No Headaches  Skin:  No Rash, No Open Wounds    Family History   Problem Relation Age of Onset   • Heart failure Mother    • Lupus Mother    • Stroke Mother    • Cancer Father    • Diabetes Brother         Social History     Socioeconomic History   • Marital status:    Tobacco Use   • Smoking status: Former Smoker     Packs/day: 1.00     Years: 30.00     Pack years: 30.00     Types: Cigarettes     Start date: 1981     Quit date:      Years since quittin.3   • Smokeless tobacco: Never Used   Vaping Use   • Vaping Use: Never used   Substance and Sexual Activity   • Alcohol use: Yes     Comment: occassionally   • Drug use: Never   • Sexual activity: Defer        Past Medical History:   Diagnosis Date   • Abnormal ECG    • Arrhythmia    • Atrial flutter (HCC)    • CHF (congestive heart failure) (HCC)    • Hypertension    • Myocardial infarction (HCC)    • Pulmonary embolism (HCC)    • Sleep apnea, obstructive         Immunization History   Administered Date(s) Administered   • COVID-19 (WEISSENHAUS) 2021   • Flu Vaccine Quad PF >36MO 2018, 10/18/2019, 2020   • FluLaval/Fluarix/Fluzone >6 10/26/2021   • Influenza Split Preservative Free ID 10/02/2020   • Tdap 10/22/2021         Allergies   Allergen Reactions   •  "Beta Adrenergic Blockers Nausea Only and Other (See Comments)     \"Made really sick/couldn't eat\"  Lightheaded, sweaty, nauseous, can not focus on anything           Current Outpatient Medications:   •  bumetanide (BUMEX) 2 MG tablet, , Disp: , Rfl:   •  Eliquis 5 MG tablet tablet, Take 1 tablet by mouth twice daily, Disp: 180 tablet, Rfl: 3  •  epoprostenol (FLOLAN) 1.5 MG injection, Infuse 1.5 mg into a venous catheter., Disp: , Rfl:   •  Euthyrox 25 MCG tablet, Daily., Disp: , Rfl:   •  hydroxychloroquine (PLAQUENIL) 200 MG tablet, Take 200 mg by mouth Daily., Disp: , Rfl:   •  metOLazone (ZAROXOLYN) 5 MG tablet, Take 1 tablet by mouth 2 (Two) Times a Day., Disp: 180 tablet, Rfl: 3  •  omeprazole (priLOSEC) 20 MG capsule, Take 20 mg by mouth Daily., Disp: , Rfl:   •  potassium chloride (K-DUR,KLOR-CON) 20 MEQ CR tablet, , Disp: , Rfl:   •  predniSONE (DELTASONE) 10 MG tablet, , Disp: , Rfl:   •  spironolactone (ALDACTONE) 50 MG tablet, , Disp: , Rfl:   •  tadalafil (ADCIRCA) 20 MG tablet tablet, , Disp: , Rfl:   •  zolpidem (AMBIEN) 10 MG tablet, TAKE 1 TABLET BY MOUTH AT NIGHT AS NEEDED FOR SLEEP, Disp: 30 tablet, Rfl: 0  •  amoxicillin (AMOXIL) 875 MG tablet, Take 875 mg by mouth Every 12 (Twelve) Hours., Disp: , Rfl:   •  collagenase (Santyl) 250 UNIT/GM ointment, , Disp: , Rfl:   •  potassium chloride (KAYCIEL) 20 mEq/15 mL solution, TAKE 15 ML BY MOUTH ONCE DAILY (REPLACES PILL FORM DOSE), Disp: , Rfl:      Objective   Vital Signs:   /68 (BP Location: Left arm, Patient Position: Sitting, Cuff Size: Adult)   Pulse (!) 122   Temp 100 °F (37.8 °C) (Tympanic)   Resp 20   Ht 172.7 cm (68\")   Wt 85.9 kg (189 lb 6.4 oz)   SpO2 95% Comment: room air  BMI 28.80 kg/m²     Mallampatti classification : 1  Physical Exam  Vital Signs Reviewed  WDWN, Alert, has mild conversational dyspnea.   HEENT:  PERRL, EOMI.  OP, nares clear, no sinus tenderness  Neck:  Supple, no JVD, no thyromegaly  Lymph: no axillary, " cervical, supraclavicular lymphadenopathy noted bilaterally  Chest:  good aeration, bilateral diminished breath sounds, no wheezing, no rhonchi, minimal crackles at bases resonant to percussion b/l  CV: RRR, no MGR, pulses 2+, equal.  Abd:  Soft, NT, ND, + BS, no HSM  EXT:  no clubbing, no cyanosis, 1+ BLE edema, left lower extremity, on lateral side has open wound, slowly healing  Neuro:  A&Ox3, CN grossly intact, no focal deficits  Skin: No rashes or lesions noted     Result Review :   The following data was reviewed by: Louis Geronimo MD on 12/16/2021:  Common labs    Common Labsle 4/17/22 4/18/22 4/19/22   Glucose 71 63 (A) 92   BUN 42 (A) 31 (A) 30 (A)   Creatinine 0.88 0.77 0.95   Sodium 133 (A) 133 (A) 133 (A)   Potassium 4.2 3.8 4.6   Chloride 85 (A) 88 (A) 89 (A)   Calcium 9.7 9.6 10.1   (A) Abnormal value       Comments are available for some flowsheets but are not being displayed.           CMP    CMP 4/17/22 4/18/22 4/19/22   Glucose 71 63 (A) 92   BUN 42 (A) 31 (A) 30 (A)   Creatinine 0.88 0.77 0.95   Sodium 133 (A) 133 (A) 133 (A)   Potassium 4.2 3.8 4.6   Chloride 85 (A) 88 (A) 89 (A)   Calcium 9.7 9.6 10.1   (A) Abnormal value       Comments are available for some flowsheets but are not being displayed.                 Data reviewed: Radiologic studies Previous imaging from outside hospital was reviewed.  Outside office note from pulmonary hypertension clinic reviewed.            Assessment and Plan    Diagnoses and all orders for this visit:    1. Right ventricular dysfunction (Primary)    2. History of pulmonary embolus (PE)    3. ILD (interstitial lung disease) (HCC)    4. Chronic respiratory failure with hypercapnia (HCC)    5. Pulmonary arterial hypertension (HCC)    6. DASIA (obstructive sleep apnea)    7. Chronic insomnia      Continue Veletri at current dose with up titration per pulm HTN clinic.  Plan per [place] [facility] hypertension clinic.  I advised him to be very compliant with the  medication.  He has spare dosing of Veletri to use if needed.  He has a tunneled catheter to have his medication infused all the time.  With his up titration of medication, his symptoms are improving steadily.  He has also lost weight and overall it has helped too.  Maintain his dry weight at 185 pounds or less.  Continue with diuretics including metolazone as needed and regular Bumex.    Polymyositis and rheumatoid issues per Dr. Clayton.  Advised him to continue with BiPAP till we get replacement for his recall CPAP machine.  We have gone back to Ambien 10 mg once daily.    Obstructive sleep apnea and obesity hypoventilation syndrome: CPAP usage data was reviewed.  He has been using it for 8 hours and 20 minutes on average with apnea-hypopnea index of 1.6.  CPAP pressure is at 16 cm of water.  Very compliant and Arent with it.  Has been losing weight.  Sleep hygiene was discussed in detail.    Continue oxygen with sleep and activities.  Advised him to keep himself active.  However patient is not able to go back to work given his significant pulmonary hypertension and right heart failure.    Continue DuoNeb as needed.  Continue with Eliquis.    Stressed the importance of close follow-up with [place] pulmonary clinic.      Follow Up   Return in about 6 months (around 11/20/2022).  Patient was given instructions and counseling regarding his condition or for health maintenance advice. Please see specific information pulled into the AVS if appropriate.       Electronically signed by Louis Geronimo MD, 5/20/2022, 12:06 EDT.

## 2022-08-12 ENCOUNTER — OFFICE VISIT (OUTPATIENT)
Dept: CARDIOLOGY | Facility: CLINIC | Age: 57
End: 2022-08-12

## 2022-08-12 ENCOUNTER — PREP FOR SURGERY (OUTPATIENT)
Dept: OTHER | Facility: HOSPITAL | Age: 57
End: 2022-08-12

## 2022-08-12 ENCOUNTER — HOSPITAL ENCOUNTER (OUTPATIENT)
Dept: CARDIOLOGY | Facility: HOSPITAL | Age: 57
Discharge: HOME | End: 2022-08-12
Admitting: INTERNAL MEDICINE

## 2022-08-12 VITALS
SYSTOLIC BLOOD PRESSURE: 97 MMHG | WEIGHT: 177.6 LBS | HEIGHT: 68 IN | HEART RATE: 146 BPM | DIASTOLIC BLOOD PRESSURE: 66 MMHG | BODY MASS INDEX: 26.92 KG/M2

## 2022-08-12 VITALS
HEART RATE: 138 BPM | OXYGEN SATURATION: 100 % | DIASTOLIC BLOOD PRESSURE: 80 MMHG | RESPIRATION RATE: 24 BRPM | SYSTOLIC BLOOD PRESSURE: 109 MMHG

## 2022-08-12 DIAGNOSIS — I48.92 PAROXYSMAL ATRIAL FLUTTER: Primary | ICD-10-CM

## 2022-08-12 DIAGNOSIS — I48.92 PAROXYSMAL ATRIAL FLUTTER: ICD-10-CM

## 2022-08-12 DIAGNOSIS — Z79.01 CHRONIC ANTICOAGULATION: ICD-10-CM

## 2022-08-12 DIAGNOSIS — I07.1 SEVERE TRICUSPID REGURGITATION BY PRIOR ECHOCARDIOGRAM: ICD-10-CM

## 2022-08-12 DIAGNOSIS — I27.21 PULMONARY ARTERIAL HYPERTENSION: ICD-10-CM

## 2022-08-12 DIAGNOSIS — I27.81 CHRONIC COR PULMONALE: ICD-10-CM

## 2022-08-12 LAB
MAXIMAL PREDICTED HEART RATE: 161 BPM
STRESS TARGET HR: 137 BPM

## 2022-08-12 PROCEDURE — 25010000002 DIPHENHYDRAMINE PER 50 MG: Performed by: INTERNAL MEDICINE

## 2022-08-12 PROCEDURE — 25010000002 MIDAZOLAM HCL (PF) 10 MG/2ML SOLUTION: Performed by: INTERNAL MEDICINE

## 2022-08-12 PROCEDURE — 99214 OFFICE O/P EST MOD 30 MIN: CPT | Performed by: INTERNAL MEDICINE

## 2022-08-12 PROCEDURE — 93010 ELECTROCARDIOGRAM REPORT: CPT | Performed by: INTERNAL MEDICINE

## 2022-08-12 PROCEDURE — 92960 CARDIOVERSION ELECTRIC EXT: CPT

## 2022-08-12 PROCEDURE — 92960 CARDIOVERSION ELECTRIC EXT: CPT | Performed by: INTERNAL MEDICINE

## 2022-08-12 PROCEDURE — 0 MEPERIDINE PER 100 MG: Performed by: INTERNAL MEDICINE

## 2022-08-12 PROCEDURE — 93005 ELECTROCARDIOGRAM TRACING: CPT | Performed by: INTERNAL MEDICINE

## 2022-08-12 PROCEDURE — 93000 ELECTROCARDIOGRAM COMPLETE: CPT | Performed by: INTERNAL MEDICINE

## 2022-08-12 RX ORDER — MIDAZOLAM HYDROCHLORIDE 10 MG/2ML
INJECTION, SOLUTION INTRAMUSCULAR; INTRAVENOUS
Status: COMPLETED | OUTPATIENT
Start: 2022-08-12 | End: 2022-08-12

## 2022-08-12 RX ORDER — MEPERIDINE HYDROCHLORIDE 25 MG/ML
INJECTION INTRAMUSCULAR; INTRAVENOUS; SUBCUTANEOUS
Status: COMPLETED | OUTPATIENT
Start: 2022-08-12 | End: 2022-08-12

## 2022-08-12 RX ORDER — HYDROCODONE BITARTRATE AND ACETAMINOPHEN 10; 325 MG/1; MG/1
TABLET ORAL
COMMUNITY
Start: 2022-07-01

## 2022-08-12 RX ORDER — DIPHENHYDRAMINE HYDROCHLORIDE 50 MG/ML
INJECTION INTRAMUSCULAR; INTRAVENOUS
Status: COMPLETED | OUTPATIENT
Start: 2022-08-12 | End: 2022-08-12

## 2022-08-12 RX ADMIN — MEPERIDINE HYDROCHLORIDE 25 MG: 25 INJECTION INTRAMUSCULAR; INTRAVENOUS; SUBCUTANEOUS at 13:54

## 2022-08-12 RX ADMIN — MIDAZOLAM HYDROCHLORIDE 4 MG: 5 INJECTION, SOLUTION INTRAMUSCULAR; INTRAVENOUS at 13:35

## 2022-08-12 RX ADMIN — DIPHENHYDRAMINE HYDROCHLORIDE 50 MG: 50 INJECTION, SOLUTION INTRAMUSCULAR; INTRAVENOUS at 13:46

## 2022-08-12 RX ADMIN — MEPERIDINE HYDROCHLORIDE 25 MG: 25 INJECTION INTRAMUSCULAR; INTRAVENOUS; SUBCUTANEOUS at 13:38

## 2022-08-12 RX ADMIN — MIDAZOLAM HYDROCHLORIDE 2 MG: 5 INJECTION, SOLUTION INTRAMUSCULAR; INTRAVENOUS at 13:40

## 2022-08-12 RX ADMIN — MEPERIDINE HYDROCHLORIDE 25 MG: 25 INJECTION INTRAMUSCULAR; INTRAVENOUS; SUBCUTANEOUS at 13:43

## 2022-08-12 RX ADMIN — MIDAZOLAM HYDROCHLORIDE 2 MG: 5 INJECTION, SOLUTION INTRAMUSCULAR; INTRAVENOUS at 13:50

## 2022-08-12 NOTE — PROGRESS NOTES
Chief Complaint  Hypertension and Congestive Heart Failure    Subjective            Braxton Mcghee presents to [facility] CARDIOLOGY  History of Present Illness  Mr. Mcghee presents for routine follow-up today and states he remains at his chronic baseline with no new problems this summer.  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  He is following at [place] [facility] and remains on Veletri.  His diuretic regimen is unchanged and he does not report any lower extremity edema, PND, lightheadedness, syncope, or chest pain/pressure.  He has experienced a few intermittent palpitations in recent days.  An ECG obtained in the office today showed atrial flutter with 2:1 ventricular response and a heart rate of approximately 150 bpm.  He has a history of previous paroxysmal atrial flutter in July 2020, and he underwent successful synchronized cardioversion at that time, with no documented recurrence of arrhythmias until today.  His most recent echo at [facility] earlier this year again revealed normal left ventricular systolic function, but a severely dilated right ventricle with moderate-severely decreased right ventricular systolic function and severe tricuspid regurgitation with markedly elevated pulmonary arterial pressures.  He reports good compliance with all his home medications and has not experienced any bleeding problems on chronic anticoagulation with Eliquis.  His BP was 109/80 in the office today.    Past Medical History:   Diagnosis Date   • Abnormal ECG    • Arrhythmia    • Atrial flutter (HCC)    • CHF (congestive heart failure) (HCC)    • Hypertension    • Myocardial infarction (HCC)    • Pulmonary embolism (HCC)    • Sleep apnea, obstructive        Past Surgical History:   • CARDIAC CATHETERIZATION       Social History     Tobacco Use   • Smoking status: Former Smoker     Packs/day: 1.00     Years: 30.00     Pack years: 30.00     Types: Cigarettes     Start  "date: 1981     Quit date:      Years since quittin.6   • Smokeless tobacco: Never Used   Vaping Use   • Vaping Use: Never used   Substance Use Topics   • Alcohol use: Yes     Comment: occassionally   • Drug use: Never       Family History   Problem Relation Age of Onset   • Heart failure Mother    • Lupus Mother    • Stroke Mother    • Cancer Father    • Diabetes Brother         Current Outpatient Medications on File Prior to Visit   Medication Sig   • bumetanide (BUMEX) 2 MG tablet Take 2 mg by mouth 2 (Two) Times a Day.   • Eliquis 5 MG tablet tablet Take 1 tablet by mouth twice daily   • epoprostenol (FLOLAN) 1.5 MG injection Infuse 1.5 mg into a venous catheter.   • Euthyrox 25 MCG tablet Daily.   • HYDROcodone-acetaminophen (NORCO)  MG per tablet TAKE 1/2 TO 1 (ONE-HALF TO ONE) TABLET BY MOUTH EVERY 6 HOURS AS NEEDED FOR PAIN   • metOLazone (ZAROXOLYN) 5 MG tablet Take 1 tablet by mouth 2 (Two) Times a Day.   • omeprazole (priLOSEC) 20 MG capsule Take 20 mg by mouth Daily.   • potassium chloride (K-DUR,KLOR-CON) 20 MEQ CR tablet    • predniSONE (DELTASONE) 10 MG tablet    • spironolactone (ALDACTONE) 50 MG tablet    • tadalafil (ADCIRCA) 20 MG tablet tablet    • zolpidem (AMBIEN) 10 MG tablet Take 1 tablet by mouth At Night As Needed for Sleep.   • [DISCONTINUED] amoxicillin (AMOXIL) 875 MG tablet Take 875 mg by mouth Every 12 (Twelve) Hours.   • [DISCONTINUED] collagenase (Santyl) 250 UNIT/GM ointment    • [DISCONTINUED] hydroxychloroquine (PLAQUENIL) 200 MG tablet Take 200 mg by mouth Daily.   • [DISCONTINUED] potassium chloride (KAYCIEL) 20 mEq/15 mL solution TAKE 15 ML BY MOUTH ONCE DAILY (REPLACES PILL FORM DOSE)     No current facility-administered medications on file prior to visit.       Allergies   Allergen Reactions   • Beta Adrenergic Blockers Nausea Only and Other (See Comments)     \"Made really sick/couldn't eat\"  Lightheaded, sweaty, nauseous, can not focus on anything  " "      Review of Systems   Constitutional: Positive for fatigue. Negative for chills, fever and unexpected weight gain.   HENT: Negative for hearing loss, nosebleeds and sore throat.    Eyes: Negative for pain and visual disturbance.   Respiratory: Positive for cough and shortness of breath. Negative for wheezing.    Cardiovascular: Positive for palpitations. Negative for chest pain and leg swelling.   Gastrointestinal: Negative for abdominal pain, blood in stool and vomiting.   Genitourinary: Negative for dysuria and hematuria.   Musculoskeletal: Negative for joint swelling and myalgias.   Skin: Negative for color change, pallor and rash.   Neurological: Negative for tremors, syncope, light-headedness and headache.   Hematological: Negative for adenopathy. Bruises/bleeds easily.        Objective     BP 97/66 (BP Location: Left arm, Patient Position: Sitting)   Pulse (!) 146   Ht 172.7 cm (68\")   Wt 80.6 kg (177 lb 9.6 oz)   BMI 27.00 kg/m²       Physical Exam  Constitutional:       General: He is not in acute distress.     Appearance: Normal appearance.   HENT:      Head: Atraumatic.      Mouth/Throat:      Mouth: Mucous membranes are moist.      Pharynx: Oropharynx is clear. No oropharyngeal exudate.   Eyes:      General: No scleral icterus.     Conjunctiva/sclera: Conjunctivae normal.   Neck:      Vascular: No carotid bruit or JVD.   Cardiovascular:      Rate and Rhythm: Regular rhythm. Tachycardia present.      Chest Wall: PMI is not displaced.      Pulses:           Radial pulses are 2+ on the right side and 2+ on the left side.      Heart sounds: S1 normal and S2 normal. Murmur heard.    Systolic murmur is present with a grade of 2/6.    No friction rub. No gallop. No S3 sounds.      Comments: Systolic murmur present at RLSB.  Pulmonary:      Effort: Pulmonary effort is normal. No tachypnea or respiratory distress.      Breath sounds: Examination of the right-lower field reveals decreased breath sounds. " Examination of the left-lower field reveals decreased breath sounds. Decreased breath sounds present. No wheezing, rhonchi or rales.   Chest:      Chest wall: No tenderness.   Abdominal:      General: Bowel sounds are normal. There is no distension.      Palpations: Abdomen is soft. There is no mass.      Tenderness: There is no abdominal tenderness.   Musculoskeletal:         General: No swelling, tenderness or deformity.      Cervical back: Neck supple. No tenderness.      Right lower leg: No edema.      Left lower leg: No edema.   Skin:     General: Skin is warm and dry.      Coloration: Skin is not jaundiced.      Findings: No erythema or rash.      Nails: There is no clubbing.      Comments: Chronic venous stasis skin changes of his distal lower extremities bilaterally.   Neurological:      General: No focal deficit present.      Mental Status: He is alert and oriented to person, place, and time.      Motor: No weakness.   Psychiatric:         Mood and Affect: Mood normal.         Behavior: Behavior normal.       Result Review :     The following data was reviewed by: Jayce Plumemr MD on 08/12/2022:    CMP    CMP 4/17/22 4/18/22 4/19/22   Glucose 71 63 (A) 92   BUN 42 (A) 31 (A) 30 (A)   Creatinine 0.88 0.77 0.95   Sodium 133 (A) 133 (A) 133 (A)   Potassium 4.2 3.8 4.6   Chloride 85 (A) 88 (A) 89 (A)   Calcium 9.7 9.6 10.1   (A) Abnormal value       Comments are available for some flowsheets but are not being displayed.           Echocardiogram from [facility] 4/5/22:  Normal left ventricular size and systolic function; LVEF 55-60%   Severely dilated right ventricle with moderate to severely depressed   systolic function and pressure-overload pattern of inter-ventricular   septum   Severe right atrial enlargement   Severe TR with hepatic vein flow reversal; RVSP 92 mmHg (assuming RAP 15   mmHg; bowing of inter-atrial septum to the left suggestive of elevated RA   pressure)   No  evidence of inter-atrial shunt by color Doppler or agitated saline   contrast at rest or with Valsalva   No pericardial effusion   Compared to prior echocardiogram from 10/25/2021, there is no significant   change      ECG 12 Lead    Date/Time: 8/12/2022 9:57 AM  Performed by: Jayce Plummer MD  Authorized by: Jayce Plummer MD   Comparison: compared with previous ECG from 12/10/2021  Comparison to previous ECG: Prior ECG revealed sinus tachycardia with first-degree AV block, right ventricular hypertrophy, right axis deviation, and biatrial enlargement.  Rhythm: atrial flutter  Rate: tachycardic  BPM: 145  QRS axis: indeterminate    Clinical impression: abnormal EKG  Comments: Atrial flutter with 2:1 ventricular response and a heart rate of 145 bpm, which is significantly changed from the patient's prior ECG in December 2021 demonstrating sinus tachycardia.        Assessment and Plan      Diagnoses and all orders for this visit:    1. Paroxysmal atrial flutter (HCC) (Primary)  -     ECG 12 Lead  -     Ambulatory Referral to Cardiac Electrophysiology    2. Chronic anticoagulation    3. Pulmonary arterial hypertension (HCC)  -     ECG 12 Lead    4. Chronic cor pulmonale (HCC)  -     ECG 12 Lead    5. Severe tricuspid regurgitation by prior echocardiogram  -     ECG 12 Lead    -Paroxysmal atrial flutter (with 2:1 ventricular response):  This represents his first documented recurrence of atrial flutter since July 2020.  We will proceed with synchronized electrical cardioversion today.  The risks and benefits of the procedure were reviewed with him in detail.  He states he has not missed any doses of Eliquis for many months, so there is no need to perform a ANKUR prior to cardioversion.  I will place a referral for him to see Dr. Schrader for consideration of RFA now that he has experienced a second episode of atrial flutter.  Continue chronic anticoagulation; no bleeding problems reported.    -Chronic cor  pulmonale with severe pulmonary arterial hypertension (primary pulmonary hypertension, likely secondary to polymyositis):  Stable, his symptoms remain significantly improved since he was started on Veletri through the Pul[facility] Hypertension Clinic at [place].  Continue Lasix, metolazone, and spironolactone at current doses.  He appears euvolemic today with no evidence of decompensated CHF.      -Severe tricuspid regurgitation:  Secondary to severe RV dilatation.  No evidence of volume overload at present.  Continue current diuretic regimen.       Follow Up     Return in about 6 months (around 2/12/2023) for Next scheduled follow up.    Patient was given instructions and counseling regarding his condition or for health maintenance advice. Please see specific information pulled into the AVS if appropriate.     Braxton Mcghee  reports that he quit smoking about 7 years ago. His smoking use included cigarettes. He started smoking about 41 years ago. He has a 30.00 pack-year smoking history. He has never used smokeless tobacco.  I have educated him on the risk of diseases from using tobacco products such as cancer, COPD and heart disease.  Continued cessation was advised.

## 2022-08-12 NOTE — DISCHARGE INSTRUCTIONS
Echo Lab Phone Number: [phone]      A responsible adult should drive you home and stay with you today and tonight.  Do not drive a car or operate any hazardous machinery for 24 hours.  Do not drink any alcoholic beverages for 24 hours.  Do not make any legal decisions for 24 hours after sedation.  Do not engage in any strenuous activity.  Resume your medications, following prescribed instructions.  Contact your caregiver if you have questions or concerns about your care.    In the event of an emergency, call 911 or go to your nearest emergency room.    You received the following medications during your procedure: Versed, Demerol, and Benadryl.

## 2022-08-14 LAB — QT INTERVAL: 328 MS

## 2022-09-13 DIAGNOSIS — F51.04 CHRONIC INSOMNIA: ICD-10-CM

## 2022-09-13 DIAGNOSIS — G47.33 OSA (OBSTRUCTIVE SLEEP APNEA): ICD-10-CM

## 2022-09-13 RX ORDER — ZOLPIDEM TARTRATE 10 MG/1
10 TABLET ORAL NIGHTLY PRN
Qty: 30 TABLET | Refills: 0 | Status: SHIPPED | OUTPATIENT
Start: 2022-09-13 | End: 2022-10-13 | Stop reason: SDUPTHER

## 2022-10-11 DIAGNOSIS — F51.04 CHRONIC INSOMNIA: ICD-10-CM

## 2022-10-11 DIAGNOSIS — G47.33 OSA (OBSTRUCTIVE SLEEP APNEA): ICD-10-CM

## 2022-10-13 ENCOUNTER — OFFICE VISIT (OUTPATIENT)
Dept: PULMONOLOGY | Facility: CLINIC | Age: 57
End: 2022-10-13

## 2022-10-13 VITALS
DIASTOLIC BLOOD PRESSURE: 80 MMHG | RESPIRATION RATE: 12 BRPM | SYSTOLIC BLOOD PRESSURE: 116 MMHG | WEIGHT: 180.8 LBS | TEMPERATURE: 98.6 F | BODY MASS INDEX: 27.4 KG/M2 | HEART RATE: 84 BPM | OXYGEN SATURATION: 96 % | HEIGHT: 68 IN

## 2022-10-13 DIAGNOSIS — J84.9 ILD (INTERSTITIAL LUNG DISEASE): ICD-10-CM

## 2022-10-13 DIAGNOSIS — F51.04 CHRONIC INSOMNIA: ICD-10-CM

## 2022-10-13 DIAGNOSIS — Z86.711 HISTORY OF PULMONARY EMBOLUS (PE): ICD-10-CM

## 2022-10-13 DIAGNOSIS — I51.9 RIGHT VENTRICULAR DYSFUNCTION: Primary | ICD-10-CM

## 2022-10-13 DIAGNOSIS — M33.29: ICD-10-CM

## 2022-10-13 DIAGNOSIS — F17.201 TOBACCO ABUSE, IN REMISSION: ICD-10-CM

## 2022-10-13 DIAGNOSIS — G47.33 OSA (OBSTRUCTIVE SLEEP APNEA): ICD-10-CM

## 2022-10-13 DIAGNOSIS — I27.21 PULMONARY ARTERIAL HYPERTENSION: ICD-10-CM

## 2022-10-13 DIAGNOSIS — J96.12 CHRONIC RESPIRATORY FAILURE WITH HYPERCAPNIA: ICD-10-CM

## 2022-10-13 PROCEDURE — 99213 OFFICE O/P EST LOW 20 MIN: CPT | Performed by: NURSE PRACTITIONER

## 2022-10-13 RX ORDER — ZOLPIDEM TARTRATE 10 MG/1
10 TABLET ORAL NIGHTLY PRN
Qty: 30 TABLET | Refills: 0 | OUTPATIENT
Start: 2022-10-13

## 2022-10-13 RX ORDER — MACITENTAN 10 MG/1
10 TABLET, FILM COATED ORAL DAILY
COMMUNITY
Start: 2022-09-19

## 2022-10-13 RX ORDER — HYDROXYCHLOROQUINE SULFATE 200 MG/1
200 TABLET, FILM COATED ORAL DAILY
COMMUNITY
Start: 2022-09-16

## 2022-10-13 RX ORDER — ZOLPIDEM TARTRATE 10 MG/1
10 TABLET ORAL NIGHTLY PRN
Qty: 30 TABLET | Refills: 2 | Status: SHIPPED | OUTPATIENT
Start: 2022-10-13 | End: 2022-12-01 | Stop reason: SDUPTHER

## 2022-10-13 NOTE — PROGRESS NOTES
Primary Care Provider  Jose Adorno MD     Referring Provider  No ref. provider found     Chief Complaint  ILD and Follow-up (Patient needs a refill on Ambien. )    Subjective          History of Presenting Illness  Patient is a 57-year-old male, patient of Dr. Malhotra who presents for management of chronic insomnia, interstitial lung disease, obesity hypoventilation syndrome, obstructive sleep apnea, and chronic respiratory failure and has severe primary pulmonary hypertension likely related to polymyositis who presents for a follow-up visit today.  Patient states that he was recently hospitalized at [facility] for 8 days for congestive heart failure and medications were just.  Patient states that he is now on Opsumit for pulmonary hypertension.  Patient states that he is under the care of the pulmonary clinic at [facility] for pulmonary hypertension.  Patient states that he is scheduled to have an ablation next week by Dr. Schrader and then is scheduled to follow back up for an echocardiogram and a cardiac catheterization on 11/15/2022.  Patient states that since hospitalization at [place] his shortness of breath has improved.  Patient states that he does get short of breath that is worse with exertion, mild to moderate severity, and improved with rest.  Patient states that he is using his CPAP machine at night which helps him to sleep.  Patient is here today needing a refill on his Ambien for insomnia.  Patient states that Ambien does help him to sleep as well.  Patient states that he is also under the care of Dr. Clayton, rheumatologist and is on Plaquenil for polymyositis and rheumatoid arthritis. Patient denies fever, chills, night sweats, swollen glands in the head and neck, unintentional weight loss, hemoptysis, purulent sputum production, dysphagia, chest pain, palpitations, chest tightness, abdominal pain, nausea, vomiting, and diarrhea.  Patient also denies any myalgias,  changes in sense of taste and/or smell, sore throat, any other coronavirus or flu-like symptoms.  Patient denies any orthopnea or paroxysmal nocturnal dyspnea.  Patient also denies any hematuria, hematochezia, hematemesis, and epistaxis.  Patient is able to perform activities of daily living.        Review of Systems   Constitutional: Negative for activity change, appetite change, chills, diaphoresis, fatigue, fever, unexpected weight gain and unexpected weight loss.        Negative for Insomnia   HENT: Negative for congestion (Nasal), mouth sores, nosebleeds, postnasal drip, sore throat, swollen glands and trouble swallowing.         Negative for Thrush  Negative for Hoarseness  Negative for Allergies/Hay Fever  Negative for Recent Head injury  Negative for Ear Fullness  Negative for Nasal or Sinus pain  Negative for Dry lips  Negative for Nasal discharge   Respiratory: Positive for shortness of breath. Negative for apnea, cough, chest tightness and wheezing.         Negative for Hemoptysis  Negative for Pleuritic pain   Cardiovascular: Positive for leg swelling (Improved with diuretics per patient report). Negative for chest pain and palpitations.        Negative for Claudication  Negative for Cyanosis  Negative for Dyspnea on exertion   Gastrointestinal: Negative for abdominal pain, diarrhea, nausea, vomiting and GERD.   Musculoskeletal: Negative for joint swelling and myalgias.        Negative for Joint pain  Negative for Joint stiffness   Skin: Negative for color change, dry skin, pallor and rash.   Neurological: Negative for syncope, weakness and headache.   Hematological: Negative for adenopathy. Does not bruise/bleed easily.        Family History   Problem Relation Age of Onset   • Heart failure Mother    • Lupus Mother    • Stroke Mother    • Cancer Father    • Diabetes Brother         Social History     Socioeconomic History   • Marital status:    Tobacco Use   • Smoking status: Former      "Packs/day: 1.00     Years: 30.00     Pack years: 30.00     Types: Cigarettes     Start date: 1981     Quit date: 2015     Years since quittin.7   • Smokeless tobacco: Never   Vaping Use   • Vaping Use: Never used   Substance and Sexual Activity   • Alcohol use: Yes     Comment: occassionally   • Drug use: Never   • Sexual activity: Defer        Past Medical History:   Diagnosis Date   • Abnormal ECG    • Arrhythmia    • Atrial flutter (HCC)    • CHF (congestive heart failure) (HCC)    • Hypertension    • Myocardial infarction (HCC)    • Pulmonary embolism (HCC)    • Sleep apnea, obstructive         Immunization History   Administered Date(s) Administered   • COVID-19 (Bread) 2021   • Flu Vaccine Intradermal Quad 18-64YR 10/01/2022   • Flu Vaccine Quad PF >36MO 2018, 10/18/2019, 2020   • FluLaval/Fluzone >6mos 10/26/2021, 10/26/2021, 2022, 2022   • Influenza Split Preservative Free ID 10/02/2020   • Tdap 10/22/2021       Allergies   Allergen Reactions   • Beta Adrenergic Blockers Nausea Only and Other (See Comments)     \"Made really sick/couldn't eat\"  Lightheaded, sweaty, nauseous, can not focus on anything           Current Outpatient Medications:   •  Eliquis 5 MG tablet tablet, Take 1 tablet by mouth twice daily, Disp: 180 tablet, Rfl: 3  •  epoprostenol (FLOLAN) 1.5 MG injection, Infuse 1.5 mg into a venous catheter., Disp: , Rfl:   •  Euthyrox 25 MCG tablet, Daily., Disp: , Rfl:   •  hydroxychloroquine (PLAQUENIL) 200 MG tablet, , Disp: , Rfl:   •  omeprazole (priLOSEC) 20 MG capsule, Take 20 mg by mouth Daily., Disp: , Rfl:   •  Opsumit 10 MG tablet, , Disp: , Rfl:   •  predniSONE (DELTASONE) 10 MG tablet, , Disp: , Rfl:   •  tadalafil (ADCIRCA) 20 MG tablet tablet, , Disp: , Rfl:   •  zolpidem (AMBIEN) 10 MG tablet, Take 1 tablet by mouth At Night As Needed for Sleep., Disp: 30 tablet, Rfl: 2  •  bumetanide (BUMEX) 2 MG tablet, Take 1 mg by mouth 2 (Two) Times a Day., " "Disp: , Rfl:   •  HYDROcodone-acetaminophen (NORCO)  MG per tablet, TAKE 1/2 TO 1 (ONE-HALF TO ONE) TABLET BY MOUTH EVERY 6 HOURS AS NEEDED FOR PAIN, Disp: , Rfl:   •  metOLazone (ZAROXOLYN) 5 MG tablet, Take 1 tablet by mouth 2 (Two) Times a Day., Disp: 180 tablet, Rfl: 3  •  potassium chloride (K-DUR,KLOR-CON) 20 MEQ CR tablet, , Disp: , Rfl:   •  spironolactone (ALDACTONE) 50 MG tablet, , Disp: , Rfl:      Objective     Physical Exam  Vital Signs:   WDWN, Alert, NAD.    HEENT:  PERRL, EOMI.  OP, nares clear, no sinus tenderness  Neck:  Supple, no JVD, no thyromegaly.  Lymph: no axillary, cervical, supraclavicular lymphadenopathy noted bilaterally  Chest:  good aeration, clear to auscultation bilaterally, tympanic to percussion bilaterally, no work of breathing noted  CV: RRR, no MGR, pulses 2+, equal.  Abd:  Soft, NT, ND, + BS, no HSM  EXT:  no clubbing, no cyanosis, no edema, no joint tenderness  Neuro:  A&Ox3, CN grossly intact, no focal deficits.  Skin: No rashes or lesions noted.    /80 (BP Location: Left arm, Patient Position: Sitting, Cuff Size: Adult)   Pulse 84   Temp 98.6 °F (37 °C) (Tympanic)   Resp 12   Ht 172.7 cm (67.99\")   Wt 82 kg (180 lb 12.8 oz)   SpO2 96% Comment: room air  BMI 27.50 kg/m²         Result Review :   I have reviewed Dr. Geronimo's last office visit note.    Procedures:         Assessment and Plan      Assessment:  1.  Interstitial lung disease.  2.  Chronic hypercapnic respiratory failure.  3.  History of pulmonary embolism.  4.  Right ventricular dysfunction.  5.  Pulmonary arterial hypertension.  6.  Obstructive sleep apnea  7.  Chronic insomnia.  8.  Polymyositis.  9.  Rheumatoid arthritis.  Patient is under the care of Dr. Clayton.  10.  Tobacco abuse of cigarettes in remission.        Plan:  1.  Ambien refilled in the office today.  How to take medication and possible side effects of medication discussed with the patient.  Patient verbalized understanding and " compliance.  Angel Luis ordered today, reviewed, and consistent.  2.  Patient to continue to follow with pulmonary hypertension clinic at [place].  3.  Continue DuoNeb nebulizer treatments as needed.  4.  Continue Eliquis.  5.  Follow-up with cardiologist as scheduled  6.  Continue oxygen to keep SPO2 at 89% and above.  7.  Continue CPAP at night and with naps at current settings and clean mask and tubing daily.  8.  Vaccination status: patient reports they are up-to-date with flu, and COVID-vaccine.  Patient is advised to check on the status of his pneumonia vaccine with his primary care provider and to notify our office.  Patient is advised to continue to follow CDC recommendations such as social distancing wearing a mask and washing hands for at least 20 seconds.  9.  Smoking status: Patient is a former cigarette smoker.  10.  Patient to call the office, 911, or go to the ER with new or worsening symptoms.  11.  Follow-up with Dr. Geronimo as scheduled December 2022, sooner if needed.              Follow Up   Return for follow up with Dr. Geronimo as scheduled in December 2022.  Patient was given instructions and counseling regarding his condition or for health maintenance advice. Please see specific information pulled into the AVS if appropriate.

## 2022-11-01 NOTE — PROGRESS NOTES
Electrophysiology Clinic Consult     Braxton B Taz  1965  [unfilled]  [unfilled]    11/08/22    DATE OF ADMISSION: (Not on file)  [facility] CARDIOLOGY Jose Monzon MD  [address] [facility] [id number]  Referring Provider: Jayce Plummer MD     Chief Complaint   Patient presents with   • Irregular Heart Beat     Problem List:  1. Typical Atrial flutter  a. CHADSvasc 1 = Eliquis   b. External cardioversion July 2020-ANKUR 7/13/2020: EF 35 to 40% with moderate global hypokinesis, moderate to severely dilated RV with moderate to severely reduced right ventricular systolic function, no evidence of left atrial appendage thrombus, severely dilated right atrium, mild MR, moderate to severe TR  c. External cardioversion 8/12/2022 from right atrial flutter to normal sinus rhythm  2. Pulmonary hypertension  a. Followed at [place] pulmonary hypertension clinic, treated with Veletri  b. Echocardiogram 3/4/2020: Technically limited study, adequate left ventricular systolic function with wall motion abnormalities and underlying left ventricular hypertrophy, trace AR, trace MR, mild TR, severe pulmonary hypertension, enlarged right atrium and right ventricle  c. ANKUR 7/13/2020: EF 35 to 40% with moderate global hypokinesis, moderate to severely dilated RV with moderate to severely reduced right ventricular systolic function, no evidence of left atrial appendage thrombus, severely dilated right atrium, mild MR, moderate to severe TR  d. Echocardiogram 4/5/2022: LVEF 55 to 60%, severely dilated right ventricle with moderate to severely depressed systolic function and pressure overload pattern of interventricular septum, severe right atrial enlargement, severe TR with hepatic vein flow reversal, RVSP 92 mmHg, no evidence of interatrial shunt  e. Echocardiogram 9/13/2022: Normal LV size and function EF 55 to 60%, severely dilated RV with moderate to severely depressed systolic  function and pressure overload pattern of interventricular septum, severe right atrial enlargement, severe TR, RVSP 48 mmHg  f. [facility] 9/2022 deficient data [place]   3. Hypertension  4. History of PE  5. Obstructive sleep apnea  6. Previous tobacco abuse  7. Surgical history  a. None    EKG 8/12/2022: atrial flutter 145 bpm       History of Present Illness:   Braxton Mcghee is a pleasant 57-year-old male with above-stated past medical history who presents today in consultation, referred by Jayce Plummer MD for further evaluation and management of atrial flutter.  He was diagnosed with atrial flutter in 2020 and there went ANKUR guided cardioversion at that time with return to normal sinus rhythm.  Clinically he is remained in normal sinus rhythm and has not had recurrence until August 2022 when he was seen in clinic he was found to be in atrial flutter at 145 bpm.  He underwent external cardioversion on 8/12/2022 to normal sinus rhythm.  He has long history of pulmonary hypertension and is followed at [place] pulmonary hypertension clinic and is treated with Veletri.  Most recent sent echocardiogram on 9/13/2022 showed normal LV size and function with a EF of 55 to 60% with severely dilated RV and moderate to severely depressed systolic function of the RV.  When in atrial flutter, he feels anxious and fidgety but denies palpitations, dizzy, LH and he has had syncopal episodes. Last one was 1 year ago. No symptoms leading up to syncope. He had 4 episodes of syncope last year most likely due to hypoxia.   He started on Veletri for interstitial lung disease one year ago. Has been off oxygen for 6 weeks.   He has not had anymore atrial flutter since august 2022.   His blood pressure has been well controlled.   Caffeine: 2 cups coffee  Alcohol: a couple shots of bourbon 3 times per week   Nicotine: none, Stopped 2015  Stimulant: none      Allergies   Allergen Reactions   • Beta Adrenergic Blockers Nausea Only and Other  "(See Comments)     \"Made really sick/couldn't eat\"  Lightheaded, sweaty, nauseous, can not focus on anything         Cannot display prior to admission medications because the patient has not been admitted in this contact.            Current Outpatient Medications:   •  bumetanide (BUMEX) 1 MG tablet, Take 1 tablet by mouth 2 (Two) Times a Day., Disp: , Rfl:   •  Eliquis 5 MG tablet tablet, Take 1 tablet by mouth twice daily, Disp: 180 tablet, Rfl: 3  •  epoprostenol (FLOLAN) 1.5 MG injection, Infuse 1.5 mg into a venous catheter., Disp: , Rfl:   •  Euthyrox 25 MCG tablet, Take 1 tablet by mouth Daily., Disp: , Rfl:   •  HYDROcodone-acetaminophen (NORCO)  MG per tablet, TAKE 1/2 TO 1 (ONE-HALF TO ONE) TABLET BY MOUTH EVERY 6 HOURS AS NEEDED FOR PAIN, Disp: , Rfl:   •  hydroxychloroquine (PLAQUENIL) 200 MG tablet, Take 1 tablet by mouth Daily., Disp: , Rfl:   •  metOLazone (ZAROXOLYN) 5 MG tablet, Take 1 tablet by mouth 2 (Two) Times a Day. (Patient taking differently: Take 1 tablet by mouth As Needed.), Disp: 180 tablet, Rfl: 3  •  omeprazole (priLOSEC) 20 MG capsule, Take 20 mg by mouth Daily., Disp: , Rfl:   •  Opsumit 10 MG tablet, Take 10 mg by mouth Daily., Disp: , Rfl:   •  potassium chloride (K-DUR,KLOR-CON) 20 MEQ CR tablet, Take 1 tablet by mouth As Needed., Disp: , Rfl:   •  predniSONE (DELTASONE) 10 MG tablet, Take 7.5 mg by mouth Daily., Disp: , Rfl:   •  spironolactone (ALDACTONE) 25 MG tablet, Take 1 tablet by mouth Daily., Disp: , Rfl:   •  tadalafil (ADCIRCA) 20 MG tablet tablet, Take 1 tablet by mouth 2 (Two) Times a Day., Disp: , Rfl:   •  zolpidem (AMBIEN) 10 MG tablet, Take 1 tablet by mouth At Night As Needed for Sleep., Disp: 30 tablet, Rfl: 2    Social History     Socioeconomic History   • Marital status:    Tobacco Use   • Smoking status: Former     Packs/day: 1.00     Years: 30.00     Pack years: 30.00     Types: Cigarettes     Start date: 8/12/1981     Quit date: 11/15/2015     " "Years since quittin.9   • Smokeless tobacco: Never   Vaping Use   • Vaping Use: Never used   Substance and Sexual Activity   • Alcohol use: Yes     Comment: occassionally   • Drug use: Never   • Sexual activity: Defer       Family History   Problem Relation Age of Onset   • Heart failure Mother    • Lupus Mother    • Stroke Mother    • Cancer Father    • Diabetes Brother    mother: CHF       REVIEW OF SYSTEMS:   CONST:  No weight loss, fever, chills, +weakness +fatigue.   HEENT:  No visual loss, blurred vision, double vision, yellow sclerae.                   No hearing loss, congestion, sore throat.   SKIN:      No rashes, urticaria, ulcers, sores.     RESP:     + shortness of breath, -hemoptysis, cough, sputum.   GI:           No anorexia, nausea, vomiting, diarrhea. No abdominal pain, melena.   :         No burning on urination, hematuria or increased frequency.  ENDO:    No diaphoresis, cold or heat intolerance. No polyuria or polydipsia.   NEURO:  No headache, +dizziness, +syncope, -paralysis, ataxia, or parasthesias.                  No change in bowel or bladder control. No history of CVA/TIA  MUSC:    No muscle, back pain, joint pain or stiffness.   HEME:    No anemia, bleeding, bruising. + PE   PSYCH:  No history of depression, anxiety    Vitals:    22 1407   BP: 126/78   BP Location: Left arm   Patient Position: Sitting   Pulse: 92   SpO2: 97%   Weight: 74.8 kg (165 lb)   Height: 172.7 cm (68\")                 Physical Exam:  GEN: Well nourished, well-developed, no acute distress  HEENT: Normocephalic, atraumatic, PERRLA, moist mucous membranes  NECK: Supple, NO JVD, no thyromegaly, no lymphadenopathy  CARD: S1S2, RRR,Loud S2, + TR murmur  LUNGS: Clear to auscultation, normal respiratory effort  ABDOMEN: Soft, nontender, normal bowel sounds  EXTREMITIES: No gross deformities, no clubbing, cyanosis, or + mild BLE edema  SKIN: Warm, dry  NEURO: No focal deficits, alert and oriented x " 3  PSYCHIATRIC: Normal affect and mood      I personally viewed and interpreted the patient's EKG/Telemetry/lab data    Lab Results   Component Value Date    GLUCOSE 105 (H) 07/14/2020    CALCIUM 8.6 09/20/2022     (L) 09/20/2022    K 4.2 09/20/2022    CO2 23 09/20/2022    CL 95 (L) 09/20/2022    BUN 12 09/20/2022    CREATININE 0.73 09/20/2022    BCR 27 (H) 07/14/2020    ANIONGAP 6 09/20/2022     Lab Results   Component Value Date    WBC 7.21 07/13/2020    HGB 14.2 07/13/2020    HCT 45.0 07/13/2020    MCV 97.4 (H) 07/13/2020     07/13/2020     Lab Results   Component Value Date    INR 1.0 09/12/2022    INR 1.1 10/22/2021    PROTIME 13.2 09/12/2022    PROTIME 14.1 10/22/2021     Lab Results   Component Value Date    TSH 2.894 09/14/2022             ECG 12 Lead    Date/Time: 11/8/2022 2:58 PM  Performed by: Jacky Schrader MD  Authorized by: Jacky Schrader MD   Comparison: compared with previous ECG from 8/12/2022  Similar to previous ECG  Comparison to previous ECG: Normal sinus rhythm has replaced sinus tachycardia  Rhythm: sinus rhythm  Rate: normal  BPM: 92                ICD-10-CM ICD-9-CM   1. Atrial flutter with rapid ventricular response (HCC)  I48.92 427.32   2. Pulmonary hypertension ([facility])  I27.20 416.8       Assessment and Plan:   1. Atrial flutter  -CHADSvasc: 1 - on Eliquis   - Has had 2 episodes of atrial flutter in the last 2 years with symptoms of feeling anxious and fidgety. Cardioverted back to NSR most recently in august 2022.   -Discussed with him option of RFA of atrial flutter. The risks, benefits, and alternatives of the procedure have been reviewed and the patient wishes to proceed. No meds to hold.       2. Pulmonary hypertension  -Followed by [facility]   -Most recent echocardiogram 9/13/2022: Normal LV size and function EF 55 to 60%, severely dilated RV with moderate to severely depressed systolic function and pressure overload pattern of interventricular septum, severe  right atrial enlargement, severe TR, RVSP 48 mmHg    Scribed for Jacky Schrader MD by Lynnette SOW 11/8/2022  15:20 EST    I, Jacky Schrader MD, personally performed the services described in this documentation as scribed by the above named individual in my presence, and it is both accurate and complete.  11/8/2022  15:19 EST

## 2022-11-08 ENCOUNTER — OFFICE VISIT (OUTPATIENT)
Dept: CARDIOLOGY | Facility: CLINIC | Age: 57
End: 2022-11-08

## 2022-11-08 VITALS
OXYGEN SATURATION: 97 % | HEART RATE: 92 BPM | WEIGHT: 165 LBS | HEIGHT: 68 IN | SYSTOLIC BLOOD PRESSURE: 126 MMHG | BODY MASS INDEX: 25.01 KG/M2 | DIASTOLIC BLOOD PRESSURE: 78 MMHG

## 2022-11-08 DIAGNOSIS — I27.20 PULMONARY HYPERTENSION: ICD-10-CM

## 2022-11-08 DIAGNOSIS — I48.92 ATRIAL FLUTTER WITH RAPID VENTRICULAR RESPONSE: Primary | ICD-10-CM

## 2022-11-08 PROCEDURE — 99204 OFFICE O/P NEW MOD 45 MIN: CPT | Performed by: INTERNAL MEDICINE

## 2022-11-08 PROCEDURE — 93000 ELECTROCARDIOGRAM COMPLETE: CPT | Performed by: INTERNAL MEDICINE

## 2022-11-23 ENCOUNTER — TELEPHONE (OUTPATIENT)
Dept: CARDIOLOGY | Facility: CLINIC | Age: 57
End: 2022-11-23

## 2022-11-23 NOTE — TELEPHONE ENCOUNTER
Patient called and left a voicemail stating that he is off of Veletri now, and Velez line has been removed. He was wondering when he could proceed with flutter ablatio (orders placed on 11/8/22).

## 2022-12-01 ENCOUNTER — OFFICE VISIT (OUTPATIENT)
Dept: PULMONOLOGY | Facility: CLINIC | Age: 57
End: 2022-12-01

## 2022-12-01 VITALS
TEMPERATURE: 97.8 F | RESPIRATION RATE: 16 BRPM | DIASTOLIC BLOOD PRESSURE: 85 MMHG | HEIGHT: 68 IN | WEIGHT: 183.8 LBS | BODY MASS INDEX: 27.86 KG/M2 | OXYGEN SATURATION: 97 % | SYSTOLIC BLOOD PRESSURE: 136 MMHG | HEART RATE: 80 BPM

## 2022-12-01 DIAGNOSIS — J84.9 ILD (INTERSTITIAL LUNG DISEASE): ICD-10-CM

## 2022-12-01 DIAGNOSIS — G47.33 OSA (OBSTRUCTIVE SLEEP APNEA): ICD-10-CM

## 2022-12-01 DIAGNOSIS — I27.21 PULMONARY ARTERIAL HYPERTENSION: ICD-10-CM

## 2022-12-01 DIAGNOSIS — M33.29: ICD-10-CM

## 2022-12-01 DIAGNOSIS — J96.12 CHRONIC RESPIRATORY FAILURE WITH HYPERCAPNIA: ICD-10-CM

## 2022-12-01 DIAGNOSIS — F17.201 TOBACCO ABUSE, IN REMISSION: ICD-10-CM

## 2022-12-01 DIAGNOSIS — I51.9 RIGHT VENTRICULAR DYSFUNCTION: ICD-10-CM

## 2022-12-01 DIAGNOSIS — Z86.711 HISTORY OF PULMONARY EMBOLUS (PE): ICD-10-CM

## 2022-12-01 DIAGNOSIS — F51.04 CHRONIC INSOMNIA: ICD-10-CM

## 2022-12-01 DIAGNOSIS — I48.92 ATRIAL FLUTTER WITH RAPID VENTRICULAR RESPONSE: Primary | ICD-10-CM

## 2022-12-01 PROCEDURE — 99214 OFFICE O/P EST MOD 30 MIN: CPT | Performed by: INTERNAL MEDICINE

## 2022-12-01 RX ORDER — ZOLPIDEM TARTRATE 10 MG/1
10 TABLET ORAL NIGHTLY PRN
Qty: 30 TABLET | Refills: 2 | Status: SHIPPED | OUTPATIENT
Start: 2022-12-01

## 2022-12-01 RX ORDER — EPOPROSTENOL 0.5 MG/10ML
INJECTION, POWDER, LYOPHILIZED, FOR SOLUTION INTRAVENOUS
Status: ON HOLD | COMMUNITY
Start: 2022-11-09 | End: 2022-12-28

## 2022-12-01 RX ORDER — PREDNISONE 1 MG/1
5 TABLET ORAL DAILY
COMMUNITY
Start: 2022-11-17

## 2022-12-01 RX ORDER — BUMETANIDE 1 MG/1
1 TABLET ORAL DAILY
COMMUNITY
Start: 2022-11-15 | End: 2023-05-15

## 2022-12-01 RX ORDER — SPIRONOLACTONE 50 MG/1
25 TABLET, FILM COATED ORAL
COMMUNITY
Start: 2022-11-26 | End: 2022-12-01 | Stop reason: SDUPTHER

## 2022-12-01 NOTE — PROGRESS NOTES
Primary Care Provider  Jose Adorno MD     Referring Provider  No ref. provider found     Chief Complaint  ILD, Sleep Apnea, and Follow-up    Subjective          Braxton Mcghee presents to [facility] PULMONARY & CRITICAL CARE MEDICINE  Sleep Apnea    Shortness of Breath      Braxton Mcghee is a 57 y.o. male patient with severe primary pulmonary hypertension, likely related to polymyositis, interstitial lung disease, obstructive sleep apnea, obesity hypoventilation syndrome, chronic congestive heart failure with low ejection fraction and chronic respiratory failure.  He is here for follow-up.  Since his last office visit, he was admitted to [facility] [place] with issues with Veletri infusion.  He had to have paracentesis done twice apparently.  His Veletri infusion was slowly decreased and was started by November 16, 2022.  Echocardiogram done prior to that showed significant improvement in his right heart pressures and volume.  Continues to be on Lasix.  Has lost weight, down to 186 pounds.  Currently not using CPAP with issues. He is currently using tadalafil 40 mg once daily and Opsumit 10 mg once daily.  He is currently on Bumex daily and is on metolazone as needed.  In August 2022, was seen by cardiology service and had cardioversion done in hospital.  It caused him to have some issues with his Veletri infusion too.  He walks on treadmill more than a mile 2-3 times a week.  He is also on Eliquis 5 mg twice daily.  He has been on prednisone at 10 mg once daily and hydroxychloroquine for his polymyositis.  His breathing is better.  He is able to move around more and is active.  He still has to pace himself with activities.  He feels like his cough has significantly improved.  No nausea or vomiting.  No flushing or headache.  No significant chest pain or chest tightness.      Review of Systems   Respiratory: Positive for shortness of breath.    General:  Fatigue, No Fever No weight  loss or loss of appetite  HEENT: No dysphagia, No Visual Changes, no rhinorrhea  Respiratory: Improving cough,+Dyspnea, minimal phlegm, No Pleuritic Pain, no wheezing, no hemoptysis.  Cardiovascular: Denies chest pain, denies palpitations,+FLORENTINO, No Chest Pressure  Gastrointestinal:  No Abdominal Pain, No Nausea, No Vomiting, No Diarrhea  Genitourinary:  No Dysuria, No Frequency, No Hesitancy  Musculoskeletal: No muscle pain, has bilateral leg swelling  Endocrine:  No Heat Intolerance, No Cold Intolerance, No Fatigue  Hematologic:  No Bleeding, No Bruising  Psychiatric:  No Anxiety, No Depression  Neurologic:  No Confusion, no Dysarthria, No Headaches  Skin:  No Rash, No Open Wounds, trace lower extremity edema    Family History   Problem Relation Age of Onset   • Heart failure Mother    • Lupus Mother    • Stroke Mother    • Cancer Father    • Diabetes Brother         Social History     Socioeconomic History   • Marital status:    Tobacco Use   • Smoking status: Former     Packs/day: 1.00     Years: 30.00     Pack years: 30.00     Types: Cigarettes     Start date: 1981     Quit date: 11/15/2015     Years since quittin.0   • Smokeless tobacco: Never   Vaping Use   • Vaping Use: Never used   Substance and Sexual Activity   • Alcohol use: Yes     Comment: occassionally   • Drug use: Never   • Sexual activity: Defer        Past Medical History:   Diagnosis Date   • Abnormal ECG    • Arrhythmia    • Atrial flutter (HCC)    • CHF (congestive heart failure) (HCC)    • Hypertension    • Myocardial infarction (HCC)    • Pulmonary embolism (HCC)    • Sleep apnea, obstructive         Immunization History   Administered Date(s) Administered   • COVID-19 ([employer]) 2021   • Flu Vaccine Intradermal Quad 18-64YR 10/01/2022   • Flu Vaccine Quad PF >36MO 2018, 10/18/2019, 2020   • FluLaval/Fluzone >6mos 10/26/2021, 10/26/2021, 2022, 2022   • Influenza Split Preservative Free ID 10/02/2020,  "10/26/2021, 09/20/2022   • Tdap 10/22/2021         Allergies   Allergen Reactions   • Beta Adrenergic Blockers Nausea Only and Other (See Comments)     \"Made really sick/couldn't eat\"  Lightheaded, sweaty, nauseous, can not focus on anything           Current Outpatient Medications:   •  bumetanide (BUMEX) 1 MG tablet, Take 1 mg by mouth Daily., Disp: , Rfl:   •  Eliquis 5 MG tablet tablet, Take 1 tablet by mouth twice daily, Disp: 180 tablet, Rfl: 3  •  Euthyrox 25 MCG tablet, Take 1 tablet by mouth Daily., Disp: , Rfl:   •  hydroxychloroquine (PLAQUENIL) 200 MG tablet, Take 1 tablet by mouth Daily., Disp: , Rfl:   •  omeprazole (priLOSEC) 20 MG capsule, Take 20 mg by mouth Daily., Disp: , Rfl:   •  Opsumit 10 MG tablet, Take 10 mg by mouth Daily., Disp: , Rfl:   •  predniSONE (DELTASONE) 5 MG tablet, Take 5 mg by mouth Daily., Disp: , Rfl:   •  spironolactone (ALDACTONE) 25 MG tablet, Take 1 tablet by mouth Daily., Disp: , Rfl:   •  tadalafil (ADCIRCA) 20 MG tablet tablet, Take 1 tablet by mouth 2 (Two) Times a Day., Disp: , Rfl:   •  zolpidem (AMBIEN) 10 MG tablet, Take 1 tablet by mouth At Night As Needed for Sleep., Disp: 30 tablet, Rfl: 2  •  epoprostenol (FLOLAN) 1.5 MG injection, Infuse 1.5 mg into a venous catheter., Disp: , Rfl:   •  HYDROcodone-acetaminophen (NORCO)  MG per tablet, TAKE 1/2 TO 1 (ONE-HALF TO ONE) TABLET BY MOUTH EVERY 6 HOURS AS NEEDED FOR PAIN, Disp: , Rfl:   •  metOLazone (ZAROXOLYN) 5 MG tablet, Take 1 tablet by mouth 2 (Two) Times a Day. (Patient taking differently: Take 5 mg by mouth As Needed.), Disp: 180 tablet, Rfl: 3  •  potassium chloride (K-DUR,KLOR-CON) 20 MEQ CR tablet, Take 1 tablet by mouth As Needed., Disp: , Rfl:   •  Veletri 0.5 MG injection, , Disp: , Rfl:      Objective   Vital Signs:   /85 (BP Location: Right arm, Patient Position: Sitting, Cuff Size: Adult)   Pulse 80   Temp 97.8 °F (36.6 °C) (Tympanic)   Resp 16   Ht 172.7 cm (68\")   Wt 83.4 kg (183 " lb 12.8 oz)   SpO2 97% Comment: room air/ 2.5 liters oxygen PRN  BMI 27.95 kg/m²     Mallampatti classification : 1  Physical Exam  Vital Signs Reviewed  WDWN, Alert, Normal conversational  HEENT:  PERRL, EOMI.  OP, nares clear, no sinus tenderness  Neck:  Supple, no JVD, no thyromegaly  Lymph: no axillary, cervical, supraclavicular lymphadenopathy noted bilaterally  Chest:  good aeration, bilateral diminished breath sounds, no wheezing, no rhonchi, minimal crackles at bases resonant to percussion b/l  CV: RRR, no MGR, pulses 2+, equal.  Abd:  Soft, NT, ND, + BS, no HSM  EXT:  no clubbing, no cyanosis, trace BLE edema, left lower extremity, on lateral side has open wound, slowly healing  Neuro:  A&Ox3, CN grossly intact, no focal deficits  Skin: No rashes or lesions noted     Result Review :   The following data was reviewed by: Louis Geronimo MD on 12/16/2021:  Common labs    Common Labs 9/18/22 9/19/22 9/20/22   Glucose 88 82 72   BUN 19 14 12   Creatinine 0.86 0.74 0.73   Sodium 125 (A) 128 (A) 124 (A)   Potassium 4.1 4.0 4.2   Chloride 93 (A) 95 (A) 95 (A)   Calcium 8.6 8.3 (A) 8.6   (A) Abnormal value       Comments are available for some flowsheets but are not being displayed.           CMP    CMP 9/18/22 9/19/22 9/20/22   Glucose 88 82 72   BUN 19 14 12   Creatinine 0.86 0.74 0.73   Sodium 125 (A) 128 (A) 124 (A)   Potassium 4.1 4.0 4.2   Chloride 93 (A) 95 (A) 95 (A)   Calcium 8.6 8.3 (A) 8.6   (A) Abnormal value       Comments are available for some flowsheets but are not being displayed.                 Data reviewed: Radiologic studies Previous imaging from outside hospital was reviewed.  Outside office note from pulmonary hypertension clinic reviewed.            Assessment and Plan    Diagnoses and all orders for this visit:    1. Atrial flutter with rapid ventricular response (HCC) (Primary)  -     Polysomnography 4 or more parameters with CPAP; Future    2. Right ventricular dysfunction  -      Polysomnography 4 or more parameters with CPAP; Future    3. History of pulmonary embolus (PE)  -     Polysomnography 4 or more parameters with CPAP; Future    4. Polymyositis with other organ involvement (HCC)    5. ILD (interstitial lung disease) ([facility])  -     Polysomnography 4 or more parameters with CPAP; Future    6. Chronic respiratory failure with hypercapnia (HCC)  -     Polysomnography 4 or more parameters with CPAP; Future    7. Pulmonary arterial hypertension (HCC)  -     Polysomnography 4 or more parameters with CPAP; Future    8. DASIA (obstructive sleep apnea)  -     Polysomnography 4 or more parameters with CPAP; Future  -     zolpidem (AMBIEN) 10 MG tablet; Take 1 tablet by mouth At Night As Needed for Sleep.  Dispense: 30 tablet; Refill: 2    9. Chronic insomnia  -     Polysomnography 4 or more parameters with CPAP; Future  -     zolpidem (AMBIEN) 10 MG tablet; Take 1 tablet by mouth At Night As Needed for Sleep.  Dispense: 30 tablet; Refill: 2    10. Tobacco abuse, in remission  -     Polysomnography 4 or more parameters with CPAP; Future      Pulmonary hypertension: Given the issue with Veletri infusion, was switched to Opsumit 10 mg once daily and tadalafil 40 mg once daily.  Prior to stopping Veletri, his echocardiogram showed significant improvement in pulmonary artery pressure and right heart function.  Repeat echocardiogram is ordered for end of December.  Maintain his dry weight at 185 pounds or less.  Continue with diuretics including metolazone as needed and regular Bumex.    Polymyositis and rheumatoid issues per Dr. Clayton.  Currently on hydroxychloroquine and prednisone.    He has not been using his BiPAP with some issues with leakage as well as waiting on recall.  I will check split-night sleep study with his more than 60 pound weight loss and improvement in his sleep habits and breathing.  Continue with Ambien 10 mg once daily.    Obstructive sleep apnea and obesity hypoventilation  syndrome: We will wait for split-night sleep study results to adjust the CPAP.    Continue oxygen with sleep and activities.  Advised him to keep himself active.  He is able to move around and walk more than a mile and a treadmill without need of oxygen now.    Continue DuoNeb as needed.  Continue with Eliquis.    Follow Up   Return in about 3 months (around 3/1/2023).  Patient was given instructions and counseling regarding his condition or for health maintenance advice. Please see specific information pulled into the AVS if appropriate.       Electronically signed by Louis Geronimo MD, 12/1/2022, 12:35 EST.

## 2022-12-16 ENCOUNTER — HOSPITAL ENCOUNTER (OUTPATIENT)
Facility: HOSPITAL | Age: 57
Setting detail: HOSPITAL OUTPATIENT SURGERY
End: 2022-12-16
Attending: INTERNAL MEDICINE | Admitting: INTERNAL MEDICINE

## 2022-12-16 DIAGNOSIS — I48.92 ATRIAL FLUTTER WITH RAPID VENTRICULAR RESPONSE: ICD-10-CM

## 2022-12-23 ENCOUNTER — PREP FOR SURGERY (OUTPATIENT)
Dept: OTHER | Facility: HOSPITAL | Age: 57
End: 2022-12-23

## 2022-12-23 DIAGNOSIS — I48.92 ATRIAL FLUTTER WITH RAPID VENTRICULAR RESPONSE: Primary | ICD-10-CM

## 2022-12-23 RX ORDER — SODIUM CHLORIDE 9 MG/ML
40 INJECTION, SOLUTION INTRAVENOUS AS NEEDED
Status: CANCELLED | OUTPATIENT
Start: 2022-12-23

## 2022-12-23 RX ORDER — SODIUM CHLORIDE 9 MG/ML
1 INJECTION, SOLUTION INTRAVENOUS CONTINUOUS
Status: CANCELLED | OUTPATIENT
Start: 2022-12-23 | End: 2022-12-23

## 2022-12-23 RX ORDER — SODIUM CHLORIDE 0.9 % (FLUSH) 0.9 %
3 SYRINGE (ML) INJECTION EVERY 12 HOURS SCHEDULED
Status: CANCELLED | OUTPATIENT
Start: 2022-12-23

## 2022-12-23 RX ORDER — SODIUM CHLORIDE 0.9 % (FLUSH) 0.9 %
10 SYRINGE (ML) INJECTION AS NEEDED
Status: CANCELLED | OUTPATIENT
Start: 2022-12-23

## 2022-12-23 RX ORDER — ACETAMINOPHEN 325 MG/1
650 TABLET ORAL EVERY 4 HOURS PRN
Status: CANCELLED | OUTPATIENT
Start: 2022-12-23

## 2022-12-28 ENCOUNTER — HOSPITAL ENCOUNTER (OUTPATIENT)
Facility: HOSPITAL | Age: 57
Discharge: HOME | End: 2022-12-28
Attending: INTERNAL MEDICINE | Admitting: INTERNAL MEDICINE

## 2022-12-28 ENCOUNTER — ANESTHESIA EVENT (OUTPATIENT)
Dept: CARDIOLOGY | Facility: HOSPITAL | Age: 57
End: 2022-12-28

## 2022-12-28 ENCOUNTER — ANESTHESIA (OUTPATIENT)
Dept: CARDIOLOGY | Facility: HOSPITAL | Age: 57
End: 2022-12-28

## 2022-12-28 VITALS
HEIGHT: 68 IN | WEIGHT: 185.8 LBS | DIASTOLIC BLOOD PRESSURE: 85 MMHG | TEMPERATURE: 98 F | OXYGEN SATURATION: 93 % | BODY MASS INDEX: 28.16 KG/M2 | SYSTOLIC BLOOD PRESSURE: 135 MMHG | HEART RATE: 75 BPM | RESPIRATION RATE: 18 BRPM

## 2022-12-28 DIAGNOSIS — I27.21 PULMONARY ARTERIAL HYPERTENSION: ICD-10-CM

## 2022-12-28 DIAGNOSIS — I48.92 ATRIAL FLUTTER WITH RAPID VENTRICULAR RESPONSE: ICD-10-CM

## 2022-12-28 DIAGNOSIS — I48.92 PAROXYSMAL ATRIAL FLUTTER: ICD-10-CM

## 2022-12-28 LAB
ANION GAP SERPL CALCULATED.3IONS-SCNC: 8 MMOL/L (ref 5–15)
BUN SERPL-MCNC: 25 MG/DL (ref 6–20)
BUN/CREAT SERPL: 31.6 (ref 7–25)
CALCIUM SPEC-SCNC: 9.1 MG/DL (ref 8.6–10.5)
CHLORIDE SERPL-SCNC: 97 MMOL/L (ref 98–107)
CO2 SERPL-SCNC: 28 MMOL/L (ref 22–29)
CREAT SERPL-MCNC: 0.79 MG/DL (ref 0.76–1.27)
DEPRECATED RDW RBC AUTO: 46.4 FL (ref 37–54)
EGFRCR SERPLBLD CKD-EPI 2021: 102.3 ML/MIN/1.73
ERYTHROCYTE [DISTWIDTH] IN BLOOD BY AUTOMATED COUNT: 14.6 % (ref 12.3–15.4)
GLUCOSE SERPL-MCNC: 108 MG/DL (ref 65–99)
HBA1C MFR BLD: 4.6 % (ref 4.8–5.6)
HCT VFR BLD AUTO: 36.1 % (ref 37.5–51)
HGB BLD-MCNC: 13.2 G/DL (ref 13–17.7)
MCH RBC QN AUTO: 36.2 PG (ref 26.6–33)
MCHC RBC AUTO-ENTMCNC: 36.6 G/DL (ref 31.5–35.7)
MCV RBC AUTO: 98.9 FL (ref 79–97)
PLATELET # BLD AUTO: 271 10*3/MM3 (ref 140–450)
PMV BLD AUTO: 10.5 FL (ref 6–12)
POTASSIUM SERPL-SCNC: 4.3 MMOL/L (ref 3.5–5.2)
RBC # BLD AUTO: 3.65 10*6/MM3 (ref 4.14–5.8)
SODIUM SERPL-SCNC: 133 MMOL/L (ref 136–145)
WBC NRBC COR # BLD: 9.32 10*3/MM3 (ref 3.4–10.8)

## 2022-12-28 PROCEDURE — 85027 COMPLETE CBC AUTOMATED: CPT | Performed by: PHYSICIAN ASSISTANT

## 2022-12-28 PROCEDURE — C1760 CLOSURE DEV, VASC: HCPCS | Performed by: INTERNAL MEDICINE

## 2022-12-28 PROCEDURE — 25010000002 PROPOFOL 10 MG/ML EMULSION: Performed by: NURSE ANESTHETIST, CERTIFIED REGISTERED

## 2022-12-28 PROCEDURE — 25010000002 DIPHENHYDRAMINE PER 50 MG: Performed by: INTERNAL MEDICINE

## 2022-12-28 PROCEDURE — C1732 CATH, EP, DIAG/ABL, 3D/VECT: HCPCS | Performed by: INTERNAL MEDICINE

## 2022-12-28 PROCEDURE — 93653 COMPRE EP EVAL TX SVT: CPT | Performed by: INTERNAL MEDICINE

## 2022-12-28 PROCEDURE — 99153 MOD SED SAME PHYS/QHP EA: CPT | Performed by: INTERNAL MEDICINE

## 2022-12-28 PROCEDURE — C1894 INTRO/SHEATH, NON-LASER: HCPCS | Performed by: INTERNAL MEDICINE

## 2022-12-28 PROCEDURE — 93623 PRGRMD STIMJ&PACG IV RX NFS: CPT | Performed by: INTERNAL MEDICINE

## 2022-12-28 PROCEDURE — 83036 HEMOGLOBIN GLYCOSYLATED A1C: CPT | Performed by: PHYSICIAN ASSISTANT

## 2022-12-28 PROCEDURE — 80048 BASIC METABOLIC PNL TOTAL CA: CPT | Performed by: PHYSICIAN ASSISTANT

## 2022-12-28 PROCEDURE — 25010000002 MIDAZOLAM PER 1 MG: Performed by: INTERNAL MEDICINE

## 2022-12-28 PROCEDURE — 94660 CPAP INITIATION&MGMT: CPT

## 2022-12-28 PROCEDURE — 25010000002 FENTANYL CITRATE (PF) 50 MCG/ML SOLUTION: Performed by: INTERNAL MEDICINE

## 2022-12-28 PROCEDURE — C1730 CATH, EP, 19 OR FEW ELECT: HCPCS | Performed by: INTERNAL MEDICINE

## 2022-12-28 PROCEDURE — 99152 MOD SED SAME PHYS/QHP 5/>YRS: CPT | Performed by: INTERNAL MEDICINE

## 2022-12-28 RX ORDER — FENTANYL CITRATE 50 UG/ML
50 INJECTION, SOLUTION INTRAMUSCULAR; INTRAVENOUS
Status: DISCONTINUED | OUTPATIENT
Start: 2022-12-28 | End: 2022-12-28 | Stop reason: HOSPADM

## 2022-12-28 RX ORDER — LIDOCAINE HYDROCHLORIDE 5 MG/ML
INJECTION, SOLUTION INFILTRATION; PERINEURAL
Status: DISCONTINUED | OUTPATIENT
Start: 2022-12-28 | End: 2022-12-28 | Stop reason: HOSPADM

## 2022-12-28 RX ORDER — ACETAMINOPHEN 325 MG/1
650 TABLET ORAL EVERY 4 HOURS PRN
Status: DISCONTINUED | OUTPATIENT
Start: 2022-12-28 | End: 2022-12-28 | Stop reason: HOSPADM

## 2022-12-28 RX ORDER — MIDAZOLAM HYDROCHLORIDE 1 MG/ML
INJECTION INTRAMUSCULAR; INTRAVENOUS
Status: DISCONTINUED | OUTPATIENT
Start: 2022-12-28 | End: 2022-12-28 | Stop reason: HOSPADM

## 2022-12-28 RX ORDER — ACETAMINOPHEN 650 MG/1
650 SUPPOSITORY RECTAL EVERY 4 HOURS PRN
Status: DISCONTINUED | OUTPATIENT
Start: 2022-12-28 | End: 2022-12-28 | Stop reason: HOSPADM

## 2022-12-28 RX ORDER — SODIUM CHLORIDE 0.9 % (FLUSH) 0.9 %
10 SYRINGE (ML) INJECTION AS NEEDED
Status: DISCONTINUED | OUTPATIENT
Start: 2022-12-28 | End: 2022-12-28 | Stop reason: HOSPADM

## 2022-12-28 RX ORDER — DIPHENHYDRAMINE HYDROCHLORIDE 50 MG/ML
INJECTION INTRAMUSCULAR; INTRAVENOUS
Status: DISCONTINUED | OUTPATIENT
Start: 2022-12-28 | End: 2022-12-28 | Stop reason: HOSPADM

## 2022-12-28 RX ORDER — SODIUM CHLORIDE 9 MG/ML
1 INJECTION, SOLUTION INTRAVENOUS CONTINUOUS
Status: ACTIVE | OUTPATIENT
Start: 2022-12-28 | End: 2022-12-28

## 2022-12-28 RX ORDER — SODIUM CHLORIDE 0.9 % (FLUSH) 0.9 %
3 SYRINGE (ML) INJECTION EVERY 12 HOURS SCHEDULED
Status: DISCONTINUED | OUTPATIENT
Start: 2022-12-28 | End: 2022-12-28 | Stop reason: HOSPADM

## 2022-12-28 RX ORDER — ONDANSETRON 2 MG/ML
4 INJECTION INTRAMUSCULAR; INTRAVENOUS EVERY 6 HOURS PRN
Status: DISCONTINUED | OUTPATIENT
Start: 2022-12-28 | End: 2022-12-28 | Stop reason: HOSPADM

## 2022-12-28 RX ORDER — SODIUM CHLORIDE 9 MG/ML
INJECTION, SOLUTION INTRAVENOUS
Status: DISCONTINUED | OUTPATIENT
Start: 2022-12-28 | End: 2022-12-28 | Stop reason: HOSPADM

## 2022-12-28 RX ORDER — HYDROMORPHONE HYDROCHLORIDE 1 MG/ML
0.5 INJECTION, SOLUTION INTRAMUSCULAR; INTRAVENOUS; SUBCUTANEOUS
Status: DISCONTINUED | OUTPATIENT
Start: 2022-12-28 | End: 2022-12-28 | Stop reason: HOSPADM

## 2022-12-28 RX ORDER — FENTANYL CITRATE 50 UG/ML
INJECTION, SOLUTION INTRAMUSCULAR; INTRAVENOUS
Status: DISCONTINUED | OUTPATIENT
Start: 2022-12-28 | End: 2022-12-28 | Stop reason: HOSPADM

## 2022-12-28 RX ORDER — SODIUM CHLORIDE 9 MG/ML
40 INJECTION, SOLUTION INTRAVENOUS AS NEEDED
Status: DISCONTINUED | OUTPATIENT
Start: 2022-12-28 | End: 2022-12-28 | Stop reason: HOSPADM

## 2022-12-28 RX ADMIN — PROPOFOL 125 MCG/KG/MIN: 10 INJECTION, EMULSION INTRAVENOUS at 14:42

## 2022-12-28 RX ADMIN — SODIUM CHLORIDE 1 ML/KG/HR: 9 INJECTION, SOLUTION INTRAVENOUS at 13:00

## 2022-12-28 NOTE — H&P
Cardiology H&P     Braxton B Taz  1965  [phone]  There is no work phone number on file.    12/28/22    DATE OF ADMISSION: 12/28/2022  [facility]    Jose Adorno MD  [address]  / [facility] [id number]  Referring Provider: Jacky Schrader MD     CC: Typical atrial flutter    Problem List:  1. Typical Atrial flutter  a. CHADSvasc 1 = Eliquis   b. External cardioversion July 2020-ANKUR 7/13/2020: EF 35 to 40% with moderate global hypokinesis, moderate to severely dilated RV with moderate to severely reduced right ventricular systolic function, no evidence of left atrial appendage thrombus, severely dilated right atrium, mild MR, moderate to severe TR  c. External cardioversion 8/12/2022 from right atrial flutter to normal sinus rhythm  2. Pulmonary hypertension  a. Followed at [facility] pulmonary hypertension clinic, treated with Veletri  b. Echocardiogram 3/4/2020: Technically limited study, adequate left ventricular systolic function with wall motion abnormalities and underlying left ventricular hypertrophy, trace AR, trace MR, mild TR, severe pulmonary hypertension, enlarged right atrium and right ventricle  c. ANKUR 7/13/2020: EF 35 to 40% with moderate global hypokinesis, moderate to severely dilated RV with moderate to severely reduced right ventricular systolic function, no evidence of left atrial appendage thrombus, severely dilated right atrium, mild MR, moderate to severe TR  d. Echocardiogram 4/5/2022: LVEF 55 to 60%, severely dilated right ventricle with moderate to severely depressed systolic function and pressure overload pattern of interventricular septum, severe right atrial enlargement, severe TR with hepatic vein flow reversal, RVSP 92 mmHg, no evidence of interatrial shunt  e. Echocardiogram 9/13/2022: Normal LV size and function EF 55 to 60%, severely dilated RV with moderate to severely depressed systolic function and pressure overload pattern of interventricular  "septum, severe right atrial enlargement, severe TR, RVSP 48 mmHg  f. [facility] 9/2022 deficient data [place]   g. Echo 11/15/22: LVEF = 50-55%, mild RV dilation, mild TR, PASP 45 mmHg, significant improvement in RV size/function and TR from last echo  3. Hypertension  4. History of PE  5. Obstructive sleep apnea  6. Previous tobacco abuse  7. Surgical history  a. None    History of Present Illness:   Braxton Mcghee is a 57 year old male with above past medical history presenting for scheduled EPS +/- typical atrial flutter RFA today. He has been cardioverted from atrial flutter to NSR twice within the last two years. He is moderately symptomatic with feelings of anxiety and shakiness when out of rhythm. He has also been following recently with [place] pulmonary hypertension clinic and was being treated with Veletri and at one time was on continuous 6L of O2 NC. Since starting treatment, he has been able to wean off of O2 completely and echo has improved significantly.  He did recently have to be switched from Veletri to Opsumit due to abdominal fluid accumulation and had to have paracentesis.  Since switching in September he has had no further issues with swelling.  He has been compliant with his Eliquis, no missed doses.  He denies chest pain, shortness of breath, recent hospitalization, infection, fevers, chills, symptoms of CVA/TIA.    Allergies   Allergen Reactions   • Beta Adrenergic Blockers Nausea Only and Other (See Comments)     \"Made really sick/couldn't eat\"  Lightheaded, sweaty, nauseous, can not focus on anything         Cannot display prior to admission medications because the patient has not been admitted in this contact.            Current Facility-Administered Medications:   •  acetaminophen (TYLENOL) tablet 650 mg, 650 mg, Oral, Q4H PRN, Carla Long PA  •  sodium chloride 0.9 % flush 10 mL, 10 mL, Intravenous, PRN, Carla Long PA  •  sodium chloride 0.9 % flush 3 mL, 3 mL, Intravenous, " Q12H, Carla Long PA  •  sodium chloride 0.9 % infusion 40 mL, 40 mL, Intravenous, PRN, Carla Long PA  •  sodium chloride 0.9 % infusion, 1 mL/kg/hr (Order-Specific), Intravenous, Continuous, Carla Long PA, Last Rate: 83 mL/hr at 22 1300, 1 mL/kg/hr at 22 1300    Social History     Socioeconomic History   • Marital status:    Tobacco Use   • Smoking status: Former     Packs/day: 1.00     Years: 30.00     Pack years: 30.00     Types: Cigarettes     Start date: 1981     Quit date: 11/15/2015     Years since quittin.1   • Smokeless tobacco: Never   Vaping Use   • Vaping Use: Never used   Substance and Sexual Activity   • Alcohol use: Yes     Comment: occassionally   • Drug use: Never   • Sexual activity: Defer       Family History   Problem Relation Age of Onset   • Heart failure Mother    • Lupus Mother    • Stroke Mother    • Cancer Father    • Diabetes Brother        REVIEW OF SYSTEMS:   CONSTITUTIONAL:         No weight loss, fever, chills, weakness +fatigue.   HEENT:                            No visual loss, blurred vision, double vision, yellow sclerae.                                             No hearing loss, congestion, sore throat.   SKIN:                                No rashes, urticaria, ulcers, sores.     RESPIRATORY:               No shortness of breath, hemoptysis, cough, sputum.   GI:                                     No anorexia, nausea, vomiting, diarrhea. No abdominal pain, melena.   :                                   No burning on urination, hematuria or increased frequency.  ENDOCRINE:                   No diaphoresis, cold or heat intolerance. No polyuria or polydipsia.   NEURO:                            No headache, dizziness, syncope, paralysis, ataxia, or parasthesias.+shakiness                                            No change in bowel or bladder control. No history of CVA/TIA  MUSCULOSKELETAL:    No muscle, back pain, joint pain or  "stiffness.   HEMATOLOGY:               No anemia, bleeding, bruising. No history of DVT/PE.  PSYCH:                            No history of depression, +anxiety    Vitals:    12/28/22 1215 12/28/22 1218   BP: 156/83 154/90   BP Location: Left arm Right arm   Patient Position: Lying Lying   Pulse: 92    Resp: 18    Temp: 97.5 °F (36.4 °C)    TempSrc: Temporal    SpO2: 95% 94%   Weight: 84.3 kg (185 lb 12.8 oz)    Height: 172.7 cm (68\")          Vital Sign Min/Max for last 24 hours  Temp  Min: 97.5 °F (36.4 °C)  Max: 97.5 °F (36.4 °C)   BP  Min: 154/90  Max: 156/83   Pulse  Min: 92  Max: 92   Resp  Min: 18  Max: 18   SpO2  Min: 94 %  Max: 95 %   No data recorded    No intake or output data in the 24 hours ending 12/28/22 1311          Physical Exam:  GEN: Well nourished, Well- developed  No acute distress  HEENT: Normocephalic, Atraumatic, PERRLA, moist mucous membranes  NECK: supple, NO JVD, no thyromegaly, no lymphadenopathy  CARDIAC: S1S2  RRR no murmur, gallop, rub  LUNGS: Clear to ausculation, normal respiratory effort  ABDOMEN: Soft, nontender, normal bowel sounds  EXTREMITIES:No gross deformities,  No clubbing, cyanosis, or edema  SKIN: Warm, dry  NEURO: No focal deficits  PSYCHIATRIC: Normal affect and mood      I personally viewed and interpreted the patient's EKG/Telemetry/lab data    Data:   Results from last 7 days   Lab Units 12/28/22  1224   WBC 10*3/mm3 9.32   HEMOGLOBIN g/dL 13.2   HEMATOCRIT % 36.1*   PLATELETS 10*3/mm3 271     Results from last 7 days   Lab Units 12/28/22  1224   SODIUM mmol/L 133*   POTASSIUM mmol/L 4.3   CHLORIDE mmol/L 97*   CO2 mmol/L 28.0   BUN mg/dL 25*   CREATININE mg/dL 0.79   GLUCOSE mg/dL 108*      Results from last 7 days   Lab Units 12/28/22  1224   HEMOGLOBIN A1C % 4.60*                             No intake or output data in the 24 hours ending 12/28/22 1311      Telemetry: NSR, HR 92 bpm          Assessment and Plan:   1. Typical atrial flutter  -CHADSvasc: 1 - on " Eliquis   - Has had 2 episodes of atrial flutter in the last 2 years requiring ECV with symptoms of feeling anxious and fidgety. Cardioverted back to NSR most recently in august 2022.   -The risks, benefits, and alternatives of EPS +/- atrial flutter ablation have been reviewed and the patient wishes to proceed.    -We will proceed with EPS +/- typical atrial flutter ablation today        2. Pulmonary hypertension  -Followed by [place]   -Most recent echo 11/15/22: LVEF = 50-55%, mild RV dilation, mild TR, PASP 45 mmHg, significant improvement in RV size/function and TR from last echo        Electronically signed by SATISH Ruiz, 12/28/22, 12:59 PM EST.

## 2022-12-28 NOTE — ANESTHESIA PREPROCEDURE EVALUATION
Anesthesia Evaluation                  Airway   Mallampati: I  TM distance: >3 FB  Neck ROM: full  No difficulty expected  Dental      Pulmonary    (+) pulmonary embolism, sleep apnea,   Cardiovascular     (+) hypertension, past MI , dysrhythmias Atrial Flutter,       Neuro/Psych  GI/Hepatic/Renal/Endo    (+)   thyroid problem hypothyroidism    Musculoskeletal     Abdominal    Substance History      OB/GYN          Other                        Anesthesia Plan    ASA 3 - emergent     MAC     (Patient procedure in progress EP staff unable to manage pt and sedation we were called to emergently assume care after pt received sedative medications.)  intravenous induction       Plan discussed with CRNA.        CODE STATUS:

## 2022-12-28 NOTE — ANESTHESIA POSTPROCEDURE EVALUATION
"Patient: Braxton Mcghee    Procedure Summary     Date: 12/28/22 Room / Location: [facility] / [facility]VAS[facility]    Anesthesia Start: 1439 Anesthesia Stop: 1533    Procedure: EP/ABLATION - A FLUTTER Diagnosis:       Atrial flutter with rapid ventricular response (HCC)      (AFL)    Providers: Jacky Schrader MD Provider: Zachery Bah MD    Anesthesia Type: MAC ASA Status: 3 - Emergent          Anesthesia Type: No value filed.    Vitals  No vitals data found for the desired time range.          Post Anesthesia Care and Evaluation    Patient location during evaluation: PACU  Patient participation: complete - patient participated  Level of consciousness: awake and responsive to verbal stimuli  Pain score: 2  Pain management: adequate    Airway patency: patent  Anesthetic complications: No anesthetic complications    Cardiovascular status: acceptable  Respiratory status: acceptable  Hydration status: acceptable    Comments: Pt awake and responsive. SV. VSS. Report to RN. Patient Vitals in the past 24 hrs:  12/28/22 1407, BP:149/78, Pulse:83, Resp:17, SpO2:95 %  12/28/22 1218, BP:154/90, SpO2:94 %  12/28/22 1215, BP:156/83, Temp:97.5 °F (36.4 °C), Temp src:Temporal, Pulse:92, Resp:18, SpO2:95 %, Height:172.7 cm (68\"), Weight:84.3 kg (185 lb 12.8 oz)  133/78. p 72. r 16. t 98.1                  "

## 2022-12-29 ENCOUNTER — CALL CENTER PROGRAMS (OUTPATIENT)
Dept: CALL CENTER | Facility: HOSPITAL | Age: 57
End: 2022-12-29

## 2022-12-29 NOTE — OUTREACH NOTE
PCI/Device Survey    Flowsheet Row Responses   Facility patient discharged from? [facility]   Procedure date 12/28/22   Procedure (if device, specify in description) Ablation   Performing MD Dr. Jacky Schrader   Attempt successful? Yes   Call start time 1211   Call end time 1213   Has the patient had any of the following symptoms since discharge? --  [no symptoms noted]   Is the patient taking prescribed medications: --  [Eliquis]   Nursing intervention Reminded to continue to take prescribed medications   Does the patient have any of the following symptoms related to the cath/surgical site? --  [no symptoms noted]   Nursing intervention Patient education provided   Does the patient have an appointment scheduled with the cardiologist? Yes   Appointment comments f/u with cardiologist Dr. Schrader on 3/14/23   Did the patient feel prepared to go home on the same day as the procedure? Yes   Is the patient satisfied with the same day discharge process? Yes   PCI/Device call completed Yes   Wrap up additional comments Doing well, no questions.          SWAPNA WHITE - Registered Nurse

## 2022-12-29 NOTE — OUTREACH NOTE
PCI/Device Survey    Flowsheet Row Responses   Facility patient discharged from? [facility]   Procedure date 12/28/22   Procedure (if device, specify in description) Ablation   Performing MD Dr. Jacky Schrader   Attempt successful? No   Unsuccessful attempts Attempt 1          SWAPNA WHITE - Registered Nurse

## 2023-01-17 ENCOUNTER — HOSPITAL ENCOUNTER (OUTPATIENT)
Dept: SLEEP MEDICINE | Facility: HOSPITAL | Age: 58
Discharge: HOME | End: 2023-01-17
Admitting: INTERNAL MEDICINE
Payer: COMMERCIAL

## 2023-01-17 DIAGNOSIS — G47.33 OSA (OBSTRUCTIVE SLEEP APNEA): ICD-10-CM

## 2023-01-17 DIAGNOSIS — J96.12 CHRONIC RESPIRATORY FAILURE WITH HYPERCAPNIA: ICD-10-CM

## 2023-01-17 DIAGNOSIS — I51.9 RIGHT VENTRICULAR DYSFUNCTION: ICD-10-CM

## 2023-01-17 DIAGNOSIS — Z86.711 HISTORY OF PULMONARY EMBOLUS (PE): ICD-10-CM

## 2023-01-17 DIAGNOSIS — F51.04 CHRONIC INSOMNIA: ICD-10-CM

## 2023-01-17 DIAGNOSIS — I48.92 ATRIAL FLUTTER WITH RAPID VENTRICULAR RESPONSE: ICD-10-CM

## 2023-01-17 DIAGNOSIS — I27.21 PULMONARY ARTERIAL HYPERTENSION: ICD-10-CM

## 2023-01-17 DIAGNOSIS — J84.9 ILD (INTERSTITIAL LUNG DISEASE): ICD-10-CM

## 2023-01-17 DIAGNOSIS — F17.201 TOBACCO ABUSE, IN REMISSION: ICD-10-CM

## 2023-01-17 PROCEDURE — 95811 POLYSOM 6/>YRS CPAP 4/> PARM: CPT

## 2023-01-17 PROCEDURE — 95811 POLYSOM 6/>YRS CPAP 4/> PARM: CPT | Performed by: INTERNAL MEDICINE

## 2023-01-30 DIAGNOSIS — G47.33 OSA (OBSTRUCTIVE SLEEP APNEA): Primary | ICD-10-CM

## 2023-02-02 ENCOUNTER — OFFICE VISIT (OUTPATIENT)
Dept: CARDIOLOGY | Facility: CLINIC | Age: 58
End: 2023-02-02
Payer: COMMERCIAL

## 2023-02-02 ENCOUNTER — OFFICE VISIT (OUTPATIENT)
Dept: PULMONOLOGY | Facility: CLINIC | Age: 58
End: 2023-02-02
Payer: COMMERCIAL

## 2023-02-02 VITALS
OXYGEN SATURATION: 94 % | HEIGHT: 68 IN | TEMPERATURE: 98.6 F | RESPIRATION RATE: 18 BRPM | SYSTOLIC BLOOD PRESSURE: 137 MMHG | WEIGHT: 186.2 LBS | BODY MASS INDEX: 28.22 KG/M2 | HEART RATE: 104 BPM | DIASTOLIC BLOOD PRESSURE: 81 MMHG

## 2023-02-02 VITALS
BODY MASS INDEX: 36.42 KG/M2 | SYSTOLIC BLOOD PRESSURE: 156 MMHG | HEART RATE: 91 BPM | WEIGHT: 185.5 LBS | HEIGHT: 60 IN | DIASTOLIC BLOOD PRESSURE: 89 MMHG

## 2023-02-02 DIAGNOSIS — I27.21 PULMONARY ARTERIAL HYPERTENSION: ICD-10-CM

## 2023-02-02 DIAGNOSIS — J84.9 ILD (INTERSTITIAL LUNG DISEASE): ICD-10-CM

## 2023-02-02 DIAGNOSIS — M33.29: ICD-10-CM

## 2023-02-02 DIAGNOSIS — I27.20 PULMONARY HYPERTENSION: ICD-10-CM

## 2023-02-02 DIAGNOSIS — I48.92 PAROXYSMAL ATRIAL FLUTTER: Primary | ICD-10-CM

## 2023-02-02 DIAGNOSIS — F51.04 CHRONIC INSOMNIA: ICD-10-CM

## 2023-02-02 DIAGNOSIS — I50.810 RIGHT HEART FAILURE WITH REDUCED RIGHT VENTRICULAR FUNCTION: ICD-10-CM

## 2023-02-02 DIAGNOSIS — J96.12 CHRONIC RESPIRATORY FAILURE WITH HYPERCAPNIA: ICD-10-CM

## 2023-02-02 DIAGNOSIS — G47.33 OSA (OBSTRUCTIVE SLEEP APNEA): ICD-10-CM

## 2023-02-02 DIAGNOSIS — I07.1 TRICUSPID VALVE INSUFFICIENCY, UNSPECIFIED ETIOLOGY: ICD-10-CM

## 2023-02-02 DIAGNOSIS — I51.9 RIGHT VENTRICULAR DYSFUNCTION: Primary | ICD-10-CM

## 2023-02-02 DIAGNOSIS — I27.81 CHRONIC COR PULMONALE: ICD-10-CM

## 2023-02-02 DIAGNOSIS — Z86.711 HISTORY OF PULMONARY EMBOLUS (PE): ICD-10-CM

## 2023-02-02 PROCEDURE — 99214 OFFICE O/P EST MOD 30 MIN: CPT | Performed by: INTERNAL MEDICINE

## 2023-02-02 PROCEDURE — 99214 OFFICE O/P EST MOD 30 MIN: CPT | Performed by: FAMILY MEDICINE

## 2023-02-02 RX ORDER — SPIRONOLACTONE 50 MG/1
25 TABLET, FILM COATED ORAL EVERY 12 HOURS SCHEDULED
COMMUNITY
Start: 2023-01-24 | End: 2023-02-02 | Stop reason: SDUPTHER

## 2023-02-02 NOTE — ASSESSMENT & PLAN NOTE
Status post RFA with Dr. Schrader in December.   No further episodes of atrial flutter.  Continue chronic anticoagulation with Eliquis for now.  No bleeding issues noted.  He has follow-up in March with Dr. Schrader.

## 2023-02-02 NOTE — PROGRESS NOTES
Chief Complaint  paroxysmal atrial flutter and Follow-up (Discuss echo results 12/29/2022,ekg 11/15/2022)    Subjective        History of Present Illness  Braxton Mcghee presents to [facility] CARDIOLOGY   Mr. Mcghee is a 57-year-old male patient coming in today for routine cardiac follow-up.  Medical history significant for atrial flutter, polymyositis  with connective tissue disorder, and pulmonary hypertension.  He was initially diagnosed with atrial flutter in 2020, and had cardioversion with subsequent return to sinus rhythm.  He had recurrence in August 2022, and had cardioversion again.  He was referred to Dr. Schrader, and in December he underwent ablation for his atrial flutter.  He reports since this time he has not had any further episodes of atrial flutter, denies any sensation of palpitations, racing heart rate.  He is continuing on chronic anticoagulation, and denies any bleeding manifestations.  Blood pressure slightly elevated in the office today at 156/89, however he was seen in pulmonologist office earlier this morning, with normal BP, and reports he generally has blood pressure in the 130 range.   Cardiac wise he is doing well, denies any concerns at today's visit.  In addition to cardiology he closely follows with [facility] pulmonary hypertension clinic, pulmonology, and rheumatology due to his underlying connective tissue disorder.  No complaints of chest pains, or palpitations.  Over the last several months, he has had significant improvement in how he is doing , he is no longer wearing oxygen, and he has been increasing his activity and tolerating it well.  He does have some exertional dyspnea, but is active, and generally walking more than a mile a day on his treadmill.        PMH  Past Medical History:   Diagnosis Date   • Atrial flutter (HCC)    • CHF (congestive heart failure) (HCC)    • Hypertension    • Interstitial lung disease (HCC)    • Polymyositis associated with  "connective tissue disorder (HCC)    • Pulmonary embolism (HCC)    • Pulmonary hypertension (HCC)    • Sleep apnea, obstructive          ALLERGY  Allergies   Allergen Reactions   • Beta Adrenergic Blockers Nausea Only and Other (See Comments)     \"Made really sick/couldn't eat\"  Lightheaded, sweaty, nauseous, can not focus on anything           SURGICALHX  Past Surgical History:   Procedure Laterality Date   • CARDIAC CATHETERIZATION     • CARDIAC ELECTROPHYSIOLOGY PROCEDURE N/A 2022    Procedure: EP/ABLATION - A FLUTTER;  Surgeon: Jacky Schrader MD;  Location: [facility] EP INVASIVE LOCATION;  Service: Cardiology;  Laterality: N/A;   • OTHER SURGICAL HISTORY     • OTHER SURGICAL HISTORY      EP STUDY WITH ABLATION 2022 PER DR. SCHRADER          SOC  Social History     Socioeconomic History   • Marital status:    Tobacco Use   • Smoking status: Former     Packs/day: 1.00     Years: 30.00     Pack years: 30.00     Types: Cigarettes     Start date: 1981     Quit date: 11/15/2015     Years since quittin.2   • Smokeless tobacco: Never   Vaping Use   • Vaping Use: Never used   Substance and Sexual Activity   • Alcohol use: Yes     Comment: occassionally   • Drug use: Never   • Sexual activity: Defer         FAMHX  Family History   Problem Relation Age of Onset   • Heart failure Mother    • Lupus Mother    • Stroke Mother    • Cancer Father    • Diabetes Brother           MEDSIGONLY  Current Outpatient Medications on File Prior to Visit   Medication Sig   • apixaban (Eliquis) 5 MG tablet tablet Take 1 tablet by mouth 2 (Two) Times a Day. First dose tomorrow 2022   • bumetanide (BUMEX) 1 MG tablet Take 1 mg by mouth Daily.   • Euthyrox 25 MCG tablet Take 1 tablet by mouth Daily.   • HYDROcodone-acetaminophen (NORCO)  MG per tablet TAKE 1/2 TO 1 (ONE-HALF TO ONE) TABLET BY MOUTH EVERY 6 HOURS AS NEEDED FOR PAIN   • hydroxychloroquine (PLAQUENIL) 200 MG tablet Take 1 tablet by mouth " "Daily.   • metOLazone (ZAROXOLYN) 5 MG tablet Take 1 tablet by mouth 2 (Two) Times a Day. (Patient taking differently: Take 5 mg by mouth Daily.)   • omeprazole (priLOSEC) 20 MG capsule Take 20 mg by mouth Daily.   • Opsumit 10 MG tablet Take 10 mg by mouth Daily.   • predniSONE (DELTASONE) 5 MG tablet Take 5 mg by mouth Daily.   • spironolactone (ALDACTONE) 25 MG tablet Take 1 tablet by mouth Daily.   • tadalafil (ADCIRCA) 20 MG tablet tablet Take 1 tablet by mouth 2 (Two) Times a Day.   • zolpidem (AMBIEN) 10 MG tablet Take 1 tablet by mouth At Night As Needed for Sleep.     No current facility-administered medications on file prior to visit.       REVIEW OF SYSTEMS  Review of Systems   Constitutional: Negative for activity change and fatigue.   Respiratory: Positive for shortness of breath (On exertion). Negative for cough and chest tightness.    Cardiovascular: Negative for chest pain, palpitations and leg swelling.   Gastrointestinal: Negative for nausea and vomiting.   Neurological: Negative for dizziness, syncope and light-headedness.   Hematological: Does not bruise/bleed easily.        Objective   Vitals:    02/02/23 1009   BP: 156/89   Pulse: 91   Weight: 84.1 kg (185 lb 8 oz)   Height: 91 cm (35.83\")         Physical Exam  Constitutional:       General: He is awake. He is not in acute distress.     Appearance: Normal appearance.   Eyes:      General: No scleral icterus.     Conjunctiva/sclera: Conjunctivae normal.   Neck:      Vascular: No carotid bruit or JVD.   Cardiovascular:      Rate and Rhythm: Normal rate and regular rhythm.      Heart sounds: Normal heart sounds, S1 normal and S2 normal. No murmur heard.    No friction rub. No gallop.   Pulmonary:      Effort: Pulmonary effort is normal. Prolonged expiration present.      Breath sounds: No wheezing, rhonchi or rales.      Comments: Breath sounds slightly diminished but clear  Abdominal:      General: There is no distension.      Palpations: " Abdomen is soft.   Musculoskeletal:         General: Normal range of motion.      Right lower leg: No edema.      Left lower leg: No edema.   Skin:     General: Skin is warm and dry.      Comments: Chronic discoloration of lower extremities   Neurological:      Mental Status: He is alert and oriented to person, place, and time.         Result Review     The following data was reviewed by SATISH Watson on 02/02/23.      CMP    CMP 9/20/22 12/28/22 12/29/22   Glucose  108 (A)    Glucose 72  99   BUN 12 25 (A) 14   Creatinine 0.73 0.79 0.77   eGFR  102.3    Sodium 124 (A) 133 (A) 135 (A)   Potassium 4.2 4.3 4.5   Chloride 95 (A) 97 (A) 100   Calcium 8.6 9.1 9.6   BUN/Creatinine Ratio  31.6 (A)    Anion Gap 6 8.0 7   (A) Abnormal value       Comments are available for some flowsheets but are not being displayed.           CBC w/diff    CBC w/Diff 12/28/22   WBC 9.32   RBC 3.65 (A)   Hemoglobin 13.2   Hematocrit 36.1 (A)   MCV 98.9 (A)   MCH 36.2 (A)   MCHC 36.6 (A)   RDW 14.6   Platelets 271   (A) Abnormal value             Lab Results   Component Value Date    TSH 2.894 09/14/2022      Lab Results   Component Value Date    FREET4 0.87 04/05/2022        Magnesium   Date Value Ref Range Status   11/15/2022 1.8 1.6 - 2.6 mg/dL Final      Echocardiogram   at [place]   December 29, 2022    Low normal LV systolic function; LVEF 50-55%.   Mild to moderately dilated RV with mildly reduced systolic function mild   aortic and tricuspid regurgitation   RV systolic pressure estimated at 70 mmHg   Mild TR.  Est PASP is 45 mmHg.   Compared to the prior study 11/15/2022, RV function has declined and RV   systolic pressure is higher.          Assessment and Plan   Diagnoses and all orders for this visit:    1. Paroxysmal atrial flutter (HCC) (Primary)  Assessment & Plan:  Status post RFA with Dr. Schrader in December.   No further episodes of atrial flutter.  Continue chronic anticoagulation with Eliquis for now.  No  bleeding issues noted.  He has follow-up in March with Dr. Schrader.    Orders:  -     apixaban (Eliquis) 5 MG tablet tablet; Take 1 tablet by mouth 2 (Two) Times a Day. First dose tomorrow 12/29/2022  Dispense: 180 tablet; Refill: 3    2. Pulmonary arterial hypertension (HCC)  -     apixaban (Eliquis) 5 MG tablet tablet; Take 1 tablet by mouth 2 (Two) Times a Day. First dose tomorrow 12/29/2022  Dispense: 180 tablet; Refill: 3    3. Right heart failure with reduced right ventricular function (HCC)  Assessment & Plan:  Clinically he is stable.  Echocardiogram in December with mild to moderate dilation of the right ventricle.  He follows closely with [facility] pulmonary hypertension clinic, and has routine echocardiogram monitoring through their services.      4. Tricuspid valve insufficiency, unspecified etiology    5. Chronic cor pulmonale (HCC)  Assessment & Plan:  He is stable, he has had significant improvement in his symptoms over the last several months.  He follows with the pulmonary hypertension clinic at [place], along with Dr. Geronimo.  Pulmonary hypertension, interstitial lung disease, most likely secondary to his polymyositis.   Clinically he has no signs of decompensated heart failure, and should continue his current diuretic regiment with Bumex, metolazone, and spironolactone.  Sodium levels have improved since decreased dosing of spironolactone.                Follow Up   Return in about 9 months (around 11/2/2023) for with new MD d/t Dr Elian dexter.    Patient was given instructions and counseling regarding his condition or for health maintenance advice. Please see specific information pulled into the AVS if appropriate.     Etta Johansen, APRN  02/02/23  10:29 EST    Dictated Utilizing Dragon Dictation

## 2023-02-02 NOTE — ASSESSMENT & PLAN NOTE
Clinically he is stable.  Echocardiogram in December with mild to moderate dilation of the right ventricle.  He follows closely with [facility] pulmonary hypertension clinic, and has routine echocardiogram monitoring through their services.

## 2023-02-02 NOTE — PROGRESS NOTES
Primary Care Provider  Jose Adorno MD     Referring Provider  No ref. provider found     Chief Complaint  ILD, Sleep Apnea, Cough, and Follow-up (2-3 month follow up)    Subjective          Braxton Mcghee presents to [facility] PULMONARY & CRITICAL CARE MEDICINE  Sleep Apnea    Shortness of Breath      Braxton Mcghee is a 57 y.o. male patient with severe primary pulmonary hypertension, polymyositis, interstitial lung disease, obstructive sleep apnea, obesity hypoventilation syndrome, chronic congestive heart failure with low ejection fraction and chronic respiratory failure, atrial flutter.  He is here for follow-up.  Since his last office visit, he has had follow-up with pulmonary hypertension clinic at [facility] [place] and remains stable on therapy with tadalafil and macitentan.  He remains on Bumex once daily.  Weight has been stable at 186 pounds.  He received new CPAP machine through [facility].  We had repeated sleep study which showed the need of CPAP at 12 cm of water on CPAP titration.  He did the sleep study on nasal pillow. He is currently using tadalafil 40 mg once daily and Opsumit 10 mg once daily.  He is currently on Bumex daily and is on metolazone as needed.  He also had cardiac ablation done in [facility] in December 2022.  He walks on treadmill more than a mile 2-3 times a week.  He is also on Eliquis 5 mg twice daily.  He has been on prednisone at 5 mg once daily and hydroxychloroquine for his polymyositis.  His breathing is better.  He is able to move around more and is active.  He goes up and down the stairs 8-9 times a day without significant issues. He still has to pace himself with activities.  He feels like his cough has significantly improved.  No nausea or vomiting.  No flushing or headache.  No significant chest pain or chest tightness.  Complains of some cough when he lays down at times.  He goes to bed around 6 to 7 at  night, gets out of bed around 4 in the morning.  He has to go to bed at times, does use Ambien for sleep intermittently.  No morning headaches.  No significant sleepiness during daytime.    Review of Systems   Respiratory: Positive for shortness of breath.    General:  Fatigue, No Fever No weight loss or loss of appetite  HEENT: No dysphagia, No Visual Changes, no rhinorrhea  Respiratory: Improving cough,+Dyspnea, minimal phlegm, No Pleuritic Pain, no wheezing, no hemoptysis.  Cardiovascular: Denies chest pain, denies palpitations,+FLORENTINO, No Chest Pressure  Gastrointestinal:  No Abdominal Pain, No Nausea, No Vomiting, No Diarrhea  Genitourinary:  No Dysuria, No Frequency, No Hesitancy  Musculoskeletal: No muscle pain, has bilateral leg swelling  Endocrine:  No Heat Intolerance, No Cold Intolerance, No Fatigue  Hematologic:  No Bleeding, No Bruising  Psychiatric:  No Anxiety, No Depression  Neurologic:  No Confusion, no Dysarthria, No Headaches  Skin:  No Rash, No Open Wounds, trace lower extremity edema    Family History   Problem Relation Age of Onset   • Heart failure Mother    • Lupus Mother    • Stroke Mother    • Cancer Father    • Diabetes Brother         Social History     Socioeconomic History   • Marital status:    Tobacco Use   • Smoking status: Former     Packs/day: 1.00     Years: 30.00     Pack years: 30.00     Types: Cigarettes     Start date: 1981     Quit date: 11/15/2015     Years since quittin.2   • Smokeless tobacco: Never   Vaping Use   • Vaping Use: Never used   Substance and Sexual Activity   • Alcohol use: Yes     Comment: occassionally   • Drug use: Never   • Sexual activity: Defer        Past Medical History:   Diagnosis Date   • Abnormal ECG    • Arrhythmia    • Atrial flutter (HCC)    • CHF (congestive heart failure) (HCC)    • Hypertension    • Myocardial infarction (HCC)    • Pulmonary embolism (HCC)    • Sleep apnea, obstructive         Immunization History   Administered  "Date(s) Administered   • COVID-19 ([employer]) 04/07/2021   • Flu Vaccine Intradermal Quad 18-64YR 10/01/2022   • Flu Vaccine Quad PF >36MO 09/20/2018, 10/18/2019, 09/14/2020   • FluLaval/Fluzone >6mos 10/26/2021, 10/26/2021, 09/20/2022, 09/20/2022   • Influenza Split Preservative Free ID 10/02/2020, 10/26/2021, 09/20/2022   • Tdap 10/22/2021         Allergies   Allergen Reactions   • Beta Adrenergic Blockers Nausea Only and Other (See Comments)     \"Made really sick/couldn't eat\"  Lightheaded, sweaty, nauseous, can not focus on anything           Current Outpatient Medications:   •  apixaban (Eliquis) 5 MG tablet tablet, Take 1 tablet by mouth 2 (Two) Times a Day. First dose tomorrow 12/29/2022, Disp: 180 tablet, Rfl: 3  •  bumetanide (BUMEX) 1 MG tablet, Take 1 mg by mouth Daily., Disp: , Rfl:   •  Euthyrox 25 MCG tablet, Take 1 tablet by mouth Daily., Disp: , Rfl:   •  HYDROcodone-acetaminophen (NORCO)  MG per tablet, TAKE 1/2 TO 1 (ONE-HALF TO ONE) TABLET BY MOUTH EVERY 6 HOURS AS NEEDED FOR PAIN, Disp: , Rfl:   •  hydroxychloroquine (PLAQUENIL) 200 MG tablet, Take 1 tablet by mouth Daily., Disp: , Rfl:   •  metOLazone (ZAROXOLYN) 5 MG tablet, Take 1 tablet by mouth 2 (Two) Times a Day. (Patient taking differently: Take 5 mg by mouth Daily.), Disp: 180 tablet, Rfl: 3  •  omeprazole (priLOSEC) 20 MG capsule, Take 20 mg by mouth Daily., Disp: , Rfl:   •  Opsumit 10 MG tablet, Take 10 mg by mouth Daily., Disp: , Rfl:   •  predniSONE (DELTASONE) 5 MG tablet, Take 5 mg by mouth Daily., Disp: , Rfl:   •  spironolactone (ALDACTONE) 25 MG tablet, Take 1 tablet by mouth Daily., Disp: , Rfl:   •  tadalafil (ADCIRCA) 20 MG tablet tablet, Take 1 tablet by mouth 2 (Two) Times a Day., Disp: , Rfl:   •  zolpidem (AMBIEN) 10 MG tablet, Take 1 tablet by mouth At Night As Needed for Sleep., Disp: 30 tablet, Rfl: 2     Objective   Vital Signs:   /81 (BP Location: Left arm, Patient Position: Sitting, Cuff Size: Adult)   " "Pulse 104   Temp 98.6 °F (37 °C) (Tympanic)   Resp 18   Ht 172.7 cm (68\")   Wt 84.5 kg (186 lb 3.2 oz)   SpO2 94% Comment: room air/ Oxygen 2 liters PRN  BMI 28.31 kg/m²     Mallampatti classification : 1  Physical Exam  Vital Signs Reviewed  Pleasant male, in no acute distress, Normal conversational  HEENT:  PERRL, EOMI.  OP, nares clear, no sinus tenderness  Neck:  Supple, no JVD, no thyromegaly  Lymph: no axillary, cervical, supraclavicular lymphadenopathy noted bilaterally  Chest:  good aeration, bilateral diminished breath sounds, no wheezing, no rhonchi, minimal crackles at bases resonant to percussion b/l  CV: RRR, no MGR, pulses 2+, equal.  Abd:  Soft, NT, ND, + BS, no HSM  EXT:  no clubbing, no cyanosis, trace BLE edema, left lower extremity, on lateral side has open wound, slowly healing  Neuro:  A&Ox3, CN grossly intact, no focal deficits  Skin: No rashes or lesions noted     Result Review :   The following data was reviewed by: Louis Geronimo MD on 12/16/2021:  Common labs    Common Labs 9/20/22 12/28/22 12/28/22 12/28/22 12/29/22     1224 1224 1224    Glucose   108 (A)     Glucose 72    99   BUN 12  25 (A)  14   Creatinine 0.73  0.79  0.77   Sodium 124 (A)  133 (A)  135 (A)   Potassium 4.2  4.3  4.5   Chloride 95 (A)  97 (A)  100   Calcium 8.6  9.1  9.6   WBC  9.32      Hemoglobin  13.2      Hematocrit  36.1 (A)      Platelets  271      Hemoglobin A1C    4.60 (A)    (A) Abnormal value       Comments are available for some flowsheets but are not being displayed.           CMP    CMP 9/20/22 12/28/22 12/29/22   Glucose  108 (A)    Glucose 72  99   BUN 12 25 (A) 14   Creatinine 0.73 0.79 0.77   eGFR  102.3    Sodium 124 (A) 133 (A) 135 (A)   Potassium 4.2 4.3 4.5   Chloride 95 (A) 97 (A) 100   Calcium 8.6 9.1 9.6   BUN/Creatinine Ratio  31.6 (A)    Anion Gap 6 8.0 7   (A) Abnormal value       Comments are available for some flowsheets but are not being displayed.           CBC    CBC 12/28/22   WBC " 9.32   RBC 3.65 (A)   Hemoglobin 13.2   Hematocrit 36.1 (A)   MCV 98.9 (A)   MCH 36.2 (A)   MCHC 36.6 (A)   RDW 14.6   Platelets 271   (A) Abnormal value              Data reviewed: Radiologic studies Previous imaging from outside hospital was reviewed.  Outside office note from pulmonary hypertension clinic reviewed.            Assessment and Plan    Diagnoses and all orders for this visit:    1. Right ventricular dysfunction (Primary)  -     Overnight Sleep Oximetry Study; Future    2. History of pulmonary embolus (PE)  -     Overnight Sleep Oximetry Study; Future    3. Pulmonary hypertension (HCC)  -     Overnight Sleep Oximetry Study; Future    4. Chronic respiratory failure with hypercapnia (HCC)  -     Overnight Sleep Oximetry Study; Future    5. Pulmonary arterial hypertension (HCC)  -     Overnight Sleep Oximetry Study; Future    6. DASIA (obstructive sleep apnea)  -     Overnight Sleep Oximetry Study; Future    7. Chronic insomnia  -     Overnight Sleep Oximetry Study; Future    8. ILD (interstitial lung disease) ([facility])    9. Polymyositis with other organ involvement ([facility])  -     Overnight Sleep Oximetry Study; Future      Pulmonary hypertension: Per pulmonary hypertension clinic at [facility].  Continue Opsumit 10 mg once daily and tadalafil 40 mg once daily.  His repeat echocardiogram shows continued significant improvement in pulmonary artery pressure and right heart function.    Maintain his dry weight at 185 pounds or less.  Continue with diuretics including metolazone as needed and regular Bumex.    Polymyositis and rheumatoid issues per Dr. Clayton.  Currently on hydroxychloroquine and prednisone.    Obstructive sleep apnea: Repeat sleep study was reviewed with him today.  It showed obstructive sleep apnea, CPAP titration shows CPAP pressure need of 12 cm of water.  I advised him to use CPAP at 12 cm of water with nasal pillow.  Sleep hygiene was discussed.  Weight loss counseling was done.  He  has lost more than 60 pounds over the last year.  Check overnight oximetry with CPAP on on room air.  He was previously on oxygen bleed in with CPAP machine.     Off oxygen with sleep and activities for now.  Advised him to keep himself active.  He is able to move around and walk more than a mile and a treadmill without need of oxygen now.    Continue DuoNeb as needed.  Continue with Eliquis.    Follow Up   Return in about 4 months (around 6/2/2023).  Patient was given instructions and counseling regarding his condition or for health maintenance advice. Please see specific information pulled into the AVS if appropriate.       Electronically signed by Louis Geronimo MD, 2/2/2023, 08:30 EST.

## 2023-02-02 NOTE — ASSESSMENT & PLAN NOTE
He is stable, he has had significant improvement in his symptoms over the last several months.  He follows with the pulmonary hypertension clinic at [place], along with Dr. Geronimo.  Pulmonary hypertension, interstitial lung disease, most likely secondary to his polymyositis.   Clinically he has no signs of decompensated heart failure, and should continue his current diuretic regiment with Bumex, metolazone, and spironolactone.  Sodium levels have improved since decreased dosing of spironolactone.

## 2023-03-09 ENCOUNTER — TRANSCRIBE ORDERS (OUTPATIENT)
Dept: ADMINISTRATIVE | Facility: HOSPITAL | Age: 58
End: 2023-03-09
Payer: COMMERCIAL

## 2023-03-09 DIAGNOSIS — Z79.52 LONG TERM CURRENT USE OF SYSTEMIC STEROIDS: Primary | ICD-10-CM

## 2023-03-14 ENCOUNTER — OFFICE VISIT (OUTPATIENT)
Dept: CARDIOLOGY | Facility: CLINIC | Age: 58
End: 2023-03-14
Payer: COMMERCIAL

## 2023-03-14 VITALS
WEIGHT: 187 LBS | OXYGEN SATURATION: 98 % | DIASTOLIC BLOOD PRESSURE: 84 MMHG | SYSTOLIC BLOOD PRESSURE: 124 MMHG | HEIGHT: 68 IN | BODY MASS INDEX: 28.34 KG/M2 | HEART RATE: 87 BPM

## 2023-03-14 DIAGNOSIS — Z86.711 HISTORY OF PULMONARY EMBOLUS (PE): ICD-10-CM

## 2023-03-14 DIAGNOSIS — I48.92 PAROXYSMAL ATRIAL FLUTTER: Primary | ICD-10-CM

## 2023-03-14 DIAGNOSIS — I27.20 PULMONARY HYPERTENSION: ICD-10-CM

## 2023-03-14 PROCEDURE — 99213 OFFICE O/P EST LOW 20 MIN: CPT | Performed by: INTERNAL MEDICINE

## 2023-03-14 PROCEDURE — 93000 ELECTROCARDIOGRAM COMPLETE: CPT | Performed by: INTERNAL MEDICINE

## 2023-03-14 NOTE — PROGRESS NOTES
Braxton Mcghee Jr.  1965  [phone]    03/14/2023    [facility] CARDIOLOGY [place]     Referring Provider: No ref. provider found     Jose Adorno MD  [address] [id number]    Chief Complaint   Patient presents with   • Rapid Heart Rate       Problem List:     1. Typical Atrial flutter  a. CHADSvasc 1 = Eliquis   b. External cardioversion July 2020-ANKUR 7/13/2020: EF 35 to 40% with moderate global hypokinesis, moderate to severely dilated RV with moderate to severely reduced right ventricular systolic function, no evidence of left atrial appendage thrombus, severely dilated right atrium, mild MR, moderate to severe TR  c. External cardioversion 8/12/2022 from right atrial flutter to normal sinus rhythm  d. Radiofrequency catheter ablation of right atrial isthmus dependent flutter on 12/28/2022  e. Echocardiogram with LVEF 50 to 55%, mildly reduced RV systolic function with increased right ventricular size, mild TR, RV systolic pressure 70 mmHg, right atrial large  2. Pulmonary hypertension  a. Followed at [place] pulmonary hypertension clinic, treated with Veletri  b. Echocardiogram 3/4/2020: Technically limited study, adequate left ventricular systolic function with wall motion abnormalities and underlying left ventricular hypertrophy, trace AR, trace MR, mild TR, severe pulmonary hypertension, enlarged right atrium and right ventricle  c. ANKUR 7/13/2020: EF 35 to 40% with moderate global hypokinesis, moderate to severely dilated RV with moderate to severely reduced right ventricular systolic function, no evidence of left atrial appendage thrombus, severely dilated right atrium, mild MR, moderate to severe TR  d. Echocardiogram 4/5/2022: LVEF 55 to 60%, severely dilated right ventricle with moderate to severely depressed systolic function and pressure overload pattern of interventricular septum, severe right atrial enlargement, severe TR with hepatic vein flow  "reversal, RVSP 92 mmHg, no evidence of interatrial shunt  e. Echocardiogram 9/13/2022: Normal LV size and function EF 55 to 60%, severely dilated RV with moderate to severely depressed systolic function and pressure overload pattern of interventricular septum, severe right atrial enlargement, severe TR, RVSP 48 mmHg  f. [facility] 9/2022 deficient data [place]   3. Hypertension  4. History of PE  5. Obstructive sleep apnea  6. Previous tobacco abuse  7. Surgical history  a. None  Allergies  Allergies   Allergen Reactions   • Beta Adrenergic Blockers Nausea Only and Other (See Comments)     \"Made really sick/couldn't eat\"  Lightheaded, sweaty, nauseous, can not focus on anything        Current Medications    Current Outpatient Medications:   •  apixaban (Eliquis) 5 MG tablet tablet, Take 1 tablet by mouth 2 (Two) Times a Day. First dose tomorrow 12/29/2022, Disp: 180 tablet, Rfl: 3  •  bumetanide (BUMEX) 1 MG tablet, Take 1 tablet by mouth Daily., Disp: , Rfl:   •  Euthyrox 25 MCG tablet, Take 1 tablet by mouth Daily., Disp: , Rfl:   •  HYDROcodone-acetaminophen (NORCO)  MG per tablet, TAKE 1/2 TO 1 (ONE-HALF TO ONE) TABLET BY MOUTH EVERY 6 HOURS AS NEEDED FOR PAIN, Disp: , Rfl:   •  hydroxychloroquine (PLAQUENIL) 200 MG tablet, Take 1 tablet by mouth Daily., Disp: , Rfl:   •  metOLazone (ZAROXOLYN) 5 MG tablet, Take 1 tablet by mouth 2 (Two) Times a Day. (Patient taking differently: Take 1 tablet by mouth Daily.), Disp: 180 tablet, Rfl: 3  •  omeprazole (priLOSEC) 20 MG capsule, Take 1 capsule by mouth Daily., Disp: , Rfl:   •  Opsumit 10 MG tablet, Take 10 mg by mouth Daily., Disp: , Rfl:   •  predniSONE (DELTASONE) 5 MG tablet, Take 1 tablet by mouth Daily., Disp: , Rfl:   •  spironolactone (ALDACTONE) 25 MG tablet, Take 1 tablet by mouth Daily., Disp: , Rfl:   •  tadalafil (ADCIRCA) 20 MG tablet tablet, Take 1 tablet by mouth 2 (Two) Times a Day., Disp: , Rfl:   •  zolpidem (AMBIEN) 10 MG tablet, Take 1 tablet " "by mouth At Night As Needed for Sleep., Disp: 30 tablet, Rfl: 2    History of Present Illness     Pt presents for follow up of AFL. Since we last saw the pt, pt denies any tachypalpitations, SOB, CP, LH, and dizziness. Denies any hospitalizations, ER visits, bleeding, or TIA/CVA symptoms. Overall feels well.  Blood pressures been stable at home.  Feels improved overall since his catheter ablation procedure.  More energy.  Less shortness of breath.    ROS:  General:  Denies fatigue, weight gain or loss  Cardiovascular:  Denies CP, PND, syncope, near syncope, edema or palpitations.  Pulmonary:  Denies FLORENTINO, cough, or wheezing      Vitals:    03/14/23 1232   BP: 124/84   BP Location: Right arm   Patient Position: Sitting   Pulse: 87   SpO2: 98%   Weight: 84.8 kg (187 lb)   Height: 172.7 cm (68\")     Body mass index is 28.43 kg/m².  PE:  General: NAD  Neck: no JVD, no carotid bruits, no TM  Heart RRR, NL S1, S2, S4 present, no rubs, murmurs  Lungs: CTA, no wheezes, rhonchi, or rales  Abd: soft, non-tender, NL BS  Ext: No musculoskeletal deformities, no edema, cyanosis, or clubbing  Psych: normal mood and affect    Diagnostic Data:        ECG 12 Lead    Date/Time: 3/14/2023 1:11 PM  Performed by: Jacky Schrader MD  Authorized by: Jacky Schrader MD   Comparison: compared with previous ECG from 11/8/2022  Similar to previous ECG  Rhythm: sinus rhythm  BPM: 86  Other findings: left ventricular hypertrophy                   1. Paroxysmal atrial flutter (HCC)    2. Pulmonary hypertension (HCC)    3. History of pulmonary embolus (PE)          Plan:  1. Atrial flutter  -CHADSvasc: 1 - on Eliquis   Status post catheter ablation with no recurrence overall clinically improved.  Did explain to the patient he does have approximately 15% chance of developing atrial fibrillation in the future.  He is to notify us if he does develop any further atrial arrhythmias.        2. Pulmonary hypertension  -Followed by [facility]   -Most " recent echocardiogram 9/13/2022: Normal LV size and function EF 55 to 60%, severely dilated RV with moderate to severely depressed systolic function and pressure overload pattern of interventricular septum, severe right atrial enlargement, severe TR, RVSP 48 mmHg    3.  History of pulmonary embolism: Apparently needs to take lifelong oral anticoagulation on Eliquis.      Follow-up with us as needed basis.

## 2023-03-21 ENCOUNTER — TELEPHONE (OUTPATIENT)
Dept: CARDIOLOGY | Facility: CLINIC | Age: 58
End: 2023-03-21
Payer: COMMERCIAL

## 2023-03-21 NOTE — TELEPHONE ENCOUNTER
Caller: Braxton Mcghee Jr.    Relationship to patient: Self    Best call back number: [phone]    Chief complaint: PT WANTS TO SEE DR. DAVIS INSTEAD OF SEEING DR. RAM    Type of visit: FU    Requested date:    If rescheduling, when is the original appointment: 11.2.23    Additional notes:

## 2023-04-08 DIAGNOSIS — F51.04 CHRONIC INSOMNIA: ICD-10-CM

## 2023-04-08 DIAGNOSIS — G47.33 OSA (OBSTRUCTIVE SLEEP APNEA): ICD-10-CM

## 2023-04-10 RX ORDER — ZOLPIDEM TARTRATE 10 MG/1
10 TABLET ORAL NIGHTLY PRN
Qty: 30 TABLET | Refills: 0 | Status: SHIPPED | OUTPATIENT
Start: 2023-04-10

## 2023-04-20 ENCOUNTER — HOSPITAL ENCOUNTER (OUTPATIENT)
Dept: BONE DENSITY | Facility: HOSPITAL | Age: 58
Discharge: HOME | End: 2023-04-20
Admitting: INTERNAL MEDICINE
Payer: COMMERCIAL

## 2023-04-20 DIAGNOSIS — Z79.52 LONG TERM CURRENT USE OF SYSTEMIC STEROIDS: ICD-10-CM

## 2023-04-20 PROCEDURE — 77080 DXA BONE DENSITY AXIAL: CPT

## 2023-04-25 ENCOUNTER — OFFICE VISIT (OUTPATIENT)
Dept: PULMONOLOGY | Facility: CLINIC | Age: 58
End: 2023-04-25
Payer: COMMERCIAL

## 2023-04-25 VITALS
HEIGHT: 68 IN | OXYGEN SATURATION: 96 % | TEMPERATURE: 98.7 F | BODY MASS INDEX: 29.72 KG/M2 | DIASTOLIC BLOOD PRESSURE: 77 MMHG | SYSTOLIC BLOOD PRESSURE: 149 MMHG | WEIGHT: 196.1 LBS | HEART RATE: 98 BPM | RESPIRATION RATE: 18 BRPM

## 2023-04-25 DIAGNOSIS — M33.29: ICD-10-CM

## 2023-04-25 DIAGNOSIS — I48.92 PAROXYSMAL ATRIAL FLUTTER: ICD-10-CM

## 2023-04-25 DIAGNOSIS — F51.04 CHRONIC INSOMNIA: ICD-10-CM

## 2023-04-25 DIAGNOSIS — I27.81 CHRONIC COR PULMONALE: ICD-10-CM

## 2023-04-25 DIAGNOSIS — J84.9 ILD (INTERSTITIAL LUNG DISEASE): ICD-10-CM

## 2023-04-25 DIAGNOSIS — G47.33 OSA (OBSTRUCTIVE SLEEP APNEA): Primary | ICD-10-CM

## 2023-04-25 DIAGNOSIS — J96.12 CHRONIC RESPIRATORY FAILURE WITH HYPERCAPNIA: ICD-10-CM

## 2023-04-25 DIAGNOSIS — I27.21 PULMONARY ARTERIAL HYPERTENSION: ICD-10-CM

## 2023-04-25 NOTE — PROGRESS NOTES
Primary Care Provider  Jose Adorno MD     Referring Provider  No ref. provider found     Chief Complaint  Follow-up (3 month f/up )    Subjective          Braxton Mcghee Jr. presents to [facility] PULMONARY & CRITICAL CARE MEDICINE  Sleep Apnea    Shortness of Breath      Braxton Mcghee Jr. is a 58 y.o. male patient with severe primary pulmonary hypertension, polymyositis, interstitial lung disease, obstructive sleep apnea, obesity hypoventilation syndrome, chronic congestive heart failure with low ejection fraction and chronic respiratory failure, atrial flutter.  He is here for follow-up.  Since his last office visit, he has remained stable on therapy with tadalafil and macitentan.  He remains on Bumex once daily.  He has gained about 10 pounds.  He was supposed to be on CPAP machine but has not been using it for the last month.  We did overnight oximetry on room air, showed significant desaturation to less than 88% for more than 3 hours. He is currently using tadalafil 40 mg once daily and Opsumit 10 mg once daily.  He is currently on Bumex daily and is on metolazone as needed.  He also had cardiac ablation done in [facility] in December 2022.  He walks on treadmill more than a mile 2-3 times a week.  He is also on Eliquis 5 mg twice daily.  He has been on prednisone at 5 mg once daily and hydroxychloroquine for his polymyositis.  Per rheumatology, he would need to be on prednisone regularly.  His breathing is better.  He is able to move around more and is active.  He goes up and down the stairs 8-9 times a day without significant issues. He still has to pace himself with activities.  He feels like his cough has significantly improved.  No nausea or vomiting.  No flushing or headache.  No significant chest pain or chest tightness.  Complains of some cough when he lays down at times.  He goes to bed around 6 to 7 at night, gets out of bed around 4 in the morning.   He has to go to bed at times, does use Ambien for sleep intermittently.  No morning headaches.  No significant sleepiness during daytime.    Review of Systems   Respiratory: Positive for shortness of breath.    General:  Fatigue, No Fever No weight loss or loss of appetite  HEENT: No dysphagia, No Visual Changes, no rhinorrhea  Respiratory: Improving cough,+Dyspnea, minimal phlegm, No Pleuritic Pain, no wheezing, no hemoptysis.  Cardiovascular: Denies chest pain, denies palpitations,+FLORENTINO, No Chest Pressure  Gastrointestinal:  No Abdominal Pain, No Nausea, No Vomiting, No Diarrhea  Genitourinary:  No Dysuria, No Frequency, No Hesitancy  Musculoskeletal: No muscle pain, has bilateral leg swelling  Endocrine:  No Heat Intolerance, No Cold Intolerance, No Fatigue  Hematologic:  No Bleeding, No Bruising  Psychiatric:  No Anxiety, No Depression  Neurologic:  No Confusion, no Dysarthria, No Headaches  Skin:  No Rash, No Open Wounds, trace lower extremity edema    Family History   Problem Relation Age of Onset   • Heart failure Mother    • Lupus Mother    • Stroke Mother    • Cancer Father    • Diabetes Brother         Social History     Socioeconomic History   • Marital status:    Tobacco Use   • Smoking status: Former     Packs/day: 1.00     Years: 30.00     Pack years: 30.00     Types: Cigarettes     Start date: 1981     Quit date: 11/15/2015     Years since quittin.4     Passive exposure: Past   • Smokeless tobacco: Never   Vaping Use   • Vaping Use: Never used   • Passive vaping exposure: Yes   Substance and Sexual Activity   • Alcohol use: Yes     Comment: occassionally   • Drug use: Never   • Sexual activity: Defer        Past Medical History:   Diagnosis Date   • Atrial flutter    • CHF (congestive heart failure)    • Hypertension    • Interstitial lung disease    • Polymyositis associated with connective tissue disorder    • Pulmonary embolism    • Pulmonary hypertension    • Sleep apnea,  "obstructive         Immunization History   Administered Date(s) Administered   • COVID-19 ([place]) 04/07/2021   • Flu Vaccine Intradermal Quad 18-64YR 10/01/2022   • Flu Vaccine Quad PF >36MO 09/20/2018, 10/18/2019, 09/14/2020   • FluLaval/Fluzone >6mos 09/20/2018, 10/18/2019, 09/14/2020, 10/26/2021, 10/26/2021, 09/20/2022, 09/20/2022   • Influenza Split Preservative Free ID 10/02/2020, 10/26/2021, 09/20/2022   • Tdap 10/22/2021         Allergies   Allergen Reactions   • Beta Adrenergic Blockers Nausea Only and Other (See Comments)     \"Made really sick/couldn't eat\"  Lightheaded, sweaty, nauseous, can not focus on anything           Current Outpatient Medications:   •  apixaban (Eliquis) 5 MG tablet tablet, Take 1 tablet by mouth 2 (Two) Times a Day. First dose tomorrow 12/29/2022, Disp: 180 tablet, Rfl: 3  •  bumetanide (BUMEX) 1 MG tablet, Take 1 tablet by mouth Daily., Disp: , Rfl:   •  Euthyrox 25 MCG tablet, Take 1 tablet by mouth Daily., Disp: , Rfl:   •  HYDROcodone-acetaminophen (NORCO)  MG per tablet, TAKE 1/2 TO 1 (ONE-HALF TO ONE) TABLET BY MOUTH EVERY 6 HOURS AS NEEDED FOR PAIN, Disp: , Rfl:   •  hydroxychloroquine (PLAQUENIL) 200 MG tablet, Take 1 tablet by mouth Daily., Disp: , Rfl:   •  metOLazone (ZAROXOLYN) 5 MG tablet, Take 1 tablet by mouth 2 (Two) Times a Day. (Patient taking differently: Take 1 tablet by mouth Daily.), Disp: 180 tablet, Rfl: 3  •  omeprazole (priLOSEC) 20 MG capsule, Take 1 capsule by mouth Daily., Disp: , Rfl:   •  Opsumit 10 MG tablet, Take 1 tablet by mouth Daily., Disp: , Rfl:   •  predniSONE (DELTASONE) 5 MG tablet, Take 1 tablet by mouth Daily., Disp: , Rfl:   •  spironolactone (ALDACTONE) 25 MG tablet, Take 1 tablet by mouth Daily., Disp: , Rfl:   •  tadalafil (ADCIRCA) 20 MG tablet tablet, Take 1 tablet by mouth 2 (Two) Times a Day., Disp: , Rfl:   •  zolpidem (AMBIEN) 10 MG tablet, TAKE 1 TABLET BY MOUTH AT NIGHT AS NEEDED FOR SLEEP, Disp: 30 tablet, Rfl: 0 " "    Objective   Vital Signs:   /77 (BP Location: Right arm, Patient Position: Sitting, Cuff Size: Large Adult)   Pulse 98   Temp 98.7 °F (37.1 °C) (Temporal)   Resp 18   Ht 172.7 cm (68\")   Wt 89 kg (196 lb 1.6 oz)   SpO2 96% Comment: room air; PRN 2.5 L's  BMI 29.82 kg/m²     Mallampatti classification : 1  Physical Exam  Vital Signs Reviewed  Pleasant male, in no acute distress, Normal conversational  HEENT:  PERRL, EOMI.  OP, nares clear, no sinus tenderness  Neck:  Supple, no JVD, no thyromegaly  Lymph: no axillary, cervical, supraclavicular lymphadenopathy noted bilaterally  Chest:  good aeration, bilateral diminished breath sounds, no wheezing, no rhonchi, minimal crackles at bases resonant to percussion b/l  CV: RRR, no MGR, pulses 2+, equal.  Abd:  Soft, NT, ND, + BS, no HSM  EXT:  no clubbing, no cyanosis, trace BLE edema, left lower extremity, on lateral side has open wound, slowly healing  Neuro:  A&Ox3, CN grossly intact, no focal deficits  Skin: No rashes or lesions noted     Result Review :   The following data was reviewed by: Louis Geronimo MD on 12/16/2021:  Common labs        9/20/2022    07:06 12/28/2022    12:24 12/29/2022    11:53   Common Labs   Glucose  108      Glucose 72       99        BUN 12      25   14        Creatinine 0.73      0.79   0.77        Sodium 124      133   135        Potassium 4.2      4.3   4.5        Chloride 95      97   100        Calcium 8.6      9.1   9.6        WBC  9.32      Hemoglobin  13.2      Hematocrit  36.1      Platelets  271      Hemoglobin A1C  4.60          This result is from an external source.     CMP        9/20/2022    07:06 12/28/2022    12:24 12/29/2022    11:53   CMP   Glucose  108      Glucose 72       99        BUN 12      25   14        Creatinine 0.73      0.79   0.77        EGFR  102.3      Sodium 124      133   135        Potassium 4.2      4.3   4.5        Chloride 95      97   100        Calcium 8.6      9.1   9.6      "   BUN/Creatinine Ratio  31.6      Anion Gap 6      8.0   7            This result is from an external source.     CBC        12/28/2022    12:24   CBC   WBC 9.32     RBC 3.65     Hemoglobin 13.2     Hematocrit 36.1     MCV 98.9     MCH 36.2     MCHC 36.6     RDW 14.6     Platelets 271         Data reviewed: Radiologic studies Previous imaging from outside hospital was reviewed.  Outside office note from pulmonary hypertension clinic reviewed.       Overnight oximetry showed significant desaturation to less than 88% for more than 3 and half hours.     Assessment and Plan    Diagnoses and all orders for this visit:    1. DASIA (obstructive sleep apnea) (Primary)    2. Chronic insomnia    3. Pulmonary arterial hypertension    4. Chronic respiratory failure with hypercapnia    5. ILD (interstitial lung disease)    6. Polymyositis with other organ involvement (HCC)    7. Chronic cor pulmonale    8. Paroxysmal atrial flutter      Pulmonary hypertension: Per pulmonary hypertension clinic at [facility].  Continue Opsumit 10 mg once daily and tadalafil 40 mg once daily.  His repeat echocardiogram shows continued significant improvement in pulmonary artery pressure and right heart function.    Maintain his dry weight at 185 pounds or less.  Continue with diuretics including metolazone as needed and regular Bumex.  Can drink water but + I cannot handle that it has to be dependent  His staff    Polymyositis and rheumatoid issues per Dr. Clayton.  Currently on hydroxychloroquine and prednisone.    Obstructive sleep apnea: Repeat sleep study was reviewed with him again today.  It showed obstructive sleep apnea, CPAP titration shows CPAP pressure need of 12 cm of water.  I advised him to use CPAP at 12 cm of water with nasal pillow.  Sleep hygiene was discussed.  Weight loss counseling was done.  He has lost more than 60 pounds over the last year.  Overnight oximetry showed significant desaturations.  He is currently not  using CPAP.  I advised him to use CPAP with oxygen.  If he is not able to, he will still need to use oxygen with sleep.  His apnea-hypopnea index on sleep study was 8.3.  Given his significant pulmonary hypertension, CPAP is imperative to use.    Off oxygen with sleep and activities for now.   Advised him to keep himself active.  He is able to move around and walk more than a mile and a treadmill without need of oxygen now.    Continue DuoNeb as needed.  Continue with Eliquis.    Follow Up   Return in about 6 months (around 10/25/2023).  Patient was given instructions and counseling regarding his condition or for health maintenance advice. Please see specific information pulled into the AVS if appropriate.       Electronically signed by Louis Geronimo MD, 4/25/2023, 14:12 EDT.

## 2023-05-08 DIAGNOSIS — G47.33 OSA (OBSTRUCTIVE SLEEP APNEA): ICD-10-CM

## 2023-05-08 DIAGNOSIS — F51.04 CHRONIC INSOMNIA: ICD-10-CM

## 2023-05-08 RX ORDER — ZOLPIDEM TARTRATE 10 MG/1
10 TABLET ORAL NIGHTLY PRN
Qty: 30 TABLET | Refills: 0 | Status: SHIPPED | OUTPATIENT
Start: 2023-05-08

## 2023-06-05 DIAGNOSIS — G47.33 OSA (OBSTRUCTIVE SLEEP APNEA): ICD-10-CM

## 2023-06-05 DIAGNOSIS — F51.04 CHRONIC INSOMNIA: ICD-10-CM

## 2023-06-06 RX ORDER — ZOLPIDEM TARTRATE 10 MG/1
10 TABLET ORAL NIGHTLY PRN
Qty: 30 TABLET | Refills: 0 | Status: SHIPPED | OUTPATIENT
Start: 2023-06-06

## 2023-08-11 DIAGNOSIS — F51.04 CHRONIC INSOMNIA: ICD-10-CM

## 2023-08-11 DIAGNOSIS — G47.33 OSA (OBSTRUCTIVE SLEEP APNEA): ICD-10-CM

## 2023-08-14 RX ORDER — ZOLPIDEM TARTRATE 10 MG/1
10 TABLET ORAL NIGHTLY PRN
Qty: 30 TABLET | Refills: 0 | Status: SHIPPED | OUTPATIENT
Start: 2023-08-14

## 2023-09-12 ENCOUNTER — OFFICE VISIT (OUTPATIENT)
Dept: PULMONOLOGY | Facility: CLINIC | Age: 58
End: 2023-09-12
Payer: COMMERCIAL

## 2023-09-12 VITALS
TEMPERATURE: 98.6 F | SYSTOLIC BLOOD PRESSURE: 141 MMHG | OXYGEN SATURATION: 97 % | HEIGHT: 68 IN | DIASTOLIC BLOOD PRESSURE: 80 MMHG | HEART RATE: 83 BPM | BODY MASS INDEX: 30.98 KG/M2 | RESPIRATION RATE: 18 BRPM | WEIGHT: 204.4 LBS

## 2023-09-12 DIAGNOSIS — J84.9 ILD (INTERSTITIAL LUNG DISEASE): ICD-10-CM

## 2023-09-12 DIAGNOSIS — G47.33 OSA (OBSTRUCTIVE SLEEP APNEA): ICD-10-CM

## 2023-09-12 DIAGNOSIS — F17.201 TOBACCO ABUSE, IN REMISSION: ICD-10-CM

## 2023-09-12 DIAGNOSIS — I27.21 PULMONARY ARTERIAL HYPERTENSION: ICD-10-CM

## 2023-09-12 DIAGNOSIS — I51.9 RIGHT VENTRICULAR DYSFUNCTION: ICD-10-CM

## 2023-09-12 DIAGNOSIS — Z23 FLU VACCINE NEED: Primary | ICD-10-CM

## 2023-09-12 DIAGNOSIS — I50.810 RIGHT HEART FAILURE WITH REDUCED RIGHT VENTRICULAR FUNCTION: ICD-10-CM

## 2023-09-12 DIAGNOSIS — J96.12 CHRONIC RESPIRATORY FAILURE WITH HYPERCAPNIA: ICD-10-CM

## 2023-09-12 DIAGNOSIS — Z86.711 HISTORY OF PULMONARY EMBOLUS (PE): ICD-10-CM

## 2023-09-12 DIAGNOSIS — F51.04 CHRONIC INSOMNIA: ICD-10-CM

## 2023-09-12 RX ORDER — ZOLPIDEM TARTRATE 10 MG/1
10 TABLET ORAL NIGHTLY PRN
Qty: 30 TABLET | Refills: 0 | OUTPATIENT
Start: 2023-09-12

## 2023-09-12 RX ORDER — PRIMIDONE 50 MG/1
TABLET ORAL
COMMUNITY
Start: 2023-07-03

## 2023-09-12 RX ORDER — ZOLPIDEM TARTRATE 10 MG/1
10 TABLET ORAL NIGHTLY PRN
Qty: 30 TABLET | Refills: 0 | Status: SHIPPED | OUTPATIENT
Start: 2023-09-12

## 2023-09-12 NOTE — PROGRESS NOTES
Primary Care Provider  Jose Adorno MD     Referring Provider  No ref. provider found     Chief Complaint  ILD, Sleep Apnea, Shortness of Breath, Wheezing, Cough, and Follow-up    Subjective          Braxton Mcghee Jr. presents to [facility]  Sleep Apnea  Associated symptoms include coughing.   Shortness of Breath  Associated symptoms include wheezing.   Wheezing   Associated symptoms include coughing and shortness of breath.   Cough  Associated symptoms include shortness of breath and wheezing.   Braxton Mcghee Jr. is a 58 y.o. male patient with severe primary pulmonary hypertension, polymyositis, interstitial lung disease, obstructive sleep apnea, obesity hypoventilation syndrome, chronic congestive heart failure with low ejection fraction and chronic respiratory failure, atrial flutter.  He is here for follow-up.  Since his last office visit, he has remained stable on therapy with tadalafil 40 mg once daily and macitentan 10 mg daily.  He remains on Bumex once daily.  He has gained about 10 pounds, today's weight is at 204 pounds.  He is back to using his CPAP machine regularly.  He uses nasal mask.. He is currently on Bumex daily and is on metolazone as needed.  He also had cardiac ablation done in [facility] in December 2022.  He is not as active, is not doing treadmill regularly now.  He is also on Eliquis 5 mg twice daily.  He has been on prednisone at 5 mg once daily and hydroxychloroquine for his polymyositis.  Per rheumatology, he would need to be on prednisone regularly.  His breathing is a little worse, has seen desaturations to 80s with heavy activities.  He is able to move around more and is active.  He still is able to go up and down the stairs. He still has to pace himself with activities.  He feels like his cough has significantly improved.  No nausea or vomiting.  No flushing or headache.  No significant chest pain  or chest tightness.  Complains of some cough when he lays down at times.  He goes to bed around 6 to 7 at night, gets out of bed around 4 in the morning.  He has to go to bed at times, does use Ambien for sleep intermittently.  He has also been trying Tylenol PM at times.  Low-dose Ambien has not helped in past.  No morning headaches.  No significant sleepiness during daytime.  He says some leg swelling in the evenings after heavy activities.    Review of Systems   Respiratory:  Positive for cough, shortness of breath and wheezing.  General:  Fatigue, No Fever No weight loss or loss of appetite  HEENT: No dysphagia, No Visual Changes, no rhinorrhea  Respiratory: Improving cough,+Dyspnea, minimal phlegm, No Pleuritic Pain, no wheezing, no hemoptysis.  Cardiovascular: Denies chest pain, denies palpitations,+FLORENTINO, No Chest Pressure  Gastrointestinal:  No Abdominal Pain, No Nausea, No Vomiting, No Diarrhea  Genitourinary:  No Dysuria, No Frequency, No Hesitancy  Musculoskeletal: No muscle pain, has bilateral leg swelling  Endocrine:  No Heat Intolerance, No Cold Intolerance, No Fatigue  Hematologic:  No Bleeding, No Bruising  Psychiatric:  No Anxiety, No Depression  Neurologic:  No Confusion, no Dysarthria, No Headaches  Skin:  No Rash, No Open Wounds, trace lower extremity edema    Family History   Problem Relation Age of Onset    Heart failure Mother     Lupus Mother     Stroke Mother     Cancer Father     Diabetes Brother         Social History     Socioeconomic History    Marital status:    Tobacco Use    Smoking status: Former     Packs/day: 1.00     Years: 30.00     Pack years: 30.00     Types: Cigarettes     Start date: 1981     Quit date: 11/15/2015     Years since quittin.8     Passive exposure: Past    Smokeless tobacco: Never   Vaping Use    Vaping Use: Never used    Passive vaping exposure: Yes   Substance and Sexual Activity    Alcohol use: Yes     Comment: occassionally    Drug use: Never     "Sexual activity: Defer        Past Medical History:   Diagnosis Date    Atrial flutter     CHF (congestive heart failure)     Hypertension     Interstitial lung disease     Polymyositis associated with connective tissue disorder     Pulmonary embolism     Pulmonary hypertension     Sleep apnea, obstructive         Immunization History   Administered Date(s) Administered    COVID-19 ([employer]) 04/07/2021    Flu Vaccine Intradermal Quad 18-64YR 10/01/2022    Flu Vaccine Quad PF >36MO 09/20/2018, 10/18/2019, 09/14/2020    Fluzone (or Fluarix & Flulaval for VFC) >6mos 09/20/2018, 10/18/2019, 09/14/2020, 10/26/2021, 10/26/2021, 09/20/2022, 09/20/2022, 09/12/2023    Influenza Split Preservative Free ID 10/02/2020, 10/26/2021, 09/20/2022    Pneumococcal Conjugate 20-Valent (PCV20) 09/12/2023    Pneumococcal Polysaccharide (PPSV23) 09/20/2018    Tdap 10/22/2021         Allergies   Allergen Reactions    Beta Adrenergic Blockers Nausea Only and Other (See Comments)     \"Made really sick/couldn't eat\"  Lightheaded, sweaty, nauseous, can not focus on anything           Current Outpatient Medications:     apixaban (Eliquis) 5 MG tablet tablet, Take 1 tablet by mouth 2 (Two) Times a Day. First dose tomorrow 12/29/2022, Disp: 180 tablet, Rfl: 3    bumetanide (BUMEX) 1 MG tablet, Take 1 tablet by mouth Daily., Disp: , Rfl:     denosumab (PROLIA) 60 MG/ML solution prefilled syringe syringe, Inject 1 mL under the skin into the appropriate area as directed., Disp: , Rfl:     Euthyrox 25 MCG tablet, Take 1 tablet by mouth Daily., Disp: , Rfl:     HYDROcodone-acetaminophen (NORCO)  MG per tablet, TAKE 1/2 TO 1 (ONE-HALF TO ONE) TABLET BY MOUTH EVERY 6 HOURS AS NEEDED FOR PAIN, Disp: , Rfl:     hydroxychloroquine (PLAQUENIL) 200 MG tablet, Take 1 tablet by mouth Daily., Disp: , Rfl:     metOLazone (ZAROXOLYN) 5 MG tablet, Take 1 tablet by mouth 2 (Two) Times a Day. (Patient taking differently: Take 1 tablet by mouth Daily.), Disp: " "180 tablet, Rfl: 3    omeprazole (priLOSEC) 20 MG capsule, Take 1 capsule by mouth Daily., Disp: , Rfl:     Opsumit 10 MG tablet, Take 1 tablet by mouth Daily., Disp: , Rfl:     predniSONE (DELTASONE) 5 MG tablet, Take 1 tablet by mouth Daily., Disp: , Rfl:     primidone (MYSOLINE) 50 MG tablet, , Disp: , Rfl:     spironolactone (ALDACTONE) 25 MG tablet, Take 1 tablet by mouth Daily., Disp: , Rfl:     tadalafil (ADCIRCA) 20 MG tablet tablet, Take 1 tablet by mouth 2 (Two) Times a Day., Disp: , Rfl:     zolpidem (AMBIEN) 10 MG tablet, Take 1 tablet by mouth At Night As Needed for Sleep., Disp: 30 tablet, Rfl: 0     Objective   Vital Signs:   /80 (BP Location: Left arm, Patient Position: Sitting, Cuff Size: Adult)   Pulse 83   Temp 98.6 °F (37 °C) (Tympanic)   Resp 18   Ht 172.7 cm (68\")   Wt 92.7 kg (204 lb 6.4 oz)   SpO2 97% Comment: room air/Nocturnal O2/2.5 liters/PRN  BMI 31.08 kg/m²     Mallampatti classification : 1  Physical Exam  Vital Signs Reviewed  Pleasant male, in no acute distress, Normal conversational  HEENT:  PERRL, EOMI.  OP, nares clear, no sinus tenderness  Neck:  Supple, no JVD, no thyromegaly  Lymph: no axillary, cervical, supraclavicular lymphadenopathy noted bilaterally  Chest:  good aeration, bilateral diminished breath sounds, no wheezing, no rhonchi, minimal crackles at bases resonant to percussion b/l  CV: RRR, no MGR, pulses 2+, equal.  Abd:  Soft, NT, ND, + BS, no HSM  EXT:  no clubbing, no cyanosis, trace BLE edema, left lower extremity, on lateral side has open wound, slowly healing  Neuro:  A&Ox3, CN grossly intact, no focal deficits  Skin: No rashes or lesions noted     Result Review :   The following data was reviewed by: Louis Geronimo MD on 12/16/2021:  Common labs          9/20/2022    07:06 12/28/2022    12:24 12/29/2022    11:53   Common Labs   Glucose  108     Glucose 72      99       BUN 12     25  14       Creatinine 0.73     0.79  0.77       Sodium 124     133 "  135       Potassium 4.2     4.3  4.5       Chloride 95     97  100       Calcium 8.6     9.1  9.6       WBC  9.32     Hemoglobin  13.2     Hematocrit  36.1     Platelets  271     Hemoglobin A1C  4.60        Details          This result is from an external source.             CMP          9/20/2022    07:06 12/28/2022    12:24 12/29/2022    11:53   CMP   Glucose  108     Glucose 72      99       BUN 12     25  14       Creatinine 0.73     0.79  0.77       EGFR  102.3     Sodium 124     133  135       Potassium 4.2     4.3  4.5       Chloride 95     97  100       Calcium 8.6     9.1  9.6       BUN/Creatinine Ratio  31.6     Anion Gap 6     8.0  7          Details          This result is from an external source.             CBC          12/28/2022    12:24   CBC   WBC 9.32    RBC 3.65    Hemoglobin 13.2    Hematocrit 36.1    MCV 98.9    MCH 36.2    MCHC 36.6    RDW 14.6    Platelets 271        Data reviewed: Radiologic studies Previous imaging from outside hospital was reviewed.  Outside office note from pulmonary hypertension clinic reviewed.        Overnight oximetry showed significant desaturation to less than 88% for more than 3 and half hours.         Assessment and Plan    Diagnoses and all orders for this visit:    1. Flu vaccine need (Primary)  -     Fluzone >6 Months ([id number])  -     Pneumococcal Conjugate Vaccine 20-Valent (PCV20)    2. Right ventricular dysfunction    3. Right heart failure with reduced right ventricular function    4. History of pulmonary embolus (PE)    5. ILD (interstitial lung disease)  -     Pneumococcal Conjugate Vaccine 20-Valent (PCV20)    6. Chronic respiratory failure with hypercapnia  -     Pneumococcal Conjugate Vaccine 20-Valent (PCV20)    7. Pulmonary arterial hypertension  -     Pneumococcal Conjugate Vaccine 20-Valent (PCV20)    8. DASIA (obstructive sleep apnea)  -     zolpidem (AMBIEN) 10 MG tablet; Take 1 tablet by mouth At Night As Needed for Sleep.  Dispense: 30 tablet;  Refill: 0  -     Pneumococcal Conjugate Vaccine 20-Valent (PCV20)    9. Chronic insomnia  -     zolpidem (AMBIEN) 10 MG tablet; Take 1 tablet by mouth At Night As Needed for Sleep.  Dispense: 30 tablet; Refill: 0  -     Pneumococcal Conjugate Vaccine 20-Valent (PCV20)    10. Tobacco abuse, in remission  -     Pneumococcal Conjugate Vaccine 20-Valent (PCV20)      Pulmonary hypertension: Per pulmonary hypertension clinic at [facility].  Continue Opsumit 10 mg once daily and tadalafil 40 mg once daily.  His repeat echocardiogram shows continued significant improvement in pulmonary artery pressure and right heart function.  Maintain his dry weight at 185 pounds or less.  He has gained 15+ pounds over the last 6 months. Continue with diuretics including metolazone as needed and regular Bumex.  Can drink water but + I cannot handle that it has to be dependent    Polymyositis and rheumatoid issues per Dr. Clayton.  Currently on hydroxychloroquine and prednisone.    Obstructive sleep apnea: We will review CPAP usage data once available.  He is compliant and adherent with it.  He continues to use oxygen bleed in with CPAP machine.  I received his BiPAP uses data once patient left the clinic.  His BiPAP setting is at 11 to 16 cm of water, EPAP at 11 cm of water, IPAP at 16 cm of water.  Apnea-hypopnea index on it is 5.3 which is improved from 8.3 on last office visit.  Continue at current setting.    Sleep hygiene was discussed.  Weight loss counseling was done.  He has lost more than 60 pounds over the last year, however gained 15+ pounds over the last 6 months.  Overnight oximetry showed significant desaturations.  Given his significant pulmonary hypertension, he needs to continue using his CPAP regularly.      Off oxygen with activities for now.   Advised him to keep himself active.  He is able to move around and walk more than a mile and a treadmill without need of oxygen now.    Continue DuoNeb as  needed.  Continue with Eliquis.    We will administer pneumococcal 20 vaccine as well as flu shot today.    Follow Up   Return in about 6 months (around 3/12/2024).  Patient was given instructions and counseling regarding his condition or for health maintenance advice. Please see specific information pulled into the AVS if appropriate.       Electronically signed by Louis Geronimo MD, 9/12/2023, 08:47 EDT.

## 2023-10-15 DIAGNOSIS — G47.33 OSA (OBSTRUCTIVE SLEEP APNEA): ICD-10-CM

## 2023-10-15 DIAGNOSIS — F51.04 CHRONIC INSOMNIA: ICD-10-CM

## 2023-10-16 DIAGNOSIS — G47.33 OSA (OBSTRUCTIVE SLEEP APNEA): ICD-10-CM

## 2023-10-16 DIAGNOSIS — F51.04 CHRONIC INSOMNIA: ICD-10-CM

## 2023-10-16 RX ORDER — ZOLPIDEM TARTRATE 10 MG/1
10 TABLET ORAL NIGHTLY PRN
Qty: 30 TABLET | Refills: 0 | OUTPATIENT
Start: 2023-10-16

## 2023-10-16 RX ORDER — ZOLPIDEM TARTRATE 10 MG/1
10 TABLET ORAL NIGHTLY PRN
Qty: 30 TABLET | Refills: 0 | Status: SHIPPED | OUTPATIENT
Start: 2023-10-16

## 2023-11-02 ENCOUNTER — OFFICE VISIT (OUTPATIENT)
Dept: CARDIOLOGY | Facility: CLINIC | Age: 58
End: 2023-11-02
Payer: COMMERCIAL

## 2023-11-02 VITALS
WEIGHT: 209 LBS | DIASTOLIC BLOOD PRESSURE: 92 MMHG | BODY MASS INDEX: 31.67 KG/M2 | HEIGHT: 68 IN | SYSTOLIC BLOOD PRESSURE: 154 MMHG | HEART RATE: 83 BPM

## 2023-11-02 DIAGNOSIS — I48.92 PAROXYSMAL ATRIAL FLUTTER: Primary | ICD-10-CM

## 2023-11-02 DIAGNOSIS — I27.21 PULMONARY ARTERIAL HYPERTENSION: ICD-10-CM

## 2023-11-02 DIAGNOSIS — I27.20 PULMONARY HYPERTENSION: ICD-10-CM

## 2023-11-02 DIAGNOSIS — I10 ESSENTIAL HYPERTENSION: ICD-10-CM

## 2023-11-02 PROCEDURE — 99214 OFFICE O/P EST MOD 30 MIN: CPT | Performed by: INTERNAL MEDICINE

## 2023-11-02 RX ORDER — LISINOPRIL 5 MG/1
5 TABLET ORAL DAILY
Qty: 90 TABLET | Refills: 3 | Status: SHIPPED | OUTPATIENT
Start: 2023-11-02

## 2023-11-02 RX ORDER — LISINOPRIL 5 MG/1
5 TABLET ORAL DAILY
Qty: 90 TABLET | Refills: 3 | Status: SHIPPED | OUTPATIENT
Start: 2023-11-02 | End: 2023-11-02

## 2023-11-02 NOTE — ASSESSMENT & PLAN NOTE
Recommend the addition of lisinopril 5 mg once a day for blood pressure control repeat BMP in 2 weeks

## 2023-11-02 NOTE — ASSESSMENT & PLAN NOTE
Symptomatically doing well maintaining normal sinus rhythm continue with Eliquis 5 twice daily repeat EKG on next visit

## 2023-11-02 NOTE — ASSESSMENT & PLAN NOTE
Patient is on Adcirca and OPS U IAN followed at the [facility] hypertension clinic

## 2023-11-02 NOTE — PROGRESS NOTES
Chief Complaint  Follow-up and Paroxysmal atrial flutter    Subjective    Patient is a 58-year-old with a prior history of paroxysmal atrial flutter with ablation procedure, pulmonary arterial hypertension thought to be secondary to interstitial lung disease, history of polymyositis, and prior pulmonary embolism who follows up today and feels that has been doing better he has not had any significant prolonged tachycardic spells since his ablation procedure.  His breathing capacity while limited remains stable he does use home oxygen therapy with exertion at home.  He has not had any problems with increased lower extremity edema he has had elevation his blood pressures recently at home  Past Medical History:   Diagnosis Date    Atrial flutter     CHF (congestive heart failure)     Hypertension     Interstitial lung disease     Polymyositis associated with connective tissue disorder     Pulmonary embolism     Pulmonary hypertension     Sleep apnea, obstructive          Current Outpatient Medications:     apixaban (Eliquis) 5 MG tablet tablet, Take 1 tablet by mouth 2 (Two) Times a Day. First dose tomorrow 12/29/2022, Disp: 180 tablet, Rfl: 3    bumetanide (BUMEX) 1 MG tablet, Take 1 tablet by mouth Daily., Disp: , Rfl:     denosumab (PROLIA) 60 MG/ML solution prefilled syringe syringe, Inject 1 mL under the skin into the appropriate area as directed., Disp: , Rfl:     Euthyrox 25 MCG tablet, Take 1 tablet by mouth Daily., Disp: , Rfl:     hydroxychloroquine (PLAQUENIL) 200 MG tablet, Take 1 tablet by mouth Daily., Disp: , Rfl:     omeprazole (priLOSEC) 20 MG capsule, Take 1 capsule by mouth Daily., Disp: , Rfl:     Opsumit 10 MG tablet, Take 1 tablet by mouth Daily., Disp: , Rfl:     predniSONE (DELTASONE) 5 MG tablet, Take 1 tablet by mouth Daily., Disp: , Rfl:     spironolactone (ALDACTONE) 25 MG tablet, Take 1 tablet by mouth Daily., Disp: , Rfl:     tadalafil (ADCIRCA) 20 MG tablet tablet, Take 1 tablet by mouth 2  "(Two) Times a Day., Disp: , Rfl:     zolpidem (AMBIEN) 10 MG tablet, TAKE 1 TABLET BY MOUTH AT NIGHT AS NEEDED FOR SLEEP, Disp: 30 tablet, Rfl: 0    HYDROcodone-acetaminophen (NORCO)  MG per tablet, TAKE 1/2 TO 1 (ONE-HALF TO ONE) TABLET BY MOUTH EVERY 6 HOURS AS NEEDED FOR PAIN (Patient not taking: Reported on 2023), Disp: , Rfl:     lisinopril (PRINIVIL,ZESTRIL) 5 MG tablet, Take 1 tablet by mouth Daily., Disp: 90 tablet, Rfl: 3    metOLazone (ZAROXOLYN) 5 MG tablet, Take 1 tablet by mouth 2 (Two) Times a Day. (Patient not taking: Reported on 2023), Disp: 180 tablet, Rfl: 3    primidone (MYSOLINE) 50 MG tablet, , Disp: , Rfl:     Medications Discontinued During This Encounter   Medication Reason    lisinopril (PRINIVIL,ZESTRIL) 5 MG tablet      Allergies   Allergen Reactions    Beta Adrenergic Blockers Nausea Only and Other (See Comments)     \"Made really sick/couldn't eat\"  Lightheaded, sweaty, nauseous, can not focus on anything         Social History     Tobacco Use    Smoking status: Former     Packs/day: 1.00     Years: 30.00     Additional pack years: 0.00     Total pack years: 30.00     Types: Cigarettes     Start date: 1981     Quit date: 11/15/2015     Years since quittin.9     Passive exposure: Past    Smokeless tobacco: Never   Vaping Use    Vaping Use: Never used    Passive vaping exposure: Yes   Substance Use Topics    Alcohol use: Yes     Comment: occassionally    Drug use: Never       Family History   Problem Relation Age of Onset    Heart failure Mother     Lupus Mother     Stroke Mother     Cancer Father     Diabetes Brother         Objective     /92   Pulse 83   Ht 172.7 cm (68\")   Wt 94.8 kg (209 lb)   BMI 31.78 kg/m²       Physical Exam    General Appearance:   no acute distress  Alert and oriented x3  HENT:   lips not cyanotic  Atraumatic  Neck:  No jvd   supple  Respiratory:  no respiratory distress  normal breath sounds  no rales  Cardiovascular:  Regular " "rate and rhythm  no S3, no S4   no murmur  no rub  Extremities  No cyanosis  lower extremity edema: none    Skin:   warm, dry  No rashes      Result Review :     No results found for: \"PROBNP\"  CMP          12/28/2022    12:24 12/29/2022    11:53   CMP   Glucose 108     Glucose  99       BUN 25  14       Creatinine 0.79  0.77       EGFR 102.3     Sodium 133  135       Potassium 4.3  4.5       Chloride 97  100       Calcium 9.1  9.6       BUN/Creatinine Ratio 31.6     Anion Gap 8.0  7          Details          This result is from an external source.             CBC w/diff          12/28/2022    12:24   CBC w/Diff   WBC 9.32    RBC 3.65    Hemoglobin 13.2    Hematocrit 36.1    MCV 98.9    MCH 36.2    MCHC 36.6    RDW 14.6    Platelets 271       Lab Results   Component Value Date    TSH 2.894 09/14/2022      Lab Results   Component Value Date    FREET4 0.87 04/05/2022      No results found for: \"DDIMERQUANT\"  Magnesium   Date Value Ref Range Status   11/15/2022 1.8 1.6 - 2.6 mg/dL Final      No results found for: \"DIGOXIN\"   No results found for: \"TROPONINT\"          No results found for: \"POCTROP\"                   Diagnoses and all orders for this visit:    1. Paroxysmal atrial flutter (Primary)  Assessment & Plan:  Symptomatically doing well maintaining normal sinus rhythm continue with Eliquis 5 twice daily repeat EKG on next visit      2. Essential hypertension  Assessment & Plan:  Recommend the addition of lisinopril 5 mg once a day for blood pressure control repeat BMP in 2 weeks      3. Pulmonary hypertension  -     Basic Metabolic Panel; Future    4. Pulmonary arterial hypertension  Assessment & Plan:  Patient is on Adcirca and OPS U IAN followed at the [place] [facility] hypertension clinic      Other orders  -     Discontinue: lisinopril (PRINIVIL,ZESTRIL) 5 MG tablet; Take 1 tablet by mouth Daily.  Dispense: 90 tablet; Refill: 3  -     lisinopril (PRINIVIL,ZESTRIL) 5 MG tablet; Take 1 tablet by mouth Daily.  " Dispense: 90 tablet; Refill: 3            Follow Up     Return in about 6 months (around 5/2/2024) for EKG with F/U.          Patient was given instructions and counseling regarding his condition or for health maintenance advice. Please see specific information pulled into the AVS if appropriate.

## 2023-11-14 DIAGNOSIS — G47.33 OSA (OBSTRUCTIVE SLEEP APNEA): ICD-10-CM

## 2023-11-14 DIAGNOSIS — F51.04 CHRONIC INSOMNIA: ICD-10-CM

## 2023-11-15 DIAGNOSIS — I27.20 PULMONARY HYPERTENSION: ICD-10-CM

## 2023-11-16 ENCOUNTER — TELEPHONE (OUTPATIENT)
Dept: CARDIOLOGY | Facility: CLINIC | Age: 58
End: 2023-11-16
Payer: COMMERCIAL

## 2023-11-16 NOTE — TELEPHONE ENCOUNTER
----- Message from SATISH Hernandez sent at 11/15/2023  2:43 PM EST -----  Renal function and electrolytes are good, continue current meds  ----- Message -----  From: Petra Carrington APRN  Sent: 11/15/2023   2:02 PM EST  To: SATISH Hernandez      ----- Message -----  From: Cholo Muir  Sent: 11/15/2023   1:38 PM EST  To: Yony Kunz MD

## 2023-11-20 ENCOUNTER — TELEPHONE (OUTPATIENT)
Dept: PULMONOLOGY | Facility: CLINIC | Age: 58
End: 2023-11-20
Payer: COMMERCIAL

## 2023-11-20 RX ORDER — ZOLPIDEM TARTRATE 10 MG/1
10 TABLET ORAL NIGHTLY PRN
Qty: 30 TABLET | Refills: 3 | Status: SHIPPED | OUTPATIENT
Start: 2023-11-20

## 2023-11-20 NOTE — TELEPHONE ENCOUNTER
Patient called and is asking for a refill of his Ambien. He stated that he has been out since Saturday 11/18/2023. Patient would like a call back

## 2024-02-12 DIAGNOSIS — I27.21 PULMONARY ARTERIAL HYPERTENSION: ICD-10-CM

## 2024-02-12 DIAGNOSIS — I48.92 PAROXYSMAL ATRIAL FLUTTER: ICD-10-CM

## 2024-02-12 RX ORDER — APIXABAN 5 MG/1
5 TABLET, FILM COATED ORAL 2 TIMES DAILY
Qty: 60 TABLET | Refills: 0 | Status: SHIPPED | OUTPATIENT
Start: 2024-02-12

## 2024-02-23 ENCOUNTER — TELEPHONE (OUTPATIENT)
Dept: CARDIOLOGY | Facility: CLINIC | Age: 59
End: 2024-02-23
Payer: COMMERCIAL

## 2024-02-23 NOTE — TELEPHONE ENCOUNTER
Procedure: Colonoscopy and/or EGD     Med Directive: Eliquis     PMH: aflutter sp ablation, HTN, pulmonary HTN     Last Seen: 11/2/23

## 2024-02-23 NOTE — TELEPHONE ENCOUNTER
[facility] received a fax that requires your attention. The document has been indexed to the patient’s chart for your review.      Reason for sending: EXTERNAL MEDICAL RECORD NOTIFICATION     Documents Description: PHYS ORD-[facility] CARDIAC CLEARANCE REQ-[id number]    Name of Sender: [facility]     Date Indexed: 2.22.24    INCLUDED WITH CLEARANCE REQUEST WAS A PROG NOTE DATED 11.29.23 & LABS DATED 11.14.23    SURGERY/COLONOSCOPY IS ON 3.1.24

## 2024-03-18 ENCOUNTER — TELEPHONE (OUTPATIENT)
Dept: PULMONOLOGY | Facility: CLINIC | Age: 59
End: 2024-03-18

## 2024-03-20 ENCOUNTER — OFFICE VISIT (OUTPATIENT)
Dept: PULMONOLOGY | Facility: CLINIC | Age: 59
End: 2024-03-20
Payer: COMMERCIAL

## 2024-03-20 ENCOUNTER — LAB (OUTPATIENT)
Dept: LAB | Facility: HOSPITAL | Age: 59
End: 2024-03-20
Payer: COMMERCIAL

## 2024-03-20 VITALS
SYSTOLIC BLOOD PRESSURE: 120 MMHG | OXYGEN SATURATION: 94 % | HEIGHT: 68 IN | BODY MASS INDEX: 33.36 KG/M2 | RESPIRATION RATE: 16 BRPM | HEART RATE: 72 BPM | WEIGHT: 220.1 LBS | DIASTOLIC BLOOD PRESSURE: 77 MMHG

## 2024-03-20 DIAGNOSIS — M33.29: ICD-10-CM

## 2024-03-20 DIAGNOSIS — I50.810 RIGHT HEART FAILURE WITH REDUCED RIGHT VENTRICULAR FUNCTION: Primary | ICD-10-CM

## 2024-03-20 DIAGNOSIS — I50.810 RIGHT HEART FAILURE WITH REDUCED RIGHT VENTRICULAR FUNCTION: ICD-10-CM

## 2024-03-20 DIAGNOSIS — I27.21 PULMONARY ARTERIAL HYPERTENSION: ICD-10-CM

## 2024-03-20 DIAGNOSIS — G47.33 OSA (OBSTRUCTIVE SLEEP APNEA): ICD-10-CM

## 2024-03-20 DIAGNOSIS — J84.9 ILD (INTERSTITIAL LUNG DISEASE): ICD-10-CM

## 2024-03-20 DIAGNOSIS — Z86.711 HISTORY OF PULMONARY EMBOLUS (PE): ICD-10-CM

## 2024-03-20 DIAGNOSIS — F51.04 CHRONIC INSOMNIA: ICD-10-CM

## 2024-03-20 DIAGNOSIS — J96.12 CHRONIC RESPIRATORY FAILURE WITH HYPERCAPNIA: ICD-10-CM

## 2024-03-20 LAB
AMPHET+METHAMPHET UR QL: NEGATIVE
BARBITURATES UR QL SCN: NEGATIVE
BENZODIAZ UR QL SCN: POSITIVE
CANNABINOIDS SERPL QL: NEGATIVE
COCAINE UR QL: NEGATIVE
FENTANYL UR-MCNC: NEGATIVE NG/ML
METHADONE UR QL SCN: NEGATIVE
OPIATES UR QL: NEGATIVE
OXYCODONE UR QL SCN: NEGATIVE

## 2024-03-20 PROCEDURE — 99214 OFFICE O/P EST MOD 30 MIN: CPT | Performed by: INTERNAL MEDICINE

## 2024-03-20 PROCEDURE — 80307 DRUG TEST PRSMV CHEM ANLYZR: CPT

## 2024-03-20 RX ORDER — ASCORBIC ACID 500 MG
TABLET ORAL
COMMUNITY
Start: 2024-03-18

## 2024-03-20 RX ORDER — FERROUS SULFATE 324(65)MG
1 TABLET, DELAYED RELEASE (ENTERIC COATED) ORAL DAILY
COMMUNITY
Start: 2024-03-15

## 2024-03-20 RX ORDER — LEVOTHYROXINE SODIUM 0.03 MG/1
25 TABLET ORAL
COMMUNITY

## 2024-03-20 RX ORDER — SIMETHICONE 180 MG/1
CAPSULE, LIQUID FILLED ORAL
COMMUNITY
Start: 2023-11-29

## 2024-03-20 RX ORDER — SODIUM, POTASSIUM,MAG SULFATES 17.5-3.13G
SOLUTION, RECONSTITUTED, ORAL ORAL SEE ADMIN INSTRUCTIONS
COMMUNITY
Start: 2023-11-29

## 2024-03-20 RX ORDER — TADALAFIL 20 MG/1
2 TABLET ORAL DAILY
COMMUNITY
Start: 2024-01-22

## 2024-03-20 RX ORDER — ZOLPIDEM TARTRATE 10 MG/1
10 TABLET ORAL NIGHTLY PRN
Qty: 30 TABLET | Refills: 5 | Status: SHIPPED | OUTPATIENT
Start: 2024-03-20

## 2024-03-20 NOTE — PROGRESS NOTES
Primary Care Provider  Jose Adorno MD     Referring Provider  No ref. provider found     Chief Complaint  Sleep Apnea, ILD, Shortness of Breath, Wheezing, COPD, and Follow-up (6 month follow up)    Subjective          Braxton Mcghee Jr. presents to [facility]  Sleep Apnea  Associated symptoms include coughing.   Shortness of Breath  Associated symptoms include wheezing.   Wheezing   Associated symptoms include coughing and shortness of breath.   Cough  Associated symptoms include shortness of breath and wheezing.   COPD  He complains of cough, shortness of breath and wheezing.     Braxton Mcghee Jr. is a 59 y.o. male patient with severe primary pulmonary hypertension, polymyositis, interstitial lung disease, obstructive sleep apnea, obesity hypoventilation syndrome, chronic congestive heart failure with low ejection fraction and chronic respiratory failure, atrial flutter.  He is here for follow-up.  Since his last office visit, he has remained stable on therapy with tadalafil 40 mg once daily and macitentan 10 mg daily.  He remains on Bumex once daily.  He has gained about 35 pounds, today's weight is at 220 pounds.  He is using his BiPAP regularly.  He uses nasal mask.. He is currently on Bumex daily and is on metolazone as needed.  He also had cardiac ablation done in [facility] in December 2022. He is also on Eliquis 5 mg twice daily.  He has been on prednisone at 5 mg once daily and hydroxychloroquine for his polymyositis.  He was recently diagnosed with osteoporosis and was told to increase he is cheese and daily products uses. Per rheumatology, he would need to be on prednisone regularly.  His breathing is a little worse, has seen desaturations to 70s at times with activities, has dizziness spells at times.  He is able to move around more and is active.  He still is able to go up and down the stairs. He still has to pace  himself with activities.  No nausea or vomiting.  No flushing or headache.  No significant chest pain or chest tightness.  Complains of some cough when he lays down at times.  He goes to bed around 6 to 7 at night, gets out of bed around 4 in the morning.  He has to go to bed at times, does use Ambien for sleep intermittently.  He has also been trying Tylenol PM at times.  Low-dose Ambien has not helped in past.  No morning headaches.  No significant sleepiness during daytime.  He says some leg swelling in the evenings after heavy activities.    Review of Systems   Respiratory:  Positive for cough, shortness of breath and wheezing.    General:  Fatigue, No Fever No weight loss or loss of appetite  HEENT: No dysphagia, No Visual Changes, no rhinorrhea  Respiratory: Improving cough,+Dyspnea, minimal phlegm, No Pleuritic Pain, no wheezing, no hemoptysis.  Cardiovascular: Denies chest pain, denies palpitations,+FLORENTNIO, No Chest Pressure  Gastrointestinal:  No Abdominal Pain, No Nausea, No Vomiting, No Diarrhea  Genitourinary:  No Dysuria, No Frequency, No Hesitancy  Musculoskeletal: No muscle pain, has bilateral leg swelling  Endocrine:  No Heat Intolerance, No Cold Intolerance, No Fatigue  Hematologic:  No Bleeding, No Bruising  Psychiatric:  No Anxiety, No Depression  Neurologic:  No Confusion, no Dysarthria, No Headaches  Skin:  No Rash, No Open Wounds, trace lower extremity edema    Family History   Problem Relation Age of Onset    Heart failure Mother     Lupus Mother     Stroke Mother     Cancer Father     Diabetes Brother         Social History     Socioeconomic History    Marital status:    Tobacco Use    Smoking status: Former     Current packs/day: 0.00     Average packs/day: 1 pack/day for 34.3 years (34.3 ttl pk-yrs)     Types: Cigarettes     Start date: 1981     Quit date: 11/15/2015     Years since quittin.3     Passive exposure: Past    Smokeless tobacco: Never   Vaping Use    Vaping status:  "Never Used    Passive vaping exposure: Yes   Substance and Sexual Activity    Alcohol use: Yes     Comment: occassionally    Drug use: Never    Sexual activity: Defer        Past Medical History:   Diagnosis Date    Atrial flutter     CHF (congestive heart failure)     Hypertension     Interstitial lung disease     Polymyositis associated with connective tissue disorder     Pulmonary embolism     Pulmonary hypertension     Sleep apnea, obstructive         Immunization History   Administered Date(s) Administered    COVID-19 (CLAUDINE) 04/07/2021    COVID-19 F23 (PFIZER) 12YRS+ (COMIRNATY) 10/16/2023    Flu Vaccine Intradermal Quad 18-64YR 10/01/2022    Flu Vaccine Quad PF >36MO 09/20/2018, 10/18/2019, 09/14/2020    Fluzone (or Fluarix & Flulaval for VFC) >6mos 09/20/2018, 10/18/2019, 09/14/2020, 10/26/2021, 10/26/2021, 09/20/2022, 09/20/2022, 09/12/2023    Influenza Split Preservative Free ID 10/02/2020, 10/26/2021, 09/20/2022    Pneumococcal Conjugate 20-Valent (PCV20) 09/12/2023    Pneumococcal Polysaccharide (PPSV23) 09/20/2018    Tdap 10/22/2021         Allergies   Allergen Reactions    Beta Adrenergic Blockers Nausea Only and Other (See Comments)     \"Made really sick/couldn't eat\"  Lightheaded, sweaty, nauseous, can not focus on anything           Current Outpatient Medications:     bumetanide (BUMEX) 1 MG tablet, Take 1 tablet by mouth Daily., Disp: , Rfl:     denosumab (PROLIA) 60 MG/ML solution prefilled syringe syringe, Inject 1 mL under the skin into the appropriate area as directed., Disp: , Rfl:     Eliquis 5 MG tablet tablet, Take 1 tablet by mouth twice daily, Disp: 60 tablet, Rfl: 0    ferrous sulfate 324 (65 Fe) MG tablet delayed-release EC tablet, Take 1 tablet by mouth Daily., Disp: , Rfl:     hydroxychloroquine (PLAQUENIL) 200 MG tablet, Take 1 tablet by mouth Daily., Disp: , Rfl:     levothyroxine (SYNTHROID, LEVOTHROID) 25 MCG tablet, Take 1 tablet by mouth Every Morning., Disp: , Rfl:     " lisinopril (PRINIVIL,ZESTRIL) 5 MG tablet, Take 1 tablet by mouth Daily., Disp: 90 tablet, Rfl: 3    omeprazole (priLOSEC) 20 MG capsule, Take 1 capsule by mouth Daily., Disp: , Rfl:     Opsumit 10 MG tablet, Take 1 tablet by mouth Daily., Disp: , Rfl:     predniSONE (DELTASONE) 5 MG tablet, Take 1 tablet by mouth Daily., Disp: , Rfl:     spironolactone (ALDACTONE) 25 MG tablet, Take 1 tablet by mouth Daily., Disp: , Rfl:     tadalafil (CIALIS) 20 MG tablet, Take 2 tablets by mouth Daily., Disp: , Rfl:     vitamin C (ASCORBIC ACID) 500 MG tablet, , Disp: , Rfl:     zolpidem (AMBIEN) 10 MG tablet, TAKE 1 TABLET BY MOUTH AT NIGHT AS NEEDED FOR SLEEP, Disp: 30 tablet, Rfl: 3    Euthyrox 25 MCG tablet, Take 1 tablet by mouth Daily. (Patient not taking: Reported on 3/20/2024), Disp: , Rfl:     HYDROcodone-acetaminophen (NORCO)  MG per tablet, TAKE 1/2 TO 1 (ONE-HALF TO ONE) TABLET BY MOUTH EVERY 6 HOURS AS NEEDED FOR PAIN (Patient not taking: Reported on 11/2/2023), Disp: , Rfl:     metOLazone (ZAROXOLYN) 5 MG tablet, Take 1 tablet by mouth 2 (Two) Times a Day. (Patient not taking: Reported on 11/2/2023), Disp: 180 tablet, Rfl: 3    primidone (MYSOLINE) 50 MG tablet, , Disp: , Rfl:     Simethicone Ultra Strength 180 MG capsule, TAKE 2 CAPSULES BY MOUTH WITH EVENING PREP AND 1 CAPSULE WITH MORNING PREP (Patient not taking: Reported on 3/20/2024), Disp: , Rfl:     sodium-potassium-magnesium sulfates (SUPREP) 17.5-3.13-1.6 GM/177ML solution oral solution, See Admin Instructions. (Patient not taking: Reported on 3/20/2024), Disp: , Rfl:     tadalafil (ADCIRCA) 20 MG tablet tablet, Take 1 tablet by mouth 2 (Two) Times a Day., Disp: , Rfl:      Objective   Vital Signs:   There were no vitals taken for this visit.    Mallampatti classification : 1  Physical Exam  Vital Signs Reviewed  Pleasant male, in no acute distress, Normal conversational  HEENT:  PERRL, EOMI.  OP, nares clear, no sinus tenderness  Neck:  Supple, no  JVD, no thyromegaly  Lymph: no axillary, cervical, supraclavicular lymphadenopathy noted bilaterally  Chest:  good aeration, bilateral diminished breath sounds, no wheezing, no rhonchi, minimal crackles at bases resonant to percussion b/l  CV: RRR, no MGR, pulses 2+, equal.  Abd:  Soft, NT, ND, + BS, no HSM  EXT:  no clubbing, no cyanosis, trace BLE edema, left lower extremity, on lateral side has open wound, slowly healing  Neuro:  A&Ox3, CN grossly intact, no focal deficits  Skin: No rashes or lesions noted     Result Review :   The following data was reviewed by: Louis Geronimo MD on 12/16/2021:                Data reviewed: Radiologic studies Previous imaging from outside hospital was reviewed.  Outside office note from pulmonary hypertension clinic reviewed.        Overnight oximetry showed significant desaturation to less than 88% for more than 3 and half hours.         Assessment and Plan    There are no diagnoses linked to this encounter.    Pulmonary hypertension: Per pulmonary hypertension clinic at [facility].  Continue Opsumit 10 mg once daily and tadalafil 40 mg once daily.  His repeat echocardiogram previously showed continued significant improvement in pulmonary artery pressure and right heart function.  He is going to follow-up with pulmonary hypertension clinic in [place] next month.  Echocardiogram is to be done then.  Maintain his dry weight at 185 pounds or less.  He has gained 15+ pounds over the last 6 months. Continue with diuretics including metolazone as needed and regular Bumex.      Polymyositis and rheumatoid issues per Dr. Clayton.  Currently on hydroxychloroquine and prednisone.    Obstructive sleep apnea: We will review CPAP usage data once available.  He is compliant and adherent with it.  He continues to use oxygen bleed in with BiPAP 16/11 machine.  Will review BiPAP usage data once available.  Sleep hygiene was discussed.  Weight loss counseling was done.  He has lost  more than 60 pounds over the last year, however gained 35+ pounds over the last 6 months.  Overnight oximetry showed significant desaturations.  Given his significant pulmonary hypertension, he needs to continue using his CPAP regularly.      Off oxygen with activities for now.   Advised him to keep himself active.  He is able to move around and walk more than a mile and a treadmill without need of oxygen now.    Continue DuoNeb as needed.  Continue with Eliquis.    Administered pneumococcal 20 vaccine last office visit.  We will address RSV vaccination next office visit.    Follow Up   No follow-ups on file.  Patient was given instructions and counseling regarding his condition or for health maintenance advice. Please see specific information pulled into the AVS if appropriate.       Electronically signed by Louis Geronimo MD, 3/20/2024, 08:41 EDT.

## 2024-05-24 DIAGNOSIS — I48.92 PAROXYSMAL ATRIAL FLUTTER: ICD-10-CM

## 2024-05-24 DIAGNOSIS — I27.21 PULMONARY ARTERIAL HYPERTENSION: ICD-10-CM

## 2024-05-24 RX ORDER — APIXABAN 5 MG/1
5 TABLET, FILM COATED ORAL 2 TIMES DAILY
Qty: 60 TABLET | Refills: 0 | OUTPATIENT
Start: 2024-05-24

## 2024-06-27 DIAGNOSIS — I27.21 PULMONARY ARTERIAL HYPERTENSION: ICD-10-CM

## 2024-06-27 DIAGNOSIS — I48.92 PAROXYSMAL ATRIAL FLUTTER: ICD-10-CM

## 2024-06-27 RX ORDER — APIXABAN 5 MG/1
5 TABLET, FILM COATED ORAL 2 TIMES DAILY
Qty: 60 TABLET | Refills: 3 | Status: SHIPPED | OUTPATIENT
Start: 2024-06-27

## 2024-09-22 DIAGNOSIS — G47.33 OSA (OBSTRUCTIVE SLEEP APNEA): ICD-10-CM

## 2024-09-22 DIAGNOSIS — J96.12 CHRONIC RESPIRATORY FAILURE WITH HYPERCAPNIA: ICD-10-CM

## 2024-09-22 DIAGNOSIS — Z86.711 HISTORY OF PULMONARY EMBOLUS (PE): ICD-10-CM

## 2024-09-22 DIAGNOSIS — F51.04 CHRONIC INSOMNIA: ICD-10-CM

## 2024-09-22 DIAGNOSIS — I50.810 RIGHT HEART FAILURE WITH REDUCED RIGHT VENTRICULAR FUNCTION: ICD-10-CM

## 2024-09-22 DIAGNOSIS — M33.29: ICD-10-CM

## 2024-09-22 DIAGNOSIS — I27.21 PULMONARY ARTERIAL HYPERTENSION: ICD-10-CM

## 2024-09-22 DIAGNOSIS — J84.9 ILD (INTERSTITIAL LUNG DISEASE): ICD-10-CM

## 2024-09-23 RX ORDER — ZOLPIDEM TARTRATE 10 MG/1
10 TABLET ORAL NIGHTLY PRN
Qty: 30 TABLET | Refills: 0 | Status: SHIPPED | OUTPATIENT
Start: 2024-09-23 | End: 2024-09-26 | Stop reason: SDUPTHER

## 2024-09-26 ENCOUNTER — OFFICE VISIT (OUTPATIENT)
Dept: PULMONOLOGY | Facility: CLINIC | Age: 59
End: 2024-09-26
Payer: COMMERCIAL

## 2024-09-26 VITALS
TEMPERATURE: 98.6 F | OXYGEN SATURATION: 94 % | DIASTOLIC BLOOD PRESSURE: 84 MMHG | SYSTOLIC BLOOD PRESSURE: 156 MMHG | WEIGHT: 231.1 LBS | RESPIRATION RATE: 16 BRPM | HEART RATE: 84 BPM | HEIGHT: 68 IN | BODY MASS INDEX: 35.03 KG/M2

## 2024-09-26 DIAGNOSIS — I51.9 RIGHT VENTRICULAR DYSFUNCTION: Primary | ICD-10-CM

## 2024-09-26 DIAGNOSIS — I50.810 RIGHT HEART FAILURE WITH REDUCED RIGHT VENTRICULAR FUNCTION: ICD-10-CM

## 2024-09-26 DIAGNOSIS — G47.33 OSA (OBSTRUCTIVE SLEEP APNEA): ICD-10-CM

## 2024-09-26 DIAGNOSIS — F51.04 CHRONIC INSOMNIA: ICD-10-CM

## 2024-09-26 DIAGNOSIS — I27.21 PULMONARY ARTERIAL HYPERTENSION: ICD-10-CM

## 2024-09-26 DIAGNOSIS — M33.29: ICD-10-CM

## 2024-09-26 DIAGNOSIS — Z86.711 HISTORY OF PULMONARY EMBOLUS (PE): ICD-10-CM

## 2024-09-26 DIAGNOSIS — J84.9 ILD (INTERSTITIAL LUNG DISEASE): ICD-10-CM

## 2024-09-26 DIAGNOSIS — J96.12 CHRONIC RESPIRATORY FAILURE WITH HYPERCAPNIA: ICD-10-CM

## 2024-09-26 RX ORDER — ESCITALOPRAM OXALATE 10 MG/1
TABLET ORAL
COMMUNITY
Start: 2024-09-09

## 2024-09-26 RX ORDER — ZOLPIDEM TARTRATE 10 MG/1
10 TABLET ORAL NIGHTLY PRN
Qty: 30 TABLET | Refills: 0 | Status: SHIPPED | OUTPATIENT
Start: 2024-09-26

## 2024-10-15 DIAGNOSIS — I48.92 PAROXYSMAL ATRIAL FLUTTER: ICD-10-CM

## 2024-10-15 DIAGNOSIS — I27.21 PULMONARY ARTERIAL HYPERTENSION: ICD-10-CM

## 2024-10-15 RX ORDER — APIXABAN 5 MG/1
5 TABLET, FILM COATED ORAL 2 TIMES DAILY
Qty: 60 TABLET | Refills: 0 | OUTPATIENT
Start: 2024-10-15

## 2024-11-20 DIAGNOSIS — I51.9 RIGHT VENTRICULAR DYSFUNCTION: ICD-10-CM

## 2024-11-20 DIAGNOSIS — M33.29: ICD-10-CM

## 2024-11-20 DIAGNOSIS — I27.21 PULMONARY ARTERIAL HYPERTENSION: ICD-10-CM

## 2024-11-20 DIAGNOSIS — F51.04 CHRONIC INSOMNIA: ICD-10-CM

## 2024-11-20 DIAGNOSIS — G47.33 OSA (OBSTRUCTIVE SLEEP APNEA): ICD-10-CM

## 2024-11-20 DIAGNOSIS — Z86.711 HISTORY OF PULMONARY EMBOLUS (PE): ICD-10-CM

## 2024-11-20 DIAGNOSIS — I50.810 RIGHT HEART FAILURE WITH REDUCED RIGHT VENTRICULAR FUNCTION: ICD-10-CM

## 2024-11-20 DIAGNOSIS — J84.9 ILD (INTERSTITIAL LUNG DISEASE): ICD-10-CM

## 2024-11-20 DIAGNOSIS — J96.12 CHRONIC RESPIRATORY FAILURE WITH HYPERCAPNIA: ICD-10-CM

## 2024-11-20 RX ORDER — ZOLPIDEM TARTRATE 10 MG/1
10 TABLET ORAL NIGHTLY PRN
Qty: 30 TABLET | Refills: 0 | OUTPATIENT
Start: 2024-11-20

## 2024-12-02 RX ORDER — LISINOPRIL 5 MG/1
5 TABLET ORAL DAILY
Qty: 30 TABLET | Refills: 0 | Status: SHIPPED | OUTPATIENT
Start: 2024-12-02

## 2024-12-27 RX ORDER — LISINOPRIL 5 MG/1
5 TABLET ORAL DAILY
Qty: 90 TABLET | Refills: 0 | Status: SHIPPED | OUTPATIENT
Start: 2024-12-27

## 2025-03-27 RX ORDER — LISINOPRIL 5 MG/1
5 TABLET ORAL DAILY
Qty: 90 TABLET | Refills: 1 | Status: SHIPPED | OUTPATIENT
Start: 2025-03-27

## (undated) DEVICE — INTRO SHEATH ENGAGE W/50 GW .038 7F12

## (undated) DEVICE — DRSNG SURESITE123 4X4.8IN

## (undated) DEVICE — SYS CLS VASC/VENI VASCADE MVP 6TO12F

## (undated) DEVICE — Device: Brand: SMARTABLATE

## (undated) DEVICE — Device: Brand: REFERENCE PATCH CARTO 3

## (undated) DEVICE — SOL NACL 0.9PCT 1000ML

## (undated) DEVICE — Device: Brand: THERMOCOOL SF NAV

## (undated) DEVICE — SET PRIMARY GRVTY 10DP MALE LL 104IN

## (undated) DEVICE — LEX ELECTRO PHYSIOLOGY: Brand: MEDLINE INDUSTRIES, INC.

## (undated) DEVICE — INTRO SHEATH FASTCATH SAFL .038IN 8.5F 60CM

## (undated) DEVICE — ST INF PRI SMRTSTE 20DRP 2VLV 24ML 117

## (undated) DEVICE — Device: Brand: WEBSTER CS

## (undated) DEVICE — LIMB HOLDER, WRIST/ANKLE: Brand: DEROYAL

## (undated) DEVICE — ADULT, W/LG. BACK PAD, RADIOTRANSPARENT ELEMENT AND LEAD WIRE: Brand: DEFIBRILLATION ELECTRODES

## (undated) DEVICE — CANN NASL CO2 DIVIDED A/

## (undated) DEVICE — DECANT BG O JET

## (undated) DEVICE — ELECTRD RETRN/GRND MEGADYNE SGL/PLT W/CORD 9FT DISP